# Patient Record
Sex: MALE | Race: WHITE | NOT HISPANIC OR LATINO | Employment: OTHER | URBAN - METROPOLITAN AREA
[De-identification: names, ages, dates, MRNs, and addresses within clinical notes are randomized per-mention and may not be internally consistent; named-entity substitution may affect disease eponyms.]

---

## 2017-01-04 ENCOUNTER — TRANSCRIBE ORDERS (OUTPATIENT)
Dept: LAB | Facility: CLINIC | Age: 67
End: 2017-01-04

## 2017-01-04 ENCOUNTER — APPOINTMENT (OUTPATIENT)
Dept: LAB | Facility: CLINIC | Age: 67
End: 2017-01-04
Payer: MEDICARE

## 2017-01-04 DIAGNOSIS — E10.10 TYPE 1 DIABETES MELLITUS WITH KETOACIDOSIS WITHOUT COMA (HCC): ICD-10-CM

## 2017-01-04 DIAGNOSIS — I10 ESSENTIAL HYPERTENSION, MALIGNANT: Primary | ICD-10-CM

## 2017-01-04 DIAGNOSIS — I15.9 OTHER SECONDARY HYPERTENSION, MALIGNANT: ICD-10-CM

## 2017-01-04 LAB
ALBUMIN SERPL BCP-MCNC: 3.6 G/DL (ref 3.5–5)
ANION GAP SERPL CALCULATED.3IONS-SCNC: 6 MMOL/L (ref 4–13)
BUN SERPL-MCNC: 15 MG/DL (ref 5–25)
CALCIUM ALBUM COR SERPL-MCNC: 10.7 MG/DL (ref 8.3–10.1)
CALCIUM SERPL-MCNC: 10.4 MD/DL (ref 8.3–10.1)
CALCIUM SERPL-MCNC: 10.4 MG/DL (ref 8.3–10.1)
CHLORIDE SERPL-SCNC: 103 MMOL/L (ref 100–108)
CHOLEST SERPL-MCNC: 149 MG/DL (ref 50–200)
CO2 SERPL-SCNC: 30 MMOL/L (ref 21–32)
CREAT SERPL-MCNC: 0.82 MG/DL (ref 0.6–1.3)
EST. AVERAGE GLUCOSE BLD GHB EST-MCNC: 140 MG/DL
GFR SERPL CREATININE-BSD FRML MDRD: >60 ML/MIN/1.73SQ M
GLUCOSE SERPL-MCNC: 137 MG/DL (ref 65–140)
HBA1C MFR BLD: 6.5 % (ref 4.2–6.3)
HDLC SERPL-MCNC: 41 MG/DL (ref 40–60)
LDLC SERPL CALC-MCNC: 53 MG/DL (ref 0–100)
POTASSIUM SERPL-SCNC: 4 MMOL/L (ref 3.5–5.3)
SODIUM SERPL-SCNC: 139 MMOL/L (ref 136–145)
TRIGL SERPL-MCNC: 274 MG/DL

## 2017-01-04 PROCEDURE — 80048 BASIC METABOLIC PNL TOTAL CA: CPT

## 2017-01-04 PROCEDURE — 80061 LIPID PANEL: CPT

## 2017-01-04 PROCEDURE — 82310 ASSAY OF CALCIUM: CPT

## 2017-01-04 PROCEDURE — 83036 HEMOGLOBIN GLYCOSYLATED A1C: CPT

## 2017-01-04 PROCEDURE — 82040 ASSAY OF SERUM ALBUMIN: CPT

## 2017-01-04 PROCEDURE — 36415 COLL VENOUS BLD VENIPUNCTURE: CPT

## 2017-04-11 ENCOUNTER — TRANSCRIBE ORDERS (OUTPATIENT)
Dept: LAB | Facility: CLINIC | Age: 67
End: 2017-04-11

## 2017-04-11 ENCOUNTER — APPOINTMENT (OUTPATIENT)
Dept: LAB | Facility: CLINIC | Age: 67
End: 2017-04-11
Payer: MEDICARE

## 2017-04-11 DIAGNOSIS — I10 ESSENTIAL HYPERTENSION, BENIGN: Primary | ICD-10-CM

## 2017-04-11 DIAGNOSIS — I10 ESSENTIAL HYPERTENSION, BENIGN: ICD-10-CM

## 2017-04-11 DIAGNOSIS — E11.8 TYPE 2 DIABETES MELLITUS WITH COMPLICATION, WITHOUT LONG-TERM CURRENT USE OF INSULIN (HCC): ICD-10-CM

## 2017-04-11 LAB
ALBUMIN SERPL BCP-MCNC: 3.8 G/DL (ref 3.5–5)
ANION GAP SERPL CALCULATED.3IONS-SCNC: 10 MMOL/L (ref 4–13)
BUN SERPL-MCNC: 12 MG/DL (ref 5–25)
CALCIUM ALBUM COR SERPL-MCNC: 10.4 MG/DL (ref 8.3–10.1)
CALCIUM SERPL-MCNC: 10.2 MD/DL (ref 8.3–10.1)
CALCIUM SERPL-MCNC: 10.2 MG/DL (ref 8.3–10.1)
CHLORIDE SERPL-SCNC: 103 MMOL/L (ref 100–108)
CHOLEST SERPL-MCNC: 159 MG/DL (ref 50–200)
CO2 SERPL-SCNC: 27 MMOL/L (ref 21–32)
CREAT SERPL-MCNC: 0.86 MG/DL (ref 0.6–1.3)
EST. AVERAGE GLUCOSE BLD GHB EST-MCNC: 151 MG/DL
GFR SERPL CREATININE-BSD FRML MDRD: >60 ML/MIN/1.73SQ M
GLUCOSE P FAST SERPL-MCNC: 148 MG/DL (ref 65–99)
HBA1C MFR BLD: 6.9 % (ref 4.2–6.3)
HDLC SERPL-MCNC: 44 MG/DL (ref 40–60)
LDLC SERPL CALC-MCNC: 60 MG/DL (ref 0–100)
POTASSIUM SERPL-SCNC: 3.9 MMOL/L (ref 3.5–5.3)
SODIUM SERPL-SCNC: 140 MMOL/L (ref 136–145)
TRIGL SERPL-MCNC: 277 MG/DL

## 2017-04-11 PROCEDURE — 82040 ASSAY OF SERUM ALBUMIN: CPT

## 2017-04-11 PROCEDURE — 80048 BASIC METABOLIC PNL TOTAL CA: CPT

## 2017-04-11 PROCEDURE — 82310 ASSAY OF CALCIUM: CPT

## 2017-04-11 PROCEDURE — 80061 LIPID PANEL: CPT

## 2017-04-11 PROCEDURE — 83036 HEMOGLOBIN GLYCOSYLATED A1C: CPT

## 2017-04-11 PROCEDURE — 36415 COLL VENOUS BLD VENIPUNCTURE: CPT

## 2017-07-31 ENCOUNTER — APPOINTMENT (OUTPATIENT)
Dept: LAB | Facility: CLINIC | Age: 67
End: 2017-07-31
Payer: MEDICARE

## 2017-07-31 ENCOUNTER — TRANSCRIBE ORDERS (OUTPATIENT)
Dept: LAB | Facility: CLINIC | Age: 67
End: 2017-07-31

## 2017-07-31 DIAGNOSIS — E11.9 DIABETES MELLITUS WITHOUT COMPLICATION (HCC): ICD-10-CM

## 2017-07-31 DIAGNOSIS — E78.5 HYPERLIPIDEMIA, UNSPECIFIED HYPERLIPIDEMIA TYPE: Primary | ICD-10-CM

## 2017-07-31 DIAGNOSIS — E78.5 HYPERLIPIDEMIA, UNSPECIFIED HYPERLIPIDEMIA TYPE: ICD-10-CM

## 2017-07-31 LAB
ALBUMIN SERPL BCP-MCNC: 3.9 G/DL (ref 3.5–5)
ANION GAP SERPL CALCULATED.3IONS-SCNC: 9 MMOL/L (ref 4–13)
BUN SERPL-MCNC: 13 MG/DL (ref 5–25)
CALCIUM ALBUM COR SERPL-MCNC: 10.8 MG/DL (ref 8.3–10.1)
CALCIUM SERPL-MCNC: 10.7 MD/DL (ref 8.3–10.1)
CALCIUM SERPL-MCNC: 10.7 MG/DL (ref 8.3–10.1)
CHLORIDE SERPL-SCNC: 103 MMOL/L (ref 100–108)
CO2 SERPL-SCNC: 27 MMOL/L (ref 21–32)
CREAT SERPL-MCNC: 0.8 MG/DL (ref 0.6–1.3)
EST. AVERAGE GLUCOSE BLD GHB EST-MCNC: 143 MG/DL
GFR SERPL CREATININE-BSD FRML MDRD: 92 ML/MIN/1.73SQ M
GLUCOSE P FAST SERPL-MCNC: 130 MG/DL (ref 65–99)
HBA1C MFR BLD: 6.6 % (ref 4.2–6.3)
POTASSIUM SERPL-SCNC: 3.9 MMOL/L (ref 3.5–5.3)
SODIUM SERPL-SCNC: 139 MMOL/L (ref 136–145)

## 2017-07-31 PROCEDURE — 36415 COLL VENOUS BLD VENIPUNCTURE: CPT

## 2017-07-31 PROCEDURE — 80048 BASIC METABOLIC PNL TOTAL CA: CPT

## 2017-07-31 PROCEDURE — 82040 ASSAY OF SERUM ALBUMIN: CPT

## 2017-07-31 PROCEDURE — 82310 ASSAY OF CALCIUM: CPT

## 2017-07-31 PROCEDURE — 83036 HEMOGLOBIN GLYCOSYLATED A1C: CPT

## 2017-11-28 ENCOUNTER — TRANSCRIBE ORDERS (OUTPATIENT)
Dept: LAB | Facility: CLINIC | Age: 67
End: 2017-11-28

## 2017-11-28 ENCOUNTER — APPOINTMENT (OUTPATIENT)
Dept: LAB | Facility: CLINIC | Age: 67
End: 2017-11-28
Payer: MEDICARE

## 2017-11-28 DIAGNOSIS — E11.9 DIABETES MELLITUS WITHOUT COMPLICATION (HCC): ICD-10-CM

## 2017-11-28 DIAGNOSIS — I10 HYPERTENSION, UNSPECIFIED TYPE: ICD-10-CM

## 2017-11-28 DIAGNOSIS — E78.00 HYPERCHOLESTEREMIA: ICD-10-CM

## 2017-11-28 DIAGNOSIS — E11.9 DIABETES MELLITUS WITHOUT COMPLICATION (HCC): Primary | ICD-10-CM

## 2017-11-28 LAB
ALBUMIN SERPL BCP-MCNC: 3.9 G/DL (ref 3.5–5)
ANION GAP SERPL CALCULATED.3IONS-SCNC: 6 MMOL/L (ref 4–13)
BUN SERPL-MCNC: 14 MG/DL (ref 5–25)
CALCIUM ALBUM COR SERPL-MCNC: 10.6 MG/DL (ref 8.3–10.1)
CALCIUM SERPL-MCNC: 10.5 MG/DL (ref 8.3–10.1)
CALCIUM SERPL-MCNC: 10.5 MG/DL (ref 8.3–10.1)
CHLORIDE SERPL-SCNC: 105 MMOL/L (ref 100–108)
CHOLEST SERPL-MCNC: 146 MG/DL (ref 50–200)
CO2 SERPL-SCNC: 29 MMOL/L (ref 21–32)
CREAT SERPL-MCNC: 0.85 MG/DL (ref 0.6–1.3)
EST. AVERAGE GLUCOSE BLD GHB EST-MCNC: 140 MG/DL
GFR SERPL CREATININE-BSD FRML MDRD: 90 ML/MIN/1.73SQ M
GLUCOSE P FAST SERPL-MCNC: 140 MG/DL (ref 65–99)
HBA1C MFR BLD: 6.5 % (ref 4.2–6.3)
HDLC SERPL-MCNC: 41 MG/DL (ref 40–60)
LDLC SERPL CALC-MCNC: 58 MG/DL (ref 0–100)
POTASSIUM SERPL-SCNC: 3.9 MMOL/L (ref 3.5–5.3)
SODIUM SERPL-SCNC: 140 MMOL/L (ref 136–145)
TRIGL SERPL-MCNC: 234 MG/DL

## 2017-11-28 PROCEDURE — 80048 BASIC METABOLIC PNL TOTAL CA: CPT

## 2017-11-28 PROCEDURE — 80061 LIPID PANEL: CPT

## 2017-11-28 PROCEDURE — 82040 ASSAY OF SERUM ALBUMIN: CPT

## 2017-11-28 PROCEDURE — 83036 HEMOGLOBIN GLYCOSYLATED A1C: CPT

## 2017-11-28 PROCEDURE — 82310 ASSAY OF CALCIUM: CPT

## 2017-11-28 PROCEDURE — 36415 COLL VENOUS BLD VENIPUNCTURE: CPT

## 2018-02-28 ENCOUNTER — TRANSCRIBE ORDERS (OUTPATIENT)
Dept: LAB | Facility: CLINIC | Age: 68
End: 2018-02-28

## 2018-02-28 ENCOUNTER — APPOINTMENT (OUTPATIENT)
Dept: LAB | Facility: CLINIC | Age: 68
End: 2018-02-28
Payer: MEDICARE

## 2018-02-28 DIAGNOSIS — E11.9 DIABETES MELLITUS WITHOUT COMPLICATION (HCC): ICD-10-CM

## 2018-02-28 DIAGNOSIS — E11.9 DIABETES MELLITUS WITHOUT COMPLICATION (HCC): Primary | ICD-10-CM

## 2018-02-28 DIAGNOSIS — E78.00 HYPERCHOLESTEROLEMIA: ICD-10-CM

## 2018-02-28 DIAGNOSIS — I10 HYPERTENSION, UNSPECIFIED TYPE: ICD-10-CM

## 2018-02-28 LAB
ALBUMIN SERPL BCP-MCNC: 3.9 G/DL (ref 3.5–5)
ANION GAP SERPL CALCULATED.3IONS-SCNC: 8 MMOL/L (ref 4–13)
BUN SERPL-MCNC: 13 MG/DL (ref 5–25)
CALCIUM ALBUM COR SERPL-MCNC: 11 MG/DL (ref 8.3–10.1)
CALCIUM SERPL-MCNC: 10.9 MG/DL (ref 8.3–10.1)
CALCIUM SERPL-MCNC: 10.9 MG/DL (ref 8.3–10.1)
CHLORIDE SERPL-SCNC: 104 MMOL/L (ref 100–108)
CHOLEST SERPL-MCNC: 153 MG/DL (ref 50–200)
CO2 SERPL-SCNC: 29 MMOL/L (ref 21–32)
CREAT SERPL-MCNC: 0.8 MG/DL (ref 0.6–1.3)
EST. AVERAGE GLUCOSE BLD GHB EST-MCNC: 148 MG/DL
GFR SERPL CREATININE-BSD FRML MDRD: 92 ML/MIN/1.73SQ M
GLUCOSE P FAST SERPL-MCNC: 129 MG/DL (ref 65–99)
HBA1C MFR BLD: 6.8 % (ref 4.2–6.3)
HDLC SERPL-MCNC: 46 MG/DL (ref 40–60)
LDLC SERPL CALC-MCNC: 61 MG/DL (ref 0–100)
POTASSIUM SERPL-SCNC: 3.9 MMOL/L (ref 3.5–5.3)
SODIUM SERPL-SCNC: 141 MMOL/L (ref 136–145)
TRIGL SERPL-MCNC: 228 MG/DL

## 2018-02-28 PROCEDURE — 82310 ASSAY OF CALCIUM: CPT

## 2018-02-28 PROCEDURE — 80061 LIPID PANEL: CPT

## 2018-02-28 PROCEDURE — 80048 BASIC METABOLIC PNL TOTAL CA: CPT

## 2018-02-28 PROCEDURE — 83036 HEMOGLOBIN GLYCOSYLATED A1C: CPT

## 2018-02-28 PROCEDURE — 82040 ASSAY OF SERUM ALBUMIN: CPT

## 2018-02-28 PROCEDURE — 36415 COLL VENOUS BLD VENIPUNCTURE: CPT

## 2018-05-29 ENCOUNTER — TRANSCRIBE ORDERS (OUTPATIENT)
Dept: LAB | Facility: CLINIC | Age: 68
End: 2018-05-29

## 2018-05-29 ENCOUNTER — APPOINTMENT (OUTPATIENT)
Dept: LAB | Facility: CLINIC | Age: 68
End: 2018-05-29
Payer: MEDICARE

## 2018-05-29 DIAGNOSIS — E11.9 DIABETES MELLITUS WITHOUT COMPLICATION (HCC): Primary | ICD-10-CM

## 2018-05-29 DIAGNOSIS — E11.9 DIABETES MELLITUS WITHOUT COMPLICATION (HCC): ICD-10-CM

## 2018-05-29 LAB
ALBUMIN SERPL BCP-MCNC: 4.1 G/DL (ref 3.5–5)
ANION GAP SERPL CALCULATED.3IONS-SCNC: 6 MMOL/L (ref 4–13)
BUN SERPL-MCNC: 14 MG/DL (ref 5–25)
CALCIUM ALBUM COR SERPL-MCNC: 10.5 MG/DL (ref 8.3–10.1)
CALCIUM SERPL-MCNC: 10.6 MG/DL (ref 8.3–10.1)
CALCIUM SERPL-MCNC: 10.6 MG/DL (ref 8.3–10.1)
CHLORIDE SERPL-SCNC: 103 MMOL/L (ref 100–108)
CO2 SERPL-SCNC: 29 MMOL/L (ref 21–32)
CREAT SERPL-MCNC: 0.83 MG/DL (ref 0.6–1.3)
EST. AVERAGE GLUCOSE BLD GHB EST-MCNC: 137 MG/DL
GFR SERPL CREATININE-BSD FRML MDRD: 90 ML/MIN/1.73SQ M
GLUCOSE P FAST SERPL-MCNC: 133 MG/DL (ref 65–99)
HBA1C MFR BLD: 6.4 % (ref 4.2–6.3)
POTASSIUM SERPL-SCNC: 4.1 MMOL/L (ref 3.5–5.3)
SODIUM SERPL-SCNC: 138 MMOL/L (ref 136–145)

## 2018-05-29 PROCEDURE — 36415 COLL VENOUS BLD VENIPUNCTURE: CPT

## 2018-05-29 PROCEDURE — 80048 BASIC METABOLIC PNL TOTAL CA: CPT

## 2018-05-29 PROCEDURE — 83036 HEMOGLOBIN GLYCOSYLATED A1C: CPT

## 2018-05-29 PROCEDURE — 82040 ASSAY OF SERUM ALBUMIN: CPT

## 2018-07-23 DIAGNOSIS — I10 ESSENTIAL HYPERTENSION: Primary | ICD-10-CM

## 2018-07-23 RX ORDER — ATENOLOL 50 MG/1
50 TABLET ORAL DAILY
Qty: 90 TABLET | Refills: 3 | Status: SHIPPED | OUTPATIENT
Start: 2018-07-23 | End: 2019-07-18 | Stop reason: SDUPTHER

## 2018-07-23 RX ORDER — ATENOLOL 50 MG/1
TABLET ORAL
Refills: 0 | COMMUNITY
Start: 2018-04-23 | End: 2018-07-23 | Stop reason: SDUPTHER

## 2018-07-26 DIAGNOSIS — Z79.4 TYPE 2 DIABETES MELLITUS WITHOUT COMPLICATION, WITH LONG-TERM CURRENT USE OF INSULIN (HCC): Primary | ICD-10-CM

## 2018-07-26 DIAGNOSIS — E11.9 TYPE 2 DIABETES MELLITUS WITHOUT COMPLICATION, WITH LONG-TERM CURRENT USE OF INSULIN (HCC): Primary | ICD-10-CM

## 2018-07-31 DIAGNOSIS — E78.5 HYPERLIPIDEMIA, UNSPECIFIED HYPERLIPIDEMIA TYPE: ICD-10-CM

## 2018-07-31 DIAGNOSIS — I10 HYPERTENSION, UNSPECIFIED TYPE: Primary | ICD-10-CM

## 2018-07-31 RX ORDER — HYDROCHLOROTHIAZIDE 25 MG/1
25 TABLET ORAL DAILY
Qty: 30 TABLET | Refills: 3 | Status: SHIPPED | OUTPATIENT
Start: 2018-07-31 | End: 2018-11-29 | Stop reason: SDUPTHER

## 2018-07-31 RX ORDER — SIMVASTATIN 20 MG
20 TABLET ORAL
Qty: 30 TABLET | Refills: 3 | Status: SHIPPED | OUTPATIENT
Start: 2018-07-31 | End: 2018-11-29 | Stop reason: SDUPTHER

## 2018-08-07 DIAGNOSIS — I10 HYPERTENSION, UNSPECIFIED TYPE: Primary | ICD-10-CM

## 2018-08-07 RX ORDER — BENAZEPRIL HYDROCHLORIDE 20 MG/1
20 TABLET ORAL DAILY
Qty: 30 TABLET | Refills: 3 | Status: SHIPPED | OUTPATIENT
Start: 2018-08-07 | End: 2018-12-07 | Stop reason: SDUPTHER

## 2018-08-07 RX ORDER — AMLODIPINE BESYLATE 10 MG/1
10 TABLET ORAL DAILY
Qty: 30 TABLET | Refills: 3 | Status: SHIPPED | OUTPATIENT
Start: 2018-08-07 | End: 2018-12-07 | Stop reason: SDUPTHER

## 2018-08-09 PROBLEM — E78.5 HYPERLIPIDEMIA: Status: ACTIVE | Noted: 2018-08-09

## 2018-08-09 PROBLEM — I10 HYPERTENSION: Status: ACTIVE | Noted: 2018-08-09

## 2018-08-09 PROBLEM — D49.2 NEOPLASM OF SKIN: Status: ACTIVE | Noted: 2018-08-09

## 2018-08-09 PROBLEM — I25.10 CARDIOVASCULAR DISEASE: Status: ACTIVE | Noted: 2018-08-09

## 2018-08-09 PROBLEM — E11.9 DIABETES MELLITUS (HCC): Status: ACTIVE | Noted: 2018-08-09

## 2018-08-09 PROBLEM — E66.9 OBESITY: Status: ACTIVE | Noted: 2018-08-09

## 2018-08-29 DIAGNOSIS — E11.8 TYPE 2 DIABETES MELLITUS WITH COMPLICATION, WITHOUT LONG-TERM CURRENT USE OF INSULIN (HCC): Primary | ICD-10-CM

## 2018-09-05 ENCOUNTER — OFFICE VISIT (OUTPATIENT)
Dept: FAMILY MEDICINE CLINIC | Facility: CLINIC | Age: 68
End: 2018-09-05
Payer: MEDICARE

## 2018-09-05 VITALS
SYSTOLIC BLOOD PRESSURE: 142 MMHG | TEMPERATURE: 98.1 F | HEART RATE: 88 BPM | HEIGHT: 67 IN | DIASTOLIC BLOOD PRESSURE: 82 MMHG | WEIGHT: 189.8 LBS | OXYGEN SATURATION: 98 % | RESPIRATION RATE: 18 BRPM | BODY MASS INDEX: 29.79 KG/M2

## 2018-09-05 DIAGNOSIS — J06.9 UPPER RESPIRATORY TRACT INFECTION, UNSPECIFIED TYPE: ICD-10-CM

## 2018-09-05 DIAGNOSIS — E11.8 TYPE 2 DIABETES MELLITUS WITH COMPLICATION, WITHOUT LONG-TERM CURRENT USE OF INSULIN (HCC): Primary | ICD-10-CM

## 2018-09-05 DIAGNOSIS — I10 ESSENTIAL HYPERTENSION: ICD-10-CM

## 2018-09-05 DIAGNOSIS — E78.5 HYPERLIPIDEMIA, UNSPECIFIED HYPERLIPIDEMIA TYPE: ICD-10-CM

## 2018-09-05 PROCEDURE — 99214 OFFICE O/P EST MOD 30 MIN: CPT | Performed by: INTERNAL MEDICINE

## 2018-09-05 RX ORDER — BRIMONIDINE TARTRATE/TIMOLOL 0.2%-0.5%
1 DROPS OPHTHALMIC (EYE) EVERY 12 HOURS SCHEDULED
COMMUNITY
Start: 2018-08-27 | End: 2021-01-01 | Stop reason: HOSPADM

## 2018-09-05 RX ORDER — AZITHROMYCIN 250 MG/1
250 TABLET, FILM COATED ORAL EVERY 24 HOURS
Qty: 6 TABLET | Refills: 0 | Status: SHIPPED | OUTPATIENT
Start: 2018-09-05 | End: 2018-09-10

## 2018-09-05 RX ORDER — NETARSUDIL 0.2 MG/ML
SOLUTION/ DROPS OPHTHALMIC; TOPICAL
COMMUNITY
Start: 2018-07-23 | End: 2021-01-01 | Stop reason: HOSPADM

## 2018-09-05 NOTE — PROGRESS NOTES
Assessment/Plan:  Acute bronchitis  Z-Izaiah 250 mg 2 tablets initially then 1 tablet daily for 4 days  Robitussin DM 10 cc 3 times a day if no improvement in 1 week then CBC chest x-ray will be done  Diabetes mellitus type 2  Well controlled with metformin 1 g twice a day and Januvia 50 mg daily  Hypertension well controlled with Norvasc 10 mg daily Lotensin 20 mg daily hydrochlorothiazide 25 mg daily  Hyperlipidemia continue Zocor 20 mg daily  History of glaucoma is on multiple medication  The lab data done on from May 29, 2018 shows A1c of 6 4 electrolytes are normal fasting sugar is 133 calcium was borderline elevated this can be addressed by doctor Glenn Saucedo his regular physician these lab data were reviewed and discussed with the patient recheck up in about 7-10 days with doctor Glenn Saucedo    Diagnoses and all orders for this visit:    Type 2 diabetes mellitus with complication, without long-term current use of insulin ([address] )    Essential hypertension    Hyperlipidemia, unspecified hyperlipidemia type    Upper respiratory tract infection, unspecified type  -     azithromycin (ZITHROMAX) 250 mg tablet; Take 1 tablet (250 mg total) by mouth every 24 hours for 5 days Two tablets initially then 1 tablet daily for 4 days    Other orders  -     COMBIGAN 0 2-0 5 %;   -     RHOPRESSA 0 02 % SOLN;   -     travoprost (TRAVATAN Z) 0 004 % ophthalmic solution; Administer 1 drop to both eyes daily at bedtime        Subjective:      Patient ID: Moustapha Arana is a 76 y o  male      43-year-old white male is coming in for a routine follow-up visit  Reports cough of greenish sputum for the past 3 days no fever no chest pain no shortness of breathing no rectal bleeding or melena is reported  No abdominal pain is reported        The following portions of the patient's history were reviewed and updated as appropriate: allergies, current medications, past family history, past medical history, past social history, past surgical history and problem list       Current Outpatient Prescriptions:     amLODIPine (NORVASC) 10 mg tablet, Take 1 tablet (10 mg total) by mouth daily, Disp: 30 tablet, Rfl: 3    aspirin 81 MG tablet, Take 81 mg by mouth daily, Disp: , Rfl:     atenolol (TENORMIN) 50 mg tablet, Take 1 tablet (50 mg total) by mouth daily, Disp: 90 tablet, Rfl: 3    benazepril (LOTENSIN) 20 mg tablet, Take 1 tablet (20 mg total) by mouth daily, Disp: 30 tablet, Rfl: 3    COMBIGAN 0 2-0 5 %, , Disp: , Rfl:     hydrochlorothiazide (HYDRODIURIL) 25 mg tablet, Take 1 tablet (25 mg total) by mouth daily, Disp: 30 tablet, Rfl: 3    metFORMIN (GLUCOPHAGE) 1000 MG tablet, Take 1 tablet (1,000 mg total) by mouth 2 (two) times a day with meals, Disp: 60 tablet, Rfl: 3    RHOPRESSA 0 02 % SOLN, , Disp: , Rfl:     simvastatin (ZOCOR) 20 mg tablet, Take 1 tablet (20 mg total) by mouth daily at bedtime, Disp: 30 tablet, Rfl: 3    sitaGLIPtin (JANUVIA) 50 mg tablet, Take 1 tablet (50 mg total) by mouth daily, Disp: 90 tablet, Rfl: 1    travoprost (TRAVATAN Z) 0 004 % ophthalmic solution, Administer 1 drop to both eyes daily at bedtime, Disp: , Rfl:     azithromycin (ZITHROMAX) 250 mg tablet, Take 1 tablet (250 mg total) by mouth every 24 hours for 5 days Two tablets initially then 1 tablet daily for 4 days, Disp: 6 tablet, Rfl: 0    Past Medical History:   Diagnosis Date    Cerebral vascular disease     Diabetes mellitus ([facility] )     Hyperlipidemia     Hypertension        Past Surgical History:   Procedure Laterality Date    CATARACT EXTRACTION      GANGLION CYST EXCISION      SKIN LESION EXCISION         Social History       Patient Active Problem List   Diagnosis    Hypertension    Hyperlipidemia    Diabetes mellitus ([facility] )    Cardiovascular disease    Neoplasm of skin    Obesity           Review of Systems   Constitutional: Negative  HENT: Negative  Eyes: Negative  Respiratory: Negative  Cardiovascular: Negative  Gastrointestinal: Negative  Endocrine: Negative  Genitourinary: Negative  Musculoskeletal: Negative  Skin: Negative  Allergic/Immunologic: Negative  Neurological: Negative  Hematological: Negative  Psychiatric/Behavioral: Negative  Objective:      /82   Pulse 88   Temp 98 1 °F (36 7 °C) (Tympanic)   Resp 18   Ht 5' 7" (1 702 m)   Wt 86 1 kg (189 lb 12 8 oz)   SpO2 98%   BMI 29 73 kg/m²          Physical Exam   Constitutional: He is oriented to person, place, and time  He appears well-developed and well-nourished  HENT:   Head: Normocephalic  Mouth/Throat: Oropharynx is clear and moist    Eyes: Conjunctivae are normal  Pupils are equal, round, and reactive to light  Neck: Normal range of motion  Neck supple  Cardiovascular: Normal rate and normal heart sounds  Pulmonary/Chest: Effort normal and breath sounds normal    Abdominal: Soft  Bowel sounds are normal    Musculoskeletal: Normal range of motion  Neurological: He is alert and oriented to person, place, and time  Skin: Skin is warm and dry  Appointment on 05/29/2018   Component Date Value Ref Range Status    Sodium 05/29/2018 138  136 - 145 mmol/L Final    Potassium 05/29/2018 4 1  3 5 - 5 3 mmol/L Final    Chloride 05/29/2018 103  100 - 108 mmol/L Final    CO2 05/29/2018 29  21 - 32 mmol/L Final    ANION GAP 05/29/2018 6  4 - 13 mmol/L Final    BUN 05/29/2018 14  5 - 25 mg/dL Final    Creatinine 05/29/2018 0 83  0 60 - 1 30 mg/dL Final    Standardized to IDMS reference method    Glucose, Fasting 05/29/2018 133* 65 - 99 mg/dL Final      Specimen collection should occur prior to Sulfasalazine administration due to the potential for falsely depressed results  Specimen collection should occur prior to Sulfapyridine administration due to the potential for falsely elevated results      Calcium 05/29/2018 10 6* 8 3 - 10 1 mg/dL Final    eGFR 05/29/2018 90  ml/min/1 73sq m Final    Hemoglobin A1C 05/29/2018 6 4* 4 2 - 6 3 % Final    EAG 05/29/2018 137  mg/dl Final    Albumin 05/29/2018 4 1  3 5 - 5 0 g/dL Final    Corrected Calcium 05/29/2018 10 5* 8 3 - 10 1 mg/dL Final    CALCIUM FOR CA/ALB 05/29/2018 10 6* 8 3 - 10 1 mg/dL Final       No results found

## 2018-10-09 ENCOUNTER — OFFICE VISIT (OUTPATIENT)
Dept: FAMILY MEDICINE CLINIC | Facility: CLINIC | Age: 68
End: 2018-10-09
Payer: MEDICARE

## 2018-10-09 VITALS
DIASTOLIC BLOOD PRESSURE: 76 MMHG | RESPIRATION RATE: 16 BRPM | HEIGHT: 67 IN | SYSTOLIC BLOOD PRESSURE: 130 MMHG | HEART RATE: 82 BPM | OXYGEN SATURATION: 99 % | WEIGHT: 191 LBS | BODY MASS INDEX: 29.98 KG/M2 | TEMPERATURE: 97.4 F

## 2018-10-09 DIAGNOSIS — Z23 NEEDS FLU SHOT: Primary | ICD-10-CM

## 2018-10-09 DIAGNOSIS — E11.8 TYPE 2 DIABETES MELLITUS WITH COMPLICATION, WITHOUT LONG-TERM CURRENT USE OF INSULIN (HCC): ICD-10-CM

## 2018-10-09 DIAGNOSIS — E78.5 HYPERLIPIDEMIA, UNSPECIFIED HYPERLIPIDEMIA TYPE: ICD-10-CM

## 2018-10-09 DIAGNOSIS — Z00.00 PREVENTATIVE HEALTH CARE: ICD-10-CM

## 2018-10-09 DIAGNOSIS — J20.9 ACUTE BRONCHITIS, UNSPECIFIED ORGANISM: ICD-10-CM

## 2018-10-09 DIAGNOSIS — I10 ESSENTIAL HYPERTENSION: ICD-10-CM

## 2018-10-09 PROCEDURE — 90732 PPSV23 VACC 2 YRS+ SUBQ/IM: CPT

## 2018-10-09 PROCEDURE — 90662 IIV NO PRSV INCREASED AG IM: CPT

## 2018-10-09 PROCEDURE — G0008 ADMIN INFLUENZA VIRUS VAC: HCPCS

## 2018-10-09 PROCEDURE — G0009 ADMIN PNEUMOCOCCAL VACCINE: HCPCS

## 2018-10-09 PROCEDURE — 99214 OFFICE O/P EST MOD 30 MIN: CPT | Performed by: INTERNAL MEDICINE

## 2018-10-09 NOTE — PROGRESS NOTES
Assessment/Plan:  1  Acute bronchitis resolved  2   Type 2 diabetes with satisfactory control  A1c 6 4  Fasting sugar 120-130   3  Hypertension treated  4   Hyperlipidemia on statin therapy  Plan  Meds reviewed no changes made  Check Chem 7 lipid profile and A1c for next visit  Patient given flu shot and booster for Pneumovax 23  Patient has previously received Prevnar 13 last seen December  Recheck and follow-up 3 months         Diagnoses and all orders for this visit:    Needs flu shot  -     influenza vaccine, [id number], high-dose, PF 0 5 mL, for patients 65 yr+ (FLUZONE HIGH-DOSE)    Type 2 diabetes mellitus with complication, without long-term current use of insulin (HCC)  -     Hemoglobin A1C; Future    Essential hypertension  -     Basic metabolic panel; Future    Hyperlipidemia, unspecified hyperlipidemia type  -     Lipid panel; Future    Acute bronchitis, unspecified organism  -     CBC and differential; Future    Preventative health care  -     PNEUMOCOCCAL POLYSACCHARIDE VACCINE 23-VALENT =>1YO SQ IM          Subjective:      Patient ID: Lizbeth Mujica is a 76 y o  male  22-year-old male comes for a follow-up visit  Patient was recently seen and treated for an episode of acute bronchitis which has resolved now  Patient has medical history of hypertension treated on current medication, type 2 diabetes last A1c 6 4  History of hyperlipidemia on statin therapy  Patient denies any chest pain dyspnea or palpitation  No nausea no vomiting  The following portions of the patient's history were reviewed and updated as appropriate: He  has a past medical history of Cerebral vascular disease; Diabetes mellitus ([address] ); Hyperlipidemia; and Hypertension    Patient Active Problem List    Diagnosis Date Noted    Hypertension 08/09/2018    Hyperlipidemia 08/09/2018    Diabetes mellitus ([address] ) 08/09/2018    Cardiovascular disease 08/09/2018    Neoplasm of skin 08/09/2018    Obesity 08/09/2018     He  has a past surgical history that includes Skin lesion excision; Cataract extraction; and Ganglion cyst excision  His family history includes Asthma in his mother; Diabetes in his father; Emphysema in his mother; Heart disease in his mother; Hypertension in his mother  He  reports that he has quit smoking  His smoking use included Cigars  He has never used smokeless tobacco  He reports that he drinks about 0 6 oz of alcohol per week   He reports that he does not use drugs  Current Outpatient Prescriptions   Medication Sig Dispense Refill    amLODIPine (NORVASC) 10 mg tablet Take 1 tablet (10 mg total) by mouth daily 30 tablet 3    aspirin 81 MG tablet Take 81 mg by mouth daily      atenolol (TENORMIN) 50 mg tablet Take 1 tablet (50 mg total) by mouth daily 90 tablet 3    benazepril (LOTENSIN) 20 mg tablet Take 1 tablet (20 mg total) by mouth daily 30 tablet 3    COMBIGAN 0 2-0 5 %       hydrochlorothiazide (HYDRODIURIL) 25 mg tablet Take 1 tablet (25 mg total) by mouth daily 30 tablet 3    metFORMIN (GLUCOPHAGE) 1000 MG tablet Take 1 tablet (1,000 mg total) by mouth 2 (two) times a day with meals 60 tablet 3    RHOPRESSA 0 02 % SOLN       simvastatin (ZOCOR) 20 mg tablet Take 1 tablet (20 mg total) by mouth daily at bedtime 30 tablet 3    sitaGLIPtin (JANUVIA) 50 mg tablet Take 1 tablet (50 mg total) by mouth daily 90 tablet 1    travoprost (TRAVATAN Z) 0 004 % ophthalmic solution Administer 1 drop to both eyes daily at bedtime       No current facility-administered medications for this visit        Current Outpatient Prescriptions on File Prior to Visit   Medication Sig    amLODIPine (NORVASC) 10 mg tablet Take 1 tablet (10 mg total) by mouth daily    aspirin 81 MG tablet Take 81 mg by mouth daily    atenolol (TENORMIN) 50 mg tablet Take 1 tablet (50 mg total) by mouth daily    benazepril (LOTENSIN) 20 mg tablet Take 1 tablet (20 mg total) by mouth daily    COMBIGAN 0 2-0 5 %  hydrochlorothiazide (HYDRODIURIL) 25 mg tablet Take 1 tablet (25 mg total) by mouth daily    metFORMIN (GLUCOPHAGE) 1000 MG tablet Take 1 tablet (1,000 mg total) by mouth 2 (two) times a day with meals    RHOPRESSA 0 02 % SOLN     simvastatin (ZOCOR) 20 mg tablet Take 1 tablet (20 mg total) by mouth daily at bedtime    sitaGLIPtin (JANUVIA) 50 mg tablet Take 1 tablet (50 mg total) by mouth daily    travoprost (TRAVATAN Z) 0 004 % ophthalmic solution Administer 1 drop to both eyes daily at bedtime     No current facility-administered medications on file prior to visit  He is allergic to allopurinol and sulfa antibiotics       Review of Systems   Constitutional: Negative  HENT: Negative  Eyes: Negative  Respiratory: Negative  Cardiovascular: Negative  Gastrointestinal: Negative  Endocrine: Negative  Genitourinary: Negative  Musculoskeletal: Negative  Skin: Negative  Allergic/Immunologic: Negative  Neurological: Negative  Hematological: Negative  Psychiatric/Behavioral: Negative  Objective:      /76 (BP Location: Right arm, Patient Position: Supine, Cuff Size: Adult)   Pulse 82   Temp (!) 97 4 °F (36 3 °C) (Tympanic)   Resp 16   Ht 5' 7" (1 702 m)   Wt 86 6 kg (191 lb)   SpO2 99%   BMI 29 91 kg/m²          Physical Exam   Constitutional: He is oriented to person, place, and time  He appears well-developed and well-nourished  HENT:   Head: Normocephalic  Eyes: Pupils are equal, round, and reactive to light  Neck: Normal range of motion  Neck supple  No JVD present  No tracheal deviation present  No thyromegaly present  Cardiovascular: Normal rate, regular rhythm, normal heart sounds and intact distal pulses  Exam reveals no gallop and no friction rub  No murmur heard  Pulmonary/Chest: Effort normal and breath sounds normal    Abdominal: Soft  Bowel sounds are normal    Musculoskeletal: Normal range of motion     Neurological: He is alert and oriented to person, place, and time  Skin: Skin is warm  No visits with results within 4 Month(s) from this visit  Latest known visit with results is:   Appointment on 05/29/2018   Component Date Value Ref Range Status    Sodium 05/29/2018 138  136 - 145 mmol/L Final    Potassium 05/29/2018 4 1  3 5 - 5 3 mmol/L Final    Chloride 05/29/2018 103  100 - 108 mmol/L Final    CO2 05/29/2018 29  21 - 32 mmol/L Final    ANION GAP 05/29/2018 6  4 - 13 mmol/L Final    BUN 05/29/2018 14  5 - 25 mg/dL Final    Creatinine 05/29/2018 0 83  0 60 - 1 30 mg/dL Final    Standardized to IDMS reference method    Glucose, Fasting 05/29/2018 133* 65 - 99 mg/dL Final      Specimen collection should occur prior to Sulfasalazine administration due to the potential for falsely depressed results  Specimen collection should occur prior to Sulfapyridine administration due to the potential for falsely elevated results   Calcium 05/29/2018 10 6* 8 3 - 10 1 mg/dL Final    eGFR 05/29/2018 90  ml/min/1 73sq m Final    Hemoglobin A1C 05/29/2018 6 4* 4 2 - 6 3 % Final    EAG 05/29/2018 137  mg/dl Final    Albumin 05/29/2018 4 1  3 5 - 5 0 g/dL Final    Corrected Calcium 05/29/2018 10 5* 8 3 - 10 1 mg/dL Final    CALCIUM FOR CA/ALB 05/29/2018 10 6* 8 3 - 10 1 mg/dL Final       No results found

## 2018-10-17 DIAGNOSIS — E10.9 TYPE 1 DIABETES MELLITUS WITHOUT COMPLICATION (HCC): Primary | ICD-10-CM

## 2018-10-18 DIAGNOSIS — E10.9 TYPE 1 DIABETES MELLITUS WITHOUT COMPLICATION (HCC): Primary | ICD-10-CM

## 2018-10-31 DIAGNOSIS — E11.9 TYPE 2 DIABETES MELLITUS WITHOUT COMPLICATION, WITH LONG-TERM CURRENT USE OF INSULIN (HCC): ICD-10-CM

## 2018-10-31 DIAGNOSIS — Z79.4 TYPE 2 DIABETES MELLITUS WITHOUT COMPLICATION, WITH LONG-TERM CURRENT USE OF INSULIN (HCC): ICD-10-CM

## 2018-11-07 DIAGNOSIS — M19.90 ARTHRITIS: Primary | ICD-10-CM

## 2018-11-20 DIAGNOSIS — E11.9 TYPE 2 DIABETES MELLITUS WITHOUT COMPLICATION, WITH LONG-TERM CURRENT USE OF INSULIN (HCC): ICD-10-CM

## 2018-11-20 DIAGNOSIS — Z79.4 TYPE 2 DIABETES MELLITUS WITHOUT COMPLICATION, WITH LONG-TERM CURRENT USE OF INSULIN (HCC): ICD-10-CM

## 2018-11-29 DIAGNOSIS — I10 HYPERTENSION, UNSPECIFIED TYPE: ICD-10-CM

## 2018-11-29 DIAGNOSIS — E78.5 HYPERLIPIDEMIA, UNSPECIFIED HYPERLIPIDEMIA TYPE: ICD-10-CM

## 2018-11-30 RX ORDER — HYDROCHLOROTHIAZIDE 25 MG/1
25 TABLET ORAL DAILY
Qty: 90 TABLET | Refills: 1 | Status: SHIPPED | OUTPATIENT
Start: 2018-11-30 | End: 2019-05-22 | Stop reason: SDUPTHER

## 2018-11-30 RX ORDER — SIMVASTATIN 20 MG
20 TABLET ORAL
Qty: 90 TABLET | Refills: 1 | Status: SHIPPED | OUTPATIENT
Start: 2018-11-30 | End: 2019-05-22 | Stop reason: SDUPTHER

## 2018-12-07 DIAGNOSIS — I10 HYPERTENSION, UNSPECIFIED TYPE: ICD-10-CM

## 2018-12-07 RX ORDER — AMLODIPINE BESYLATE 10 MG/1
10 TABLET ORAL DAILY
Qty: 90 TABLET | Refills: 1 | Status: SHIPPED | OUTPATIENT
Start: 2018-12-07 | End: 2019-06-05 | Stop reason: SDUPTHER

## 2018-12-07 RX ORDER — BENAZEPRIL HYDROCHLORIDE 20 MG/1
20 TABLET ORAL DAILY
Qty: 90 TABLET | Refills: 1 | Status: SHIPPED | OUTPATIENT
Start: 2018-12-07 | End: 2019-06-05 | Stop reason: SDUPTHER

## 2019-01-02 DIAGNOSIS — E11.8 TYPE 2 DIABETES MELLITUS WITH COMPLICATION, WITHOUT LONG-TERM CURRENT USE OF INSULIN (HCC): ICD-10-CM

## 2019-01-07 DIAGNOSIS — Z11.59 ENCOUNTER FOR HEPATITIS C SCREENING TEST FOR LOW RISK PATIENT: ICD-10-CM

## 2019-01-07 DIAGNOSIS — E11.8 TYPE 2 DIABETES MELLITUS WITH COMPLICATION, WITH LONG-TERM CURRENT USE OF INSULIN (HCC): Primary | ICD-10-CM

## 2019-01-07 DIAGNOSIS — Z01.818 PREOP EXAMINATION: Primary | ICD-10-CM

## 2019-01-07 DIAGNOSIS — Z79.4 TYPE 2 DIABETES MELLITUS WITH COMPLICATION, WITH LONG-TERM CURRENT USE OF INSULIN (HCC): Primary | ICD-10-CM

## 2019-01-15 ENCOUNTER — APPOINTMENT (OUTPATIENT)
Dept: LAB | Facility: CLINIC | Age: 69
End: 2019-01-15
Payer: MEDICARE

## 2019-01-15 ENCOUNTER — APPOINTMENT (OUTPATIENT)
Dept: RADIOLOGY | Facility: CLINIC | Age: 69
End: 2019-01-15
Payer: MEDICARE

## 2019-01-15 ENCOUNTER — CLINICAL SUPPORT (OUTPATIENT)
Dept: URGENT CARE | Facility: CLINIC | Age: 69
End: 2019-01-15
Payer: MEDICARE

## 2019-01-15 DIAGNOSIS — Z01.818 PREOP EXAMINATION: ICD-10-CM

## 2019-01-15 DIAGNOSIS — E78.5 HYPERLIPIDEMIA, UNSPECIFIED HYPERLIPIDEMIA TYPE: ICD-10-CM

## 2019-01-15 DIAGNOSIS — I10 ESSENTIAL HYPERTENSION: ICD-10-CM

## 2019-01-15 DIAGNOSIS — Z11.59 ENCOUNTER FOR HEPATITIS C SCREENING TEST FOR LOW RISK PATIENT: ICD-10-CM

## 2019-01-15 DIAGNOSIS — J20.9 ACUTE BRONCHITIS, UNSPECIFIED ORGANISM: ICD-10-CM

## 2019-01-15 DIAGNOSIS — E11.8 TYPE 2 DIABETES MELLITUS WITH COMPLICATION, WITHOUT LONG-TERM CURRENT USE OF INSULIN (HCC): ICD-10-CM

## 2019-01-15 LAB
ALBUMIN SERPL BCP-MCNC: 4.4 G/DL (ref 3.5–5)
ALP SERPL-CCNC: 50 U/L (ref 46–116)
ALT SERPL W P-5'-P-CCNC: 81 U/L (ref 12–78)
ANION GAP SERPL CALCULATED.3IONS-SCNC: 8 MMOL/L (ref 4–13)
APTT PPP: 26 SECONDS (ref 26–38)
AST SERPL W P-5'-P-CCNC: 38 U/L (ref 5–45)
BASOPHILS # BLD AUTO: 0.09 THOUSANDS/ΜL (ref 0–0.1)
BASOPHILS NFR BLD AUTO: 1 % (ref 0–1)
BILIRUB SERPL-MCNC: 1.86 MG/DL (ref 0.2–1)
BILIRUB UR QL STRIP: NEGATIVE
BUN SERPL-MCNC: 13 MG/DL (ref 5–25)
CALCIUM ALBUM COR SERPL-MCNC: 10.8 MG/DL (ref 8.3–10.1)
CALCIUM SERPL-MCNC: 11.1 MG/DL (ref 8.3–10.1)
CHLORIDE SERPL-SCNC: 101 MMOL/L (ref 100–108)
CHOLEST SERPL-MCNC: 156 MG/DL (ref 50–200)
CLARITY UR: CLEAR
CO2 SERPL-SCNC: 29 MMOL/L (ref 21–32)
COLOR UR: YELLOW
CREAT SERPL-MCNC: 0.73 MG/DL (ref 0.6–1.3)
CREAT UR-MCNC: 71.3 MG/DL
EOSINOPHIL # BLD AUTO: 0.16 THOUSAND/ΜL (ref 0–0.61)
EOSINOPHIL NFR BLD AUTO: 2 % (ref 0–6)
ERYTHROCYTE [DISTWIDTH] IN BLOOD BY AUTOMATED COUNT: 12.7 % (ref 11.6–15.1)
EST. AVERAGE GLUCOSE BLD GHB EST-MCNC: 128 MG/DL
GFR SERPL CREATININE-BSD FRML MDRD: 95 ML/MIN/1.73SQ M
GLUCOSE P FAST SERPL-MCNC: 128 MG/DL (ref 65–99)
GLUCOSE UR STRIP-MCNC: NEGATIVE MG/DL
HBA1C MFR BLD: 6.1 % (ref 4.2–6.3)
HCT VFR BLD AUTO: 46.7 % (ref 36.5–49.3)
HDLC SERPL-MCNC: 49 MG/DL (ref 40–60)
HGB BLD-MCNC: 15.5 G/DL (ref 12–17)
HGB UR QL STRIP.AUTO: NEGATIVE
IMM GRANULOCYTES # BLD AUTO: 0.02 THOUSAND/UL (ref 0–0.2)
IMM GRANULOCYTES NFR BLD AUTO: 0 % (ref 0–2)
INR PPP: 0.91 (ref 0.86–1.17)
KETONES UR STRIP-MCNC: NEGATIVE MG/DL
LDLC SERPL CALC-MCNC: 67 MG/DL (ref 0–100)
LEUKOCYTE ESTERASE UR QL STRIP: NEGATIVE
LYMPHOCYTES # BLD AUTO: 3.28 THOUSANDS/ΜL (ref 0.6–4.47)
LYMPHOCYTES NFR BLD AUTO: 38 % (ref 14–44)
MCH RBC QN AUTO: 28.7 PG (ref 26.8–34.3)
MCHC RBC AUTO-ENTMCNC: 33.2 G/DL (ref 31.4–37.4)
MCV RBC AUTO: 86 FL (ref 82–98)
MICROALBUMIN UR-MCNC: 8.4 MG/L (ref 0–20)
MICROALBUMIN/CREAT 24H UR: 12 MG/G CREATININE (ref 0–30)
MONOCYTES # BLD AUTO: 0.68 THOUSAND/ΜL (ref 0.17–1.22)
MONOCYTES NFR BLD AUTO: 8 % (ref 4–12)
NEUTROPHILS # BLD AUTO: 4.37 THOUSANDS/ΜL (ref 1.85–7.62)
NEUTS SEG NFR BLD AUTO: 51 % (ref 43–75)
NITRITE UR QL STRIP: NEGATIVE
NONHDLC SERPL-MCNC: 107 MG/DL
NRBC BLD AUTO-RTO: 0 /100 WBCS
PH UR STRIP.AUTO: 6 [PH] (ref 4.5–8)
PLATELET # BLD AUTO: 123 THOUSANDS/UL (ref 149–390)
PMV BLD AUTO: 11.9 FL (ref 8.9–12.7)
POTASSIUM SERPL-SCNC: 3.8 MMOL/L (ref 3.5–5.3)
PROT SERPL-MCNC: 7.9 G/DL (ref 6.4–8.2)
PROT UR STRIP-MCNC: NEGATIVE MG/DL
PROTHROMBIN TIME: 12.4 SECONDS (ref 11.8–14.2)
RBC # BLD AUTO: 5.41 MILLION/UL (ref 3.88–5.62)
SODIUM SERPL-SCNC: 138 MMOL/L (ref 136–145)
SP GR UR STRIP.AUTO: 1.01 (ref 1–1.03)
TRIGL SERPL-MCNC: 201 MG/DL
UROBILINOGEN UR QL STRIP.AUTO: 0.2 E.U./DL
WBC # BLD AUTO: 8.6 THOUSAND/UL (ref 4.31–10.16)

## 2019-01-15 PROCEDURE — 85730 THROMBOPLASTIN TIME PARTIAL: CPT

## 2019-01-15 PROCEDURE — 85610 PROTHROMBIN TIME: CPT

## 2019-01-15 PROCEDURE — 81003 URINALYSIS AUTO W/O SCOPE: CPT

## 2019-01-15 PROCEDURE — 80061 LIPID PANEL: CPT

## 2019-01-15 PROCEDURE — 82570 ASSAY OF URINE CREATININE: CPT

## 2019-01-15 PROCEDURE — 93005 ELECTROCARDIOGRAM TRACING: CPT

## 2019-01-15 PROCEDURE — 82043 UR ALBUMIN QUANTITATIVE: CPT

## 2019-01-15 PROCEDURE — 80053 COMPREHEN METABOLIC PANEL: CPT

## 2019-01-15 PROCEDURE — 83036 HEMOGLOBIN GLYCOSYLATED A1C: CPT

## 2019-01-15 PROCEDURE — 85025 COMPLETE CBC W/AUTO DIFF WBC: CPT

## 2019-01-15 PROCEDURE — 36415 COLL VENOUS BLD VENIPUNCTURE: CPT

## 2019-01-15 PROCEDURE — 71046 X-RAY EXAM CHEST 2 VIEWS: CPT

## 2019-01-15 PROCEDURE — 86803 HEPATITIS C AB TEST: CPT

## 2019-01-16 LAB — HCV AB SER QL: NORMAL

## 2019-01-17 LAB
ATRIAL RATE: 67 BPM
P AXIS: 72 DEGREES
PR INTERVAL: 116 MS
QRS AXIS: 66 DEGREES
QRSD INTERVAL: 96 MS
QT INTERVAL: 376 MS
QTC INTERVAL: 397 MS
T WAVE AXIS: 28 DEGREES
VENTRICULAR RATE: 67 BPM

## 2019-01-17 PROCEDURE — 93010 ELECTROCARDIOGRAM REPORT: CPT

## 2019-01-21 DIAGNOSIS — Z12.11 SCREENING FOR COLON CANCER: ICD-10-CM

## 2019-01-21 DIAGNOSIS — E11.9 ENCOUNTER FOR DIABETIC FOOT EXAM (HCC): Primary | ICD-10-CM

## 2019-01-22 DIAGNOSIS — E11.9 ENCOUNTER FOR DIABETIC FOOT EXAM (HCC): Primary | ICD-10-CM

## 2019-01-22 DIAGNOSIS — Z01.00 DIABETIC EYE EXAM (HCC): ICD-10-CM

## 2019-01-22 DIAGNOSIS — E11.9 DIABETIC EYE EXAM (HCC): ICD-10-CM

## 2019-01-23 ENCOUNTER — CONSULT (OUTPATIENT)
Dept: FAMILY MEDICINE CLINIC | Facility: CLINIC | Age: 69
End: 2019-01-23
Payer: MEDICARE

## 2019-01-23 VITALS
BODY MASS INDEX: 29.95 KG/M2 | DIASTOLIC BLOOD PRESSURE: 88 MMHG | HEIGHT: 67 IN | TEMPERATURE: 97.2 F | HEART RATE: 79 BPM | OXYGEN SATURATION: 98 % | WEIGHT: 190.8 LBS | RESPIRATION RATE: 16 BRPM | SYSTOLIC BLOOD PRESSURE: 150 MMHG

## 2019-01-23 DIAGNOSIS — E11.8 TYPE 2 DIABETES MELLITUS WITH COMPLICATION, WITHOUT LONG-TERM CURRENT USE OF INSULIN (HCC): ICD-10-CM

## 2019-01-23 DIAGNOSIS — E78.5 HYPERLIPIDEMIA, UNSPECIFIED HYPERLIPIDEMIA TYPE: ICD-10-CM

## 2019-01-23 DIAGNOSIS — I10 ESSENTIAL HYPERTENSION: ICD-10-CM

## 2019-01-23 DIAGNOSIS — Z01.00 DIABETIC EYE EXAM (HCC): ICD-10-CM

## 2019-01-23 DIAGNOSIS — E11.9 ENCOUNTER FOR DIABETIC FOOT EXAM (HCC): Primary | ICD-10-CM

## 2019-01-23 DIAGNOSIS — E11.9 DIABETIC EYE EXAM (HCC): ICD-10-CM

## 2019-01-23 PROCEDURE — 99214 OFFICE O/P EST MOD 30 MIN: CPT | Performed by: INTERNAL MEDICINE

## 2019-01-23 NOTE — PROGRESS NOTES
Vitals:    01/23/19 1323   BP: 150/88   Pulse: 79   Resp: 16   Temp: (!) 97 2 °F (36 2 °C)   TempSrc: Tympanic   SpO2: 98%   Weight: 86 5 kg (190 lb 12 8 oz)   Height: 5' 7" (1 702 m)       Assessment/Plan:  Patient with longstanding history of hypertension blood pressure is moderately elevated patient is on maximum drug therapy at this time  He has no history of any heart disease  Type 2 diabetes with adequate control last A1c 6 1  Hyperlipidemia on statin therapy  Patient's meds were all reviewed and discussed with the patient will continue all meds  Preop evaluation was completed patient is cleared for his eye surgery for glaucoma    Diagnoses and all orders for this visit:    Encounter for diabetic foot exam ([facility] )  -     Diabetic foot exam; Future    Diabetic eye exam [facility])  -     Ambulatory referral to Ophthalmology; Future        Subjective:      Patient ID: Denisse Magana is a 76 y o  male  58-year-old male with longstanding history of hypertension type 2 diabetes and hyperlipidemia patient comes for a routine scheduled visit  Patient's blood sugar is quite satisfactory A1c 6 1  Patient is scheduled for glaucoma surgery and needs a preop evaluation   Patient's recent lab data shows normal CBC Chem profile and lipids are satisfactory A1c 6 1          The following portions of the patient's history were reviewed and updated as appropriate: allergies, current medications, past family history, past medical history, past social history, past surgical history and problem list       Current Outpatient Prescriptions:     amLODIPine (NORVASC) 10 mg tablet, Take 1 tablet (10 mg total) by mouth daily, Disp: 90 tablet, Rfl: 1    aspirin 81 MG tablet, Take 81 mg by mouth daily, Disp: , Rfl:     atenolol (TENORMIN) 50 mg tablet, Take 1 tablet (50 mg total) by mouth daily, Disp: 90 tablet, Rfl: 3    benazepril (LOTENSIN) 20 mg tablet, Take 1 tablet (20 mg total) by mouth daily, Disp: 90 tablet, Rfl: 1   COMBIGAN 0 2-0 5 %, , Disp: , Rfl:     diclofenac sodium (VOLTAREN) 1 %, Apply 2 g topically 2 (two) times a day, Disp: 1 Tube, Rfl: 2    glucose blood (ONE TOUCH ULTRA TEST) test strip, 1 each by Other route daily Use as instructed, Disp: 50 each, Rfl: 3    hydrochlorothiazide (HYDRODIURIL) 25 mg tablet, Take 1 tablet (25 mg total) by mouth daily, Disp: 90 tablet, Rfl: 1    metFORMIN (GLUCOPHAGE) 1000 MG tablet, Take 1 tablet (1,000 mg total) by mouth 2 (two) times a day with meals, Disp: 180 tablet, Rfl: 1    ONETOUCH DELICA LANCETS FINE MISC, by Does not apply route daily, Disp: 50 each, Rfl: 2    RHOPRESSA 0 02 % SOLN, , Disp: , Rfl:     simvastatin (ZOCOR) 20 mg tablet, Take 1 tablet (20 mg total) by mouth daily at bedtime, Disp: 90 tablet, Rfl: 1    sitaGLIPtin (JANUVIA) 50 mg tablet, Take 1 tablet (50 mg total) by mouth daily, Disp: 90 tablet, Rfl: 1    travoprost (TRAVATAN Z) 0 004 % ophthalmic solution, Administer 1 drop to both eyes daily at bedtime, Disp: , Rfl:     Past Medical History:   Diagnosis Date    Cerebral vascular disease     Diabetes mellitus ([address] )     Hyperlipidemia     Hypertension        Past Surgical History:   Procedure Laterality Date    CATARACT EXTRACTION      GANGLION CYST EXCISION      SKIN LESION EXCISION         Social History       Patient Active Problem List   Diagnosis    Hypertension    Hyperlipidemia    Diabetes mellitus ([address] )    Cardiovascular disease    Neoplasm of skin    Obesity           Review of Systems   Constitutional: Negative  HENT: Negative  Eyes: Negative  Respiratory: Negative  Cardiovascular: Negative  Gastrointestinal: Negative  Endocrine: Negative  Genitourinary: Negative  Musculoskeletal: Negative  Skin: Negative  Allergic/Immunologic: Negative  Neurological: Negative  Hematological: Negative  Psychiatric/Behavioral: Negative            Objective:      /88   Pulse 79   Temp (!) 97 2 °F (36 2 °C) (Tympanic)   Resp 16   Ht 5' 7" (1 702 m)   Wt 86 5 kg (190 lb 12 8 oz)   SpO2 98%   BMI 29 88 kg/m²          Physical Exam   Constitutional: He is oriented to person, place, and time  He appears well-developed and well-nourished  HENT:   Head: Normocephalic  Eyes: Pupils are equal, round, and reactive to light  Neck: Normal range of motion  Neck supple  No JVD present  No tracheal deviation present  No thyromegaly present  Cardiovascular: Normal rate, regular rhythm, normal heart sounds and intact distal pulses  Exam reveals no gallop and no friction rub  Pulses are no weak pulses  No murmur heard  Pulses:       Dorsalis pedis pulses are 1+ on the right side, and 1+ on the left side  Posterior tibial pulses are 1+ on the right side, and 1+ on the left side  Pulmonary/Chest: Effort normal and breath sounds normal    Abdominal: Soft  Bowel sounds are normal    Musculoskeletal: Normal range of motion  Feet:   Right Foot:   Skin Integrity: Negative for ulcer, skin breakdown, erythema, warmth, callus or dry skin  Left Foot:   Skin Integrity: Negative for ulcer, skin breakdown, erythema, warmth, callus or dry skin  Neurological: He is alert and oriented to person, place, and time  Skin: Skin is warm             Clinical Support on 01/15/2019   Component Date Value Ref Range Status    Ventricular Rate 01/15/2019 67  BPM Final    Atrial Rate 01/15/2019 67  BPM Final    NJ Interval 01/15/2019 116  ms Final    QRSD Interval 01/15/2019 96  ms Final    QT Interval 01/15/2019 376  ms Final    QTC Interval 01/15/2019 397  ms Final    P Axis 01/15/2019 72  degrees Final    QRS Axis 01/15/2019 66  degrees Final    T Wave Axis 01/15/2019 28  degrees Final   Appointment on 01/15/2019   Component Date Value Ref Range Status    WBC 01/15/2019 8 60  4 31 - 10 16 Thousand/uL Final    RBC 01/15/2019 5 41  3 88 - 5 62 Million/uL Final    Hemoglobin 01/15/2019 15 5  12 0 - 17 0 g/dL Final  Hematocrit 01/15/2019 46 7  36 5 - 49 3 % Final    MCV 01/15/2019 86  82 - 98 fL Final    MCH 01/15/2019 28 7  26 8 - 34 3 pg Final    MCHC 01/15/2019 33 2  31 4 - 37 4 g/dL Final    RDW 01/15/2019 12 7  11 6 - 15 1 % Final    MPV 01/15/2019 11 9  8 9 - 12 7 fL Final    Platelets 31/19/3610 123* 149 - 390 Thousands/uL Final    nRBC 01/15/2019 0  /100 WBCs Final    Neutrophils Relative 01/15/2019 51  43 - 75 % Final    Immat GRANS % 01/15/2019 0  0 - 2 % Final    Lymphocytes Relative 01/15/2019 38  14 - 44 % Final    Monocytes Relative 01/15/2019 8  4 - 12 % Final    Eosinophils Relative 01/15/2019 2  0 - 6 % Final    Basophils Relative 01/15/2019 1  0 - 1 % Final    Neutrophils Absolute 01/15/2019 4 37  1 85 - 7 62 Thousands/µL Final    Immature Grans Absolute 01/15/2019 0 02  0 00 - 0 20 Thousand/uL Final    Lymphocytes Absolute 01/15/2019 3 28  0 60 - 4 47 Thousands/µL Final    Monocytes Absolute 01/15/2019 0 68  0 17 - 1 22 Thousand/µL Final    Eosinophils Absolute 01/15/2019 0 16  0 00 - 0 61 Thousand/µL Final    Basophils Absolute 01/15/2019 0 09  0 00 - 0 10 Thousands/µL Final    Cholesterol 01/15/2019 156  50 - 200 mg/dL Final      Cholesterol:       Desirable         <200 mg/dl       Borderline         200-239 mg/dl       High              >239           Triglycerides 01/15/2019 201* <=150 mg/dL Final      Triglyceride:     Normal          <150 mg/dl     Borderline High 150-199 mg/dl     High            200-499 mg/dl        Very High       >499 mg/dl    Specimen collection should occur prior to N-Acetylcysteine or Metamizole administration due to the potential for falsely depressed results   HDL, Direct 01/15/2019 49  40 - 60 mg/dL Final      HDL Cholesterol:       High    >60 mg/dL       Low     <41 mg/dL  Specimen collection should occur prior to Metamizole administration due to the potential for falsley depressed results      LDL Calculated 01/15/2019 67  0 - 100 mg/dL Final LDL Cholesterol:     Optimal           <100 mg/dl     Near Optimal      100-129 mg/dl     Above Optimal       Borderline High 130-159 mg/dl       High            160-189 mg/dl       Very High       >189 mg/dl         This screening LDL is a calculated result  It does not have the accuracy of the Direct Measured LDL in the monitoring of patients with hyperlipidemia and/or statin therapy  Direct Measure LDL (KQU182) must be ordered separately in these patients   Non-HDL-Chol (CHOL-HDL) 01/15/2019 107  mg/dl Final    Hemoglobin A1C 01/15/2019 6 1  4 2 - 6 3 % Final    EAG 01/15/2019 128  mg/dl Final    Hepatitis C Ab 01/15/2019 Non-reactive  Non-reactive Final    PTT 01/15/2019 26  26 - 38 seconds Final    Therapeutic Heparin Range =  60-90 seconds    Protime 01/15/2019 12 4  11 8 - 14 2 seconds Final    INR 01/15/2019 0 91  0 86 - 1 17 Final    Sodium 01/15/2019 138  136 - 145 mmol/L Final    Potassium 01/15/2019 3 8  3 5 - 5 3 mmol/L Final    Chloride 01/15/2019 101  100 - 108 mmol/L Final    CO2 01/15/2019 29  21 - 32 mmol/L Final    ANION GAP 01/15/2019 8  4 - 13 mmol/L Final    BUN 01/15/2019 13  5 - 25 mg/dL Final    Creatinine 01/15/2019 0 73  0 60 - 1 30 mg/dL Final    Standardized to IDMS reference method    Glucose, Fasting 01/15/2019 128* 65 - 99 mg/dL Final      Specimen collection should occur prior to Sulfasalazine administration due to the potential for falsely depressed results  Specimen collection should occur prior to Sulfapyridine administration due to the potential for falsely elevated results   Calcium 01/15/2019 11 1* 8 3 - 10 1 mg/dL Final    Corrected Calcium 01/15/2019 10 8* 8 3 - 10 1 mg/dL Final    AST 01/15/2019 38  5 - 45 U/L Final      Specimen collection should occur prior to Sulfasalazine administration due to the potential for falsely depressed results       ALT 01/15/2019 81* 12 - 78 U/L Final      Specimen collection should occur prior to Sulfasalazine and/or Sulfapyridine administration due to the potential for falsely depressed results   Alkaline Phosphatase 01/15/2019 50  46 - 116 U/L Final    Total Protein 01/15/2019 7 9  6 4 - 8 2 g/dL Final    Albumin 01/15/2019 4 4  3 5 - 5 0 g/dL Final    Total Bilirubin 01/15/2019 1 86* 0 20 - 1 00 mg/dL Final    eGFR 01/15/2019 95  ml/min/1 73sq m Final   Orders Only on 01/07/2019   Component Date Value Ref Range Status    Color, UA 01/15/2019 Yellow   Final    Clarity, UA 01/15/2019 Clear   Final    Specific Gravity, UA 01/15/2019 1 015  1 003 - 1 030 Final    pH, UA 01/15/2019 6 0  4 5 - 8 0 Final    Leukocytes, UA 01/15/2019 Negative  Negative Final    Nitrite, UA 01/15/2019 Negative  Negative Final    Protein, UA 01/15/2019 Negative  Negative mg/dl Final    Glucose, UA 01/15/2019 Negative  Negative mg/dl Final    Ketones, UA 01/15/2019 Negative  Negative mg/dl Final    Urobilinogen, UA 01/15/2019 0 2  0 2, 1 0 E U /dl E U /dl Final    Bilirubin, UA 01/15/2019 Negative  Negative Final    Blood, UA 01/15/2019 Negative  Negative Final   Orders Only on 01/07/2019   Component Date Value Ref Range Status    Creatinine, Ur 01/15/2019 71 3  mg/dL Final    Microalbum  ,U,Random 01/15/2019 8 4  0 0 - 20 0 mg/L Final    Microalb Creat Ratio 01/15/2019 12  0 - 30 mg/g creatinine Final       No results found  Social History     Social History Narrative    No narrative on file       Family History   Problem Relation Age of Onset    Heart disease Mother     Asthma Mother     Emphysema Mother     Hypertension Mother     Diabetes Father        Past Surgical History:   Procedure Laterality Date    CATARACT EXTRACTION      GANGLION CYST EXCISION      SKIN LESION EXCISION         Allergies   Allergen Reactions    Allopurinol     Sulfa Antibiotics      Patient's shoes and socks removed  Right Foot/Ankle   Right Foot Inspection  Skin Exam: skin normal and skin intact no dry skin, no warmth, no callus, no erythema, no maceration, no abnormal color, no pre-ulcer, no ulcer and no callus                          Toe Exam: ROM and strength within normal limits  Sensory   Vibration: intact  Proprioception: intact   Monofilament testing: intact  Vascular  Capillary refills: < 3 seconds  The right DP pulse is 1+  The right PT pulse is 1+  Right Toe  - Comprehensive Exam  Ecchymosis: none  Arch: normal  Hammertoes: absent  Claw Toes: absent  Swelling: none   Tenderness: none         Left Foot/Ankle  Left Foot Inspection  Skin Exam: skin normal and skin intactno dry skin, no warmth, no erythema, no maceration, normal color, no pre-ulcer, no ulcer and no callus                         Toe Exam: ROM and strength within normal limits                   Sensory   Vibration: intact  Proprioception: intact  Monofilament: intact  Vascular  Capillary refills: < 3 seconds  The left DP pulse is 1+  The left PT pulse is 1+  Left Toe  - Comprehensive Exam  Ecchymosis: none  Arch: normal  Hammertoes: absent  Claw toes: absent  Swelling: none   Tenderness: none       Assign Risk Category:  No deformity present; No loss of protective sensation;  No weak pulses       Risk: 0

## 2019-04-15 DIAGNOSIS — E08.22 DIABETES MELLITUS DUE TO UNDERLYING CONDITION WITH STAGE 4 CHRONIC KIDNEY DISEASE, WITH LONG-TERM CURRENT USE OF INSULIN (HCC): Primary | ICD-10-CM

## 2019-04-15 DIAGNOSIS — N18.4 DIABETES MELLITUS DUE TO UNDERLYING CONDITION WITH STAGE 4 CHRONIC KIDNEY DISEASE, WITH LONG-TERM CURRENT USE OF INSULIN (HCC): Primary | ICD-10-CM

## 2019-04-15 DIAGNOSIS — Z79.4 DIABETES MELLITUS DUE TO UNDERLYING CONDITION WITH STAGE 4 CHRONIC KIDNEY DISEASE, WITH LONG-TERM CURRENT USE OF INSULIN (HCC): Primary | ICD-10-CM

## 2019-04-17 LAB
LEFT EYE DIABETIC RETINOPATHY: NORMAL
RIGHT EYE DIABETIC RETINOPATHY: NORMAL

## 2019-04-29 ENCOUNTER — OFFICE VISIT (OUTPATIENT)
Dept: FAMILY MEDICINE CLINIC | Facility: CLINIC | Age: 69
End: 2019-04-29
Payer: MEDICARE

## 2019-04-29 VITALS
TEMPERATURE: 98.2 F | HEART RATE: 76 BPM | OXYGEN SATURATION: 100 % | SYSTOLIC BLOOD PRESSURE: 138 MMHG | WEIGHT: 192 LBS | HEIGHT: 67 IN | BODY MASS INDEX: 30.13 KG/M2 | DIASTOLIC BLOOD PRESSURE: 66 MMHG

## 2019-04-29 DIAGNOSIS — Z00.01 ENCOUNTER FOR ANNUAL GENERAL MEDICAL EXAMINATION WITH ABNORMAL FINDINGS IN ADULT: ICD-10-CM

## 2019-04-29 DIAGNOSIS — E11.9 TYPE 2 DIABETES MELLITUS WITHOUT COMPLICATION, WITHOUT LONG-TERM CURRENT USE OF INSULIN (HCC): ICD-10-CM

## 2019-04-29 DIAGNOSIS — R79.89 ABNORMAL LFTS (LIVER FUNCTION TESTS): ICD-10-CM

## 2019-04-29 DIAGNOSIS — I10 ESSENTIAL HYPERTENSION: ICD-10-CM

## 2019-04-29 DIAGNOSIS — E11.8 TYPE 2 DIABETES MELLITUS WITH COMPLICATION, WITHOUT LONG-TERM CURRENT USE OF INSULIN (HCC): ICD-10-CM

## 2019-04-29 DIAGNOSIS — Z12.11 SCREENING FOR COLON CANCER: Primary | ICD-10-CM

## 2019-04-29 PROCEDURE — G0439 PPPS, SUBSEQ VISIT: HCPCS | Performed by: INTERNAL MEDICINE

## 2019-04-29 PROCEDURE — 99214 OFFICE O/P EST MOD 30 MIN: CPT | Performed by: INTERNAL MEDICINE

## 2019-05-03 ENCOUNTER — APPOINTMENT (OUTPATIENT)
Dept: LAB | Facility: CLINIC | Age: 69
End: 2019-05-03
Payer: MEDICARE

## 2019-05-03 DIAGNOSIS — N18.4 DIABETES MELLITUS DUE TO UNDERLYING CONDITION WITH STAGE 4 CHRONIC KIDNEY DISEASE, WITH LONG-TERM CURRENT USE OF INSULIN (HCC): ICD-10-CM

## 2019-05-03 DIAGNOSIS — E11.9 TYPE 2 DIABETES MELLITUS WITHOUT COMPLICATION, WITHOUT LONG-TERM CURRENT USE OF INSULIN (HCC): ICD-10-CM

## 2019-05-03 DIAGNOSIS — E08.22 DIABETES MELLITUS DUE TO UNDERLYING CONDITION WITH STAGE 4 CHRONIC KIDNEY DISEASE, WITH LONG-TERM CURRENT USE OF INSULIN (HCC): ICD-10-CM

## 2019-05-03 DIAGNOSIS — Z00.01 ENCOUNTER FOR ANNUAL GENERAL MEDICAL EXAMINATION WITH ABNORMAL FINDINGS IN ADULT: ICD-10-CM

## 2019-05-03 DIAGNOSIS — Z79.4 DIABETES MELLITUS DUE TO UNDERLYING CONDITION WITH STAGE 4 CHRONIC KIDNEY DISEASE, WITH LONG-TERM CURRENT USE OF INSULIN (HCC): ICD-10-CM

## 2019-05-03 LAB
ALBUMIN SERPL BCP-MCNC: 3.9 G/DL (ref 3.5–5)
ALP SERPL-CCNC: 53 U/L (ref 46–116)
ALT SERPL W P-5'-P-CCNC: 78 U/L (ref 12–78)
ANION GAP SERPL CALCULATED.3IONS-SCNC: 3 MMOL/L (ref 4–13)
AST SERPL W P-5'-P-CCNC: 37 U/L (ref 5–45)
BILIRUB SERPL-MCNC: 1.67 MG/DL (ref 0.2–1)
BUN SERPL-MCNC: 13 MG/DL (ref 5–25)
CALCIUM ALBUM COR SERPL-MCNC: 10.7 MG/DL (ref 8.3–10.1)
CALCIUM SERPL-MCNC: 10.6 MG/DL (ref 8.3–10.1)
CHLORIDE SERPL-SCNC: 103 MMOL/L (ref 100–108)
CO2 SERPL-SCNC: 32 MMOL/L (ref 21–32)
CREAT SERPL-MCNC: 0.81 MG/DL (ref 0.6–1.3)
EST. AVERAGE GLUCOSE BLD GHB EST-MCNC: 128 MG/DL
GFR SERPL CREATININE-BSD FRML MDRD: 91 ML/MIN/1.73SQ M
GLUCOSE P FAST SERPL-MCNC: 134 MG/DL (ref 65–99)
HBA1C MFR BLD: 6.1 % (ref 4.2–6.3)
POTASSIUM SERPL-SCNC: 3.9 MMOL/L (ref 3.5–5.3)
PROT SERPL-MCNC: 7.2 G/DL (ref 6.4–8.2)
PSA SERPL-MCNC: 1 NG/ML (ref 0–4)
SODIUM SERPL-SCNC: 138 MMOL/L (ref 136–145)

## 2019-05-03 PROCEDURE — 80053 COMPREHEN METABOLIC PANEL: CPT

## 2019-05-03 PROCEDURE — 36415 COLL VENOUS BLD VENIPUNCTURE: CPT

## 2019-05-03 PROCEDURE — 83036 HEMOGLOBIN GLYCOSYLATED A1C: CPT

## 2019-05-03 PROCEDURE — G0103 PSA SCREENING: HCPCS

## 2019-05-22 DIAGNOSIS — I10 HYPERTENSION, UNSPECIFIED TYPE: ICD-10-CM

## 2019-05-22 DIAGNOSIS — E11.9 TYPE 2 DIABETES MELLITUS WITHOUT COMPLICATION, WITH LONG-TERM CURRENT USE OF INSULIN (HCC): ICD-10-CM

## 2019-05-22 DIAGNOSIS — E78.5 HYPERLIPIDEMIA, UNSPECIFIED HYPERLIPIDEMIA TYPE: ICD-10-CM

## 2019-05-22 DIAGNOSIS — Z79.4 TYPE 2 DIABETES MELLITUS WITHOUT COMPLICATION, WITH LONG-TERM CURRENT USE OF INSULIN (HCC): ICD-10-CM

## 2019-05-22 RX ORDER — HYDROCHLOROTHIAZIDE 25 MG/1
25 TABLET ORAL DAILY
Qty: 90 TABLET | Refills: 1 | Status: SHIPPED | OUTPATIENT
Start: 2019-05-22 | End: 2019-11-22 | Stop reason: SDUPTHER

## 2019-05-22 RX ORDER — SIMVASTATIN 20 MG
20 TABLET ORAL
Qty: 90 TABLET | Refills: 1 | Status: SHIPPED | OUTPATIENT
Start: 2019-05-22 | End: 2019-11-22 | Stop reason: SDUPTHER

## 2019-06-05 DIAGNOSIS — I10 HYPERTENSION, UNSPECIFIED TYPE: ICD-10-CM

## 2019-06-05 RX ORDER — BENAZEPRIL HYDROCHLORIDE 20 MG/1
20 TABLET ORAL DAILY
Qty: 90 TABLET | Refills: 1 | Status: SHIPPED | OUTPATIENT
Start: 2019-06-05 | End: 2019-12-03 | Stop reason: SDUPTHER

## 2019-06-05 RX ORDER — AMLODIPINE BESYLATE 10 MG/1
10 TABLET ORAL DAILY
Qty: 90 TABLET | Refills: 1 | Status: SHIPPED | OUTPATIENT
Start: 2019-06-05 | End: 2019-12-03 | Stop reason: SDUPTHER

## 2019-06-10 ENCOUNTER — CONSULT (OUTPATIENT)
Dept: FAMILY MEDICINE CLINIC | Facility: CLINIC | Age: 69
End: 2019-06-10
Payer: MEDICARE

## 2019-06-10 VITALS
BODY MASS INDEX: 30.13 KG/M2 | HEART RATE: 79 BPM | TEMPERATURE: 98.2 F | DIASTOLIC BLOOD PRESSURE: 80 MMHG | OXYGEN SATURATION: 100 % | WEIGHT: 192 LBS | RESPIRATION RATE: 18 BRPM | SYSTOLIC BLOOD PRESSURE: 150 MMHG | HEIGHT: 67 IN

## 2019-06-10 DIAGNOSIS — Z01.818 PRE-OP EXAMINATION: Primary | ICD-10-CM

## 2019-06-10 DIAGNOSIS — E11.8 TYPE 2 DIABETES MELLITUS WITH COMPLICATION, WITHOUT LONG-TERM CURRENT USE OF INSULIN (HCC): ICD-10-CM

## 2019-06-10 PROCEDURE — 99214 OFFICE O/P EST MOD 30 MIN: CPT | Performed by: INTERNAL MEDICINE

## 2019-06-10 RX ORDER — TRAVOPROST 0.004 %
DROPS OPHTHALMIC (EYE)
Refills: 0 | COMMUNITY
Start: 2019-04-24 | End: 2020-01-01

## 2019-07-12 ENCOUNTER — APPOINTMENT (OUTPATIENT)
Dept: LAB | Facility: CLINIC | Age: 69
End: 2019-07-12
Payer: MEDICARE

## 2019-07-12 ENCOUNTER — TRANSCRIBE ORDERS (OUTPATIENT)
Dept: ADMINISTRATIVE | Facility: HOSPITAL | Age: 69
End: 2019-07-12

## 2019-07-12 DIAGNOSIS — H40.1133 PRIMARY OPEN ANGLE GLAUCOMA OF BOTH EYES, SEVERE STAGE: Primary | ICD-10-CM

## 2019-07-12 DIAGNOSIS — Z01.812 PRE-OPERATIVE LABORATORY EXAMINATION: ICD-10-CM

## 2019-07-12 DIAGNOSIS — H40.1133 PRIMARY OPEN ANGLE GLAUCOMA OF BOTH EYES, SEVERE STAGE: ICD-10-CM

## 2019-07-12 LAB
ALBUMIN SERPL BCP-MCNC: 4.1 G/DL (ref 3.5–5)
ANION GAP SERPL CALCULATED.3IONS-SCNC: 7 MMOL/L (ref 4–13)
BUN SERPL-MCNC: 12 MG/DL (ref 5–25)
CALCIUM ALBUM COR SERPL-MCNC: 10.8 MG/DL (ref 8.3–10.1)
CALCIUM SERPL-MCNC: 10.9 MG/DL (ref 8.3–10.1)
CALCIUM SERPL-MCNC: 10.9 MG/DL (ref 8.3–10.1)
CHLORIDE SERPL-SCNC: 104 MMOL/L (ref 100–108)
CO2 SERPL-SCNC: 27 MMOL/L (ref 21–32)
CREAT SERPL-MCNC: 0.7 MG/DL (ref 0.6–1.3)
GFR SERPL CREATININE-BSD FRML MDRD: 96 ML/MIN/1.73SQ M
GLUCOSE P FAST SERPL-MCNC: 133 MG/DL (ref 65–99)
POTASSIUM SERPL-SCNC: 3.8 MMOL/L (ref 3.5–5.3)
SODIUM SERPL-SCNC: 138 MMOL/L (ref 136–145)

## 2019-07-12 PROCEDURE — 82040 ASSAY OF SERUM ALBUMIN: CPT

## 2019-07-12 PROCEDURE — 36415 COLL VENOUS BLD VENIPUNCTURE: CPT

## 2019-07-12 PROCEDURE — 80048 BASIC METABOLIC PNL TOTAL CA: CPT

## 2019-07-18 DIAGNOSIS — I10 ESSENTIAL HYPERTENSION: ICD-10-CM

## 2019-07-18 RX ORDER — ATENOLOL 50 MG/1
TABLET ORAL
Qty: 90 TABLET | Refills: 2 | Status: SHIPPED | OUTPATIENT
Start: 2019-07-18 | End: 2020-01-27 | Stop reason: SDUPTHER

## 2019-07-18 NOTE — TELEPHONE ENCOUNTER
Patient called for refills on his Atenolol 50 mg 1 tab daily with 2 refills called into [facility]

## 2019-08-01 ENCOUNTER — OFFICE VISIT (OUTPATIENT)
Dept: FAMILY MEDICINE CLINIC | Facility: CLINIC | Age: 69
End: 2019-08-01
Payer: MEDICARE

## 2019-08-01 VITALS
BODY MASS INDEX: 29.98 KG/M2 | DIASTOLIC BLOOD PRESSURE: 66 MMHG | HEART RATE: 80 BPM | TEMPERATURE: 98.7 F | WEIGHT: 191 LBS | SYSTOLIC BLOOD PRESSURE: 138 MMHG | OXYGEN SATURATION: 98 % | RESPIRATION RATE: 18 BRPM | HEIGHT: 67 IN

## 2019-08-01 DIAGNOSIS — E55.9 VITAMIN D DEFICIENCY: ICD-10-CM

## 2019-08-01 DIAGNOSIS — E66.3 OVERWEIGHT (BMI 25.0-29.9): ICD-10-CM

## 2019-08-01 DIAGNOSIS — I10 ESSENTIAL HYPERTENSION: ICD-10-CM

## 2019-08-01 DIAGNOSIS — E78.5 HYPERLIPIDEMIA, UNSPECIFIED HYPERLIPIDEMIA TYPE: ICD-10-CM

## 2019-08-01 DIAGNOSIS — E11.9 TYPE 2 DIABETES MELLITUS WITHOUT COMPLICATION, WITHOUT LONG-TERM CURRENT USE OF INSULIN (HCC): Primary | ICD-10-CM

## 2019-08-01 PROCEDURE — 99214 OFFICE O/P EST MOD 30 MIN: CPT | Performed by: NURSE PRACTITIONER

## 2019-08-01 NOTE — PATIENT INSTRUCTIONS
Take 50 mg Januvia daily  Take 1/2 of the 100mg tablet  Call for any medication refill needs  Complete blood work before next visit  Nothing to eat or drink 8-10 hours prior to test  Can have black coffee or water  Follow up in 3 months

## 2019-08-01 NOTE — PROGRESS NOTES
[facility]        NAME: Karlo Boyd is a 71 y o  male  : 1950    MRN: [id number]  DATE: 2019  TIME: 1:32 PM    Assessment and Plan   Type 2 diabetes mellitus without complication, without long-term current use of insulin ([facility] [address] ) [E11 9]  1  Type 2 diabetes mellitus without complication, without long-term current use of insulin (HCC)  HEMOGLOBIN A1C W/ EAG ESTIMATION   2  Essential hypertension  Comprehensive metabolic panel   3  Hyperlipidemia, unspecified hyperlipidemia type  Lipid panel   4  Vitamin D deficiency  Vitamin D 25 hydroxy   5  Overweight (BMI 25 0-29  9)         BMI Counseling: Body mass index is 29 91 kg/m²  Discussed the patient's BMI with him  The BMI is below average  No BMI follow-up plan is appropriate  Patient is 72 years old and weight reduction/weight gain would further complicate their underlying nutritional deficiency (vitamin or mineral deficiency)  If elevated liver enzymes at next visit will have patient get liver u/s as recommended by dr Sherri Milton previously  Patient Instructions     Patient Instructions   Take 50 mg Januvia daily  Take 1/2 of the 100mg tablet  Call for any medication refill needs  Complete blood work before next visit  Nothing to eat or drink 8-10 hours prior to test  Can have black coffee or water  Follow up in 3 months  Chief Complaint     Chief Complaint   Patient presents with    Follow-up     3 month follow up diabetes          History of Present Illness       Patient here for 3 month follow up visit to establish care  Prior Dr Bernadette Dozier patient  DM-currently taking Januvia 100 mg and Metformin 1000 mg  States he's noticed weight loss since starting Januvia, started taking in 2018  Patient states Dr Bernadette Dozier had ordered a liver ultrasound due to elevated liver enzymes  Recent labs are wnl, will repeat blood work for next follow up and decide at that time if ultrasound is needed       Judi Nunez surgery on eyes previously, currently taking 3 different eye drops  States his eye pressures remain elevated  Needs to get tubes placed to decrease pressure  Is thinking of looking into Dr Drea Abbasi for a second opinion  HTN-well controlled on current medications    Patient had question about using roundup around the house  Asking if there are any health risks  Has brown spot on left arm  States it has gotten bigger, does not itch, no pain  Review of Systems   Review of Systems   Constitutional: Negative for activity change, appetite change, chills, fatigue and fever  HENT: Negative for congestion, ear pain, nosebleeds, rhinorrhea and sore throat  Eyes: Negative for photophobia, pain, redness and visual disturbance  Respiratory: Negative for cough, shortness of breath and wheezing  Cardiovascular: Negative  Negative for chest pain  Gastrointestinal: Negative  Negative for abdominal pain, constipation, diarrhea and vomiting  Endocrine: Negative  Genitourinary: Negative for difficulty urinating, dysuria and flank pain  Musculoskeletal: Negative  Skin: Negative for color change and rash  Brown spot on left forearm  States history of skin tags which he has had removed  Neurological: Negative for dizziness, weakness, numbness and headaches  Hematological: Negative for adenopathy  Psychiatric/Behavioral: Negative for agitation and confusion  The patient is not nervous/anxious          PHQ-9 Depression Screening    PHQ-9:    Frequency of the following problems over the past two weeks:       Little interest or pleasure in doing things:  0 - not at all  Feeling down, depressed, or hopeless:  0 - not at all  PHQ-2 Score:  0        Current Medications       Current Outpatient Medications:     amLODIPine (NORVASC) 10 mg tablet, Take 1 tablet (10 mg total) by mouth daily, Disp: 90 tablet, Rfl: 1    aspirin 81 MG tablet, Take 81 mg by mouth daily, Disp: , Rfl:     atenolol (TENORMIN) 50 mg tablet, take 1 tablet by mouth once daily, Disp: 90 tablet, Rfl: 2    benazepril (LOTENSIN) 20 mg tablet, Take 1 tablet (20 mg total) by mouth daily, Disp: 90 tablet, Rfl: 1    COMBIGAN 0 2-0 5 %, , Disp: , Rfl:     diclofenac sodium (VOLTAREN) 1 %, Apply 2 g topically 2 (two) times a day, Disp: 1 Tube, Rfl: 2    glucose blood (ONE TOUCH ULTRA TEST) test strip, 1 each by Other route daily Use as instructed, Disp: 50 each, Rfl: 3    hydrochlorothiazide (HYDRODIURIL) 25 mg tablet, Take 1 tablet (25 mg total) by mouth daily, Disp: 90 tablet, Rfl: 1    metFORMIN (GLUCOPHAGE) 1000 MG tablet, Take 1 tablet (1,000 mg total) by mouth 2 (two) times a day with meals, Disp: 180 tablet, Rfl: 1    ONETOUCH DELICA LANCETS FINE MISC, by Does not apply route daily, Disp: 50 each, Rfl: 2    RHOPRESSA 0 02 % SOLN, , Disp: , Rfl:     simvastatin (ZOCOR) 20 mg tablet, Take 1 tablet (20 mg total) by mouth daily at bedtime, Disp: 90 tablet, Rfl: 1    sitaGLIPtin (JANUVIA) 100 mg tablet, Take 1 tablet (100 mg total) by mouth daily Take 1/2 tablet by mouth daily (Patient taking differently: Take 50 mg by mouth daily Take 1/2 tablet by mouth daily), Disp: 14 tablet, Rfl: 0    TRAVATAN Z 0 004 % ophthalmic solution, , Disp: , Rfl: 0    travoprost (TRAVATAN Z) 0 004 % ophthalmic solution, Administer 1 drop to both eyes daily at bedtime, Disp: , Rfl:     Current Allergies     Allergies as of 08/01/2019 - Reviewed 08/01/2019   Allergen Reaction Noted    Allopurinol  08/09/2018    Sulfa antibiotics  08/09/2018            The following portions of the patient's history were reviewed and updated as appropriate: allergies, current medications, past family history, past medical history, past social history, past surgical history and problem list      Past Medical History:   Diagnosis Date    Cerebral vascular disease     Diabetes mellitus ([facility] [address] )     Hyperlipidemia     Hypertension        Past Surgical History: Procedure Laterality Date    CATARACT EXTRACTION      GANGLION CYST EXCISION      GLAUCOMA SURGERY Right 2019    SKIN LESION EXCISION         Family History   Problem Relation Age of Onset    Heart disease Mother     Asthma Mother     Emphysema Mother     Hypertension Mother     Diabetes Father          Medications have been verified  Objective   /66   Pulse 80   Temp 98 7 °F (37 1 °C)   Resp 18   Ht 5' 7" (1 702 m)   Wt 86 6 kg (191 lb)   SpO2 98%   BMI 29 91 kg/m²        Physical Exam     Physical Exam   Constitutional: He is oriented to person, place, and time  Vital signs are normal  He appears well-developed and well-nourished  He is cooperative  He does not appear ill  No distress  HENT:   Head: Normocephalic and atraumatic  Nose: Nose normal  No rhinorrhea  Mouth/Throat: Uvula is midline, oropharynx is clear and moist and mucous membranes are normal    Eyes: Conjunctivae and lids are normal    Neck: Trachea normal  No thyroid mass and no thyromegaly present  Cardiovascular: Normal rate, regular rhythm, S1 normal, S2 normal, normal heart sounds and intact distal pulses  Exam reveals no gallop and no friction rub  No murmur heard  Pulmonary/Chest: Effort normal and breath sounds normal  No respiratory distress  He has no decreased breath sounds  He has no wheezes  Abdominal: Soft  Normal appearance and bowel sounds are normal  He exhibits no distension and no mass  There is no tenderness  There is no rebound  Musculoskeletal: Normal range of motion  He exhibits no edema, tenderness or deformity  Neurological: He is alert and oriented to person, place, and time  Gross Neuro intact  Skin: Skin is warm, dry and intact  No rash noted  He is not diaphoretic  No erythema  No pallor  Psychiatric: He has a normal mood and affect   His speech is normal and behavior is normal  Judgment and thought content normal  Cognition and memory are normal

## 2019-08-27 DIAGNOSIS — M19.90 ARTHRITIS: ICD-10-CM

## 2019-11-14 DIAGNOSIS — Z79.4 TYPE 2 DIABETES MELLITUS WITHOUT COMPLICATION, WITH LONG-TERM CURRENT USE OF INSULIN (HCC): ICD-10-CM

## 2019-11-14 DIAGNOSIS — E11.9 TYPE 2 DIABETES MELLITUS WITHOUT COMPLICATION, WITH LONG-TERM CURRENT USE OF INSULIN (HCC): ICD-10-CM

## 2019-11-14 DIAGNOSIS — E11.8 TYPE 2 DIABETES MELLITUS WITH COMPLICATION, WITHOUT LONG-TERM CURRENT USE OF INSULIN (HCC): ICD-10-CM

## 2019-11-22 DIAGNOSIS — E78.5 HYPERLIPIDEMIA, UNSPECIFIED HYPERLIPIDEMIA TYPE: ICD-10-CM

## 2019-11-22 DIAGNOSIS — I10 HYPERTENSION, UNSPECIFIED TYPE: ICD-10-CM

## 2019-11-22 RX ORDER — SIMVASTATIN 20 MG
20 TABLET ORAL
Qty: 90 TABLET | Refills: 1 | Status: SHIPPED | OUTPATIENT
Start: 2019-11-22 | End: 2020-01-01 | Stop reason: SDUPTHER

## 2019-11-22 RX ORDER — HYDROCHLOROTHIAZIDE 25 MG/1
25 TABLET ORAL DAILY
Qty: 90 TABLET | Refills: 1 | Status: SHIPPED | OUTPATIENT
Start: 2019-11-22 | End: 2020-01-01 | Stop reason: SDUPTHER

## 2019-11-22 NOTE — TELEPHONE ENCOUNTER
Patient called requesting refills on his   1) Simvastatin 20mg 1 tab daily by mouth  2)[id number] 1 tab daily by mouth  #90 day supply with 1 refill sent to [address] [facility] [address]

## 2019-11-26 ENCOUNTER — APPOINTMENT (OUTPATIENT)
Dept: LAB | Facility: CLINIC | Age: 69
End: 2019-11-26
Payer: MEDICARE

## 2019-11-26 DIAGNOSIS — I10 ESSENTIAL HYPERTENSION: ICD-10-CM

## 2019-11-26 DIAGNOSIS — E11.9 TYPE 2 DIABETES MELLITUS WITHOUT COMPLICATION, WITHOUT LONG-TERM CURRENT USE OF INSULIN (HCC): ICD-10-CM

## 2019-11-26 DIAGNOSIS — E55.9 VITAMIN D DEFICIENCY: ICD-10-CM

## 2019-11-26 DIAGNOSIS — E78.5 HYPERLIPIDEMIA, UNSPECIFIED HYPERLIPIDEMIA TYPE: ICD-10-CM

## 2019-11-26 LAB
25(OH)D3 SERPL-MCNC: 19.9 NG/ML (ref 30–100)
ALBUMIN SERPL BCP-MCNC: 4.4 G/DL (ref 3.5–5)
ALP SERPL-CCNC: 50 U/L (ref 46–116)
ALT SERPL W P-5'-P-CCNC: 85 U/L (ref 12–78)
ANION GAP SERPL CALCULATED.3IONS-SCNC: 6 MMOL/L (ref 4–13)
AST SERPL W P-5'-P-CCNC: 39 U/L (ref 5–45)
BILIRUB SERPL-MCNC: 1.37 MG/DL (ref 0.2–1)
BUN SERPL-MCNC: 11 MG/DL (ref 5–25)
CALCIUM ALBUM COR SERPL-MCNC: 10.8 MG/DL (ref 8.3–10.1)
CALCIUM SERPL-MCNC: 11.1 MG/DL (ref 8.3–10.1)
CHLORIDE SERPL-SCNC: 106 MMOL/L (ref 100–108)
CHOLEST SERPL-MCNC: 147 MG/DL (ref 50–200)
CO2 SERPL-SCNC: 31 MMOL/L (ref 21–32)
CREAT SERPL-MCNC: 0.75 MG/DL (ref 0.6–1.3)
EST. AVERAGE GLUCOSE BLD GHB EST-MCNC: 126 MG/DL
GFR SERPL CREATININE-BSD FRML MDRD: 94 ML/MIN/1.73SQ M
GLUCOSE P FAST SERPL-MCNC: 140 MG/DL (ref 65–99)
HBA1C MFR BLD: 6 % (ref 4.2–6.3)
HDLC SERPL-MCNC: 42 MG/DL
LDLC SERPL CALC-MCNC: 55 MG/DL (ref 0–100)
NONHDLC SERPL-MCNC: 105 MG/DL
POTASSIUM SERPL-SCNC: 4.2 MMOL/L (ref 3.5–5.3)
PROT SERPL-MCNC: 7.2 G/DL (ref 6.4–8.2)
SODIUM SERPL-SCNC: 143 MMOL/L (ref 136–145)
TRIGL SERPL-MCNC: 250 MG/DL

## 2019-11-26 PROCEDURE — 36415 COLL VENOUS BLD VENIPUNCTURE: CPT

## 2019-11-26 PROCEDURE — 80061 LIPID PANEL: CPT

## 2019-11-26 PROCEDURE — 83036 HEMOGLOBIN GLYCOSYLATED A1C: CPT

## 2019-11-26 PROCEDURE — 80053 COMPREHEN METABOLIC PANEL: CPT

## 2019-11-26 PROCEDURE — 82306 VITAMIN D 25 HYDROXY: CPT

## 2019-12-03 ENCOUNTER — OFFICE VISIT (OUTPATIENT)
Dept: FAMILY MEDICINE CLINIC | Facility: CLINIC | Age: 69
End: 2019-12-03
Payer: MEDICARE

## 2019-12-03 VITALS
SYSTOLIC BLOOD PRESSURE: 138 MMHG | RESPIRATION RATE: 18 BRPM | TEMPERATURE: 99.4 F | WEIGHT: 191 LBS | BODY MASS INDEX: 29.98 KG/M2 | OXYGEN SATURATION: 99 % | DIASTOLIC BLOOD PRESSURE: 84 MMHG | HEIGHT: 67 IN | HEART RATE: 76 BPM

## 2019-12-03 DIAGNOSIS — E55.9 VITAMIN D DEFICIENCY: Primary | ICD-10-CM

## 2019-12-03 DIAGNOSIS — E78.5 HYPERLIPIDEMIA, UNSPECIFIED HYPERLIPIDEMIA TYPE: ICD-10-CM

## 2019-12-03 DIAGNOSIS — E11.9 TYPE 2 DIABETES MELLITUS WITHOUT COMPLICATION, WITHOUT LONG-TERM CURRENT USE OF INSULIN (HCC): ICD-10-CM

## 2019-12-03 DIAGNOSIS — I10 HYPERTENSION, UNSPECIFIED TYPE: ICD-10-CM

## 2019-12-03 PROCEDURE — 99214 OFFICE O/P EST MOD 30 MIN: CPT | Performed by: NURSE PRACTITIONER

## 2019-12-03 RX ORDER — ERGOCALCIFEROL 1.25 MG/1
50000 CAPSULE ORAL WEEKLY
Qty: 12 CAPSULE | Refills: 0 | Status: SHIPPED | OUTPATIENT
Start: 2019-12-03 | End: 2020-01-01 | Stop reason: ALTCHOICE

## 2019-12-03 RX ORDER — AMLODIPINE BESYLATE 10 MG/1
10 TABLET ORAL DAILY
Qty: 90 TABLET | Refills: 1 | Status: SHIPPED | OUTPATIENT
Start: 2019-12-03 | End: 2020-01-01 | Stop reason: SDUPTHER

## 2019-12-03 RX ORDER — BENAZEPRIL HYDROCHLORIDE 20 MG/1
20 TABLET ORAL DAILY
Qty: 90 TABLET | Refills: 1 | Status: SHIPPED | OUTPATIENT
Start: 2019-12-03 | End: 2020-01-01 | Stop reason: SDUPTHER

## 2019-12-03 NOTE — PATIENT INSTRUCTIONS
Diabetic diet recommends limiting carbohydrates to 60 grams of carbs a day  Follow up in 4 months  Drink more water

## 2019-12-03 NOTE — PROGRESS NOTES
[facility]        NAME: Everette Wright is a 71 y o  male  : 1950    MRN: [id number]  DATE: December 3, 2019  TIME: 12:54 PM    Assessment and Plan   Vitamin D deficiency [E55 9]  1  Vitamin D deficiency  ergocalciferol (VITAMIN D2) 50,000 units   2  Hypertension, unspecified type  benazepril (LOTENSIN) 20 mg tablet    amLODIPine (NORVASC) 10 mg tablet   3  Type 2 diabetes mellitus without complication, without long-term current use of insulin ([address] )     4  Hyperlipidemia, unspecified hyperlipidemia type       DM- well controlled  Found to be vitamin d deficient at 23, ontiveros tart prescription vit d supplement  Would like to go to a nutritionist or Diabetic educator but does not have transportation  Reports he took the bus today  Patient Instructions     Patient Instructions   Diabetic diet recommends limiting carbohydrates to 60 grams of carbs a day  Follow up in 4 months  Drink more water  Chief Complaint     Chief Complaint   Patient presents with    Follow-up     labs          History of Present Illness       Patient here for follow up visit  History of elevated liver enzymes  Labs reviewed Liver enzymes normal do not need to do liver U/S  HTN- well controlled  Denies HA, dizziness, lightheadedness  DM- well controlled  Taking Januvia, metformin  Reports diabetic diet  Glaucoma- in both eyes, has had surgery previously  Review of Systems   Review of Systems   Constitutional: Negative for activity change, appetite change, chills, fatigue and fever  HENT: Negative for congestion, ear pain, nosebleeds, rhinorrhea and sore throat  Eyes: Negative for photophobia, pain, redness and visual disturbance  Respiratory: Negative for cough, shortness of breath and wheezing  Cardiovascular: Negative  Negative for chest pain  Gastrointestinal: Negative  Negative for abdominal pain, constipation, diarrhea and vomiting     Endocrine: Negative  Genitourinary: Negative for difficulty urinating, dysuria and flank pain  Musculoskeletal: Negative  Skin: Negative for color change and rash  Neurological: Negative for dizziness, weakness, numbness and headaches  Hematological: Negative for adenopathy  Psychiatric/Behavioral: Negative for agitation and confusion  The patient is not nervous/anxious          PHQ-9 Depression Screening    PHQ-9:    Frequency of the following problems over the past two weeks:       Little interest or pleasure in doing things:  0 - not at all  Feeling down, depressed, or hopeless:  0 - not at all  PHQ-2 Score:  0        Current Medications       Current Outpatient Medications:     amLODIPine (NORVASC) 10 mg tablet, Take 1 tablet (10 mg total) by mouth daily, Disp: 90 tablet, Rfl: 1    aspirin 81 MG tablet, Take 81 mg by mouth daily, Disp: , Rfl:     atenolol (TENORMIN) 50 mg tablet, take 1 tablet by mouth once daily, Disp: 90 tablet, Rfl: 2    benazepril (LOTENSIN) 20 mg tablet, Take 1 tablet (20 mg total) by mouth daily, Disp: 90 tablet, Rfl: 1    COMBIGAN 0 2-0 5 %, , Disp: , Rfl:     diclofenac sodium (VOLTAREN) 1 %, apply 2 grams to affected area twice a day, Disp: 100 g, Rfl: 2    glucose blood (ONE TOUCH ULTRA TEST) test strip, 1 each by Other route daily Use as instructed, Disp: 50 each, Rfl: 3    hydrochlorothiazide (HYDRODIURIL) 25 mg tablet, Take 1 tablet (25 mg total) by mouth daily, Disp: 90 tablet, Rfl: 1    metFORMIN (GLUCOPHAGE) 1000 MG tablet, Take 1 tablet (1,000 mg total) by mouth 2 (two) times a day with meals, Disp: 180 tablet, Rfl: 1    ONETOUCH DELICA LANCETS FINE MISC, by Does not apply route daily, Disp: 50 each, Rfl: 2    RHOPRESSA 0 02 % SOLN, , Disp: , Rfl:     simvastatin (ZOCOR) 20 mg tablet, Take 1 tablet (20 mg total) by mouth daily at bedtime, Disp: 90 tablet, Rfl: 1    sitaGLIPtin (JANUVIA) 50 mg tablet, Take 1 tablet (50 mg total) by mouth daily Take 1/2 tablet by mouth daily, Disp: 30 tablet, Rfl: 3    TRAVATAN Z 0 004 % ophthalmic solution, , Disp: , Rfl: 0    travoprost (TRAVATAN Z) 0 004 % ophthalmic solution, Administer 1 drop to both eyes daily at bedtime, Disp: , Rfl:     ergocalciferol (VITAMIN D2) 50,000 units, Take 1 capsule (50,000 Units total) by mouth once a week, Disp: 12 capsule, Rfl: 0    Current Allergies     Allergies as of 12/03/2019 - Reviewed 12/03/2019   Allergen Reaction Noted    Allopurinol  08/09/2018    Sulfa antibiotics  08/09/2018            The following portions of the patient's history were reviewed and updated as appropriate: allergies, current medications, past family history, past medical history, past social history, past surgical history and problem list      Past Medical History:   Diagnosis Date    Cerebral vascular disease     Diabetes mellitus ([address] )     Hyperlipidemia     Hypertension        Past Surgical History:   Procedure Laterality Date    CATARACT EXTRACTION      GANGLION CYST EXCISION      GLAUCOMA SURGERY Right 2019    SKIN LESION EXCISION         Family History   Problem Relation Age of Onset    Heart disease Mother     Asthma Mother     Emphysema Mother     Hypertension Mother     Diabetes Father          Medications have been verified  Objective   /84   Pulse 76   Temp 99 4 °F (37 4 °C)   Resp 18   Ht 5' 7" (1 702 m)   Wt 86 6 kg (191 lb)   SpO2 99%   BMI 29 91 kg/m²        Physical Exam     Physical Exam   Constitutional: He is oriented to person, place, and time  He appears well-developed and well-nourished  He is cooperative  He does not appear ill  No distress  Eyes: Lids are normal    Cardiovascular: Normal rate, regular rhythm, S1 normal, S2 normal, normal heart sounds and intact distal pulses  Exam reveals no gallop and no friction rub  No murmur heard  Pulmonary/Chest: Effort normal and breath sounds normal  No respiratory distress  He has no decreased breath sounds   He has no wheezes  Abdominal: Normal appearance  Musculoskeletal: Normal range of motion  He exhibits no edema, tenderness or deformity  Neurological: He is alert and oriented to person, place, and time  Skin: Skin is warm  No rash noted  No erythema  Psychiatric: He has a normal mood and affect  His behavior is normal  Thought content normal    Nursing note and vitals reviewed

## 2019-12-18 ENCOUNTER — TRANSCRIBE ORDERS (OUTPATIENT)
Dept: ADMINISTRATIVE | Facility: HOSPITAL | Age: 69
End: 2019-12-18

## 2019-12-18 DIAGNOSIS — R79.89 ELEVATED LFTS: Primary | ICD-10-CM

## 2020-01-01 ENCOUNTER — APPOINTMENT (OUTPATIENT)
Dept: RADIATION ONCOLOGY | Facility: CLINIC | Age: 70
End: 2020-01-01
Payer: MEDICARE

## 2020-01-01 ENCOUNTER — OFFICE VISIT (OUTPATIENT)
Dept: HEMATOLOGY ONCOLOGY | Facility: CLINIC | Age: 70
End: 2020-01-01
Payer: MEDICARE

## 2020-01-01 ENCOUNTER — LAB (OUTPATIENT)
Dept: LAB | Facility: CLINIC | Age: 70
End: 2020-01-01
Payer: MEDICARE

## 2020-01-01 ENCOUNTER — TELEPHONE (OUTPATIENT)
Dept: HEMATOLOGY ONCOLOGY | Facility: MEDICAL CENTER | Age: 70
End: 2020-01-01

## 2020-01-01 ENCOUNTER — APPOINTMENT (OUTPATIENT)
Dept: RADIATION ONCOLOGY | Facility: CLINIC | Age: 70
End: 2020-01-01
Attending: RADIOLOGY
Payer: MEDICARE

## 2020-01-01 ENCOUNTER — LAB (OUTPATIENT)
Dept: LAB | Facility: HOSPITAL | Age: 70
End: 2020-01-01
Payer: MEDICARE

## 2020-01-01 ENCOUNTER — HOSPITAL ENCOUNTER (OUTPATIENT)
Dept: ULTRASOUND IMAGING | Facility: HOSPITAL | Age: 70
Discharge: HOME | End: 2020-12-02
Payer: MEDICARE

## 2020-01-01 ENCOUNTER — TELEPHONE (OUTPATIENT)
Dept: HEMATOLOGY ONCOLOGY | Facility: CLINIC | Age: 70
End: 2020-01-01

## 2020-01-01 ENCOUNTER — OFFICE VISIT (OUTPATIENT)
Dept: OBGYN CLINIC | Facility: CLINIC | Age: 70
End: 2020-01-01
Payer: MEDICARE

## 2020-01-01 ENCOUNTER — PROCEDURE VISIT (OUTPATIENT)
Dept: SURGERY | Facility: CLINIC | Age: 70
End: 2020-01-01
Payer: MEDICARE

## 2020-01-01 ENCOUNTER — TELEPHONE (OUTPATIENT)
Dept: FAMILY MEDICINE CLINIC | Facility: CLINIC | Age: 70
End: 2020-01-01

## 2020-01-01 ENCOUNTER — DOCUMENTATION (OUTPATIENT)
Dept: INFUSION CENTER | Facility: CLINIC | Age: 70
End: 2020-01-01

## 2020-01-01 ENCOUNTER — APPOINTMENT (OUTPATIENT)
Dept: LAB | Facility: HOSPITAL | Age: 70
End: 2020-01-01
Payer: MEDICARE

## 2020-01-01 ENCOUNTER — TELEPHONE (OUTPATIENT)
Dept: OBGYN CLINIC | Facility: HOSPITAL | Age: 70
End: 2020-01-01

## 2020-01-01 ENCOUNTER — APPOINTMENT (OUTPATIENT)
Dept: LAB | Facility: CLINIC | Age: 70
End: 2020-01-01
Attending: RADIOLOGY
Payer: MEDICARE

## 2020-01-01 ENCOUNTER — HOSPITAL ENCOUNTER (OUTPATIENT)
Dept: NUCLEAR MEDICINE | Facility: HOSPITAL | Age: 70
Discharge: HOME | End: 2020-08-04
Attending: INTERNAL MEDICINE
Payer: MEDICARE

## 2020-01-01 ENCOUNTER — OFFICE VISIT (OUTPATIENT)
Dept: FAMILY MEDICINE CLINIC | Facility: CLINIC | Age: 70
End: 2020-01-01
Payer: MEDICARE

## 2020-01-01 ENCOUNTER — CONSULT (OUTPATIENT)
Dept: HEMATOLOGY ONCOLOGY | Facility: CLINIC | Age: 70
End: 2020-01-01
Payer: MEDICARE

## 2020-01-01 ENCOUNTER — APPOINTMENT (OUTPATIENT)
Dept: LAB | Facility: CLINIC | Age: 70
End: 2020-01-01
Payer: MEDICARE

## 2020-01-01 ENCOUNTER — OFFICE VISIT (OUTPATIENT)
Dept: SURGERY | Facility: CLINIC | Age: 70
End: 2020-01-01

## 2020-01-01 ENCOUNTER — OFFICE VISIT (OUTPATIENT)
Dept: SURGERY | Facility: CLINIC | Age: 70
End: 2020-01-01
Payer: MEDICARE

## 2020-01-01 ENCOUNTER — APPOINTMENT (OUTPATIENT)
Dept: RADIATION ONCOLOGY | Facility: HOSPITAL | Age: 70
End: 2020-01-01
Attending: RADIOLOGY
Payer: MEDICARE

## 2020-01-01 ENCOUNTER — TELEPHONE (OUTPATIENT)
Dept: SURGICAL ONCOLOGY | Facility: CLINIC | Age: 70
End: 2020-01-01

## 2020-01-01 ENCOUNTER — TELEPHONE (OUTPATIENT)
Dept: SURGERY | Facility: CLINIC | Age: 70
End: 2020-01-01

## 2020-01-01 ENCOUNTER — TRANSCRIBE ORDERS (OUTPATIENT)
Dept: LAB | Facility: HOSPITAL | Age: 70
End: 2020-01-01

## 2020-01-01 VITALS
TEMPERATURE: 97.3 F | BODY MASS INDEX: 29.87 KG/M2 | HEIGHT: 67 IN | WEIGHT: 190.3 LBS | SYSTOLIC BLOOD PRESSURE: 160 MMHG | HEART RATE: 71 BPM | DIASTOLIC BLOOD PRESSURE: 78 MMHG | RESPIRATION RATE: 16 BRPM | OXYGEN SATURATION: 98 %

## 2020-01-01 VITALS
HEIGHT: 67 IN | BODY MASS INDEX: 30.04 KG/M2 | DIASTOLIC BLOOD PRESSURE: 72 MMHG | RESPIRATION RATE: 16 BRPM | WEIGHT: 191.4 LBS | OXYGEN SATURATION: 97 % | SYSTOLIC BLOOD PRESSURE: 130 MMHG | HEART RATE: 78 BPM | TEMPERATURE: 97.4 F

## 2020-01-01 VITALS
DIASTOLIC BLOOD PRESSURE: 60 MMHG | SYSTOLIC BLOOD PRESSURE: 132 MMHG | OXYGEN SATURATION: 99 % | TEMPERATURE: 97.4 F | RESPIRATION RATE: 20 BRPM | BODY MASS INDEX: 30.45 KG/M2 | HEART RATE: 86 BPM | HEIGHT: 67 IN | WEIGHT: 194 LBS

## 2020-01-01 VITALS
BODY MASS INDEX: 30.39 KG/M2 | SYSTOLIC BLOOD PRESSURE: 162 MMHG | HEART RATE: 77 BPM | TEMPERATURE: 98.3 F | HEIGHT: 67 IN | DIASTOLIC BLOOD PRESSURE: 78 MMHG | RESPIRATION RATE: 16 BRPM | OXYGEN SATURATION: 98 % | WEIGHT: 193.6 LBS

## 2020-01-01 VITALS
RESPIRATION RATE: 14 BRPM | HEIGHT: 67 IN | SYSTOLIC BLOOD PRESSURE: 136 MMHG | DIASTOLIC BLOOD PRESSURE: 76 MMHG | TEMPERATURE: 97.8 F | WEIGHT: 193 LBS | BODY MASS INDEX: 30.29 KG/M2

## 2020-01-01 VITALS
HEIGHT: 67 IN | BODY MASS INDEX: 30.55 KG/M2 | DIASTOLIC BLOOD PRESSURE: 70 MMHG | OXYGEN SATURATION: 98 % | WEIGHT: 194.67 LBS | HEART RATE: 75 BPM | TEMPERATURE: 97.7 F | SYSTOLIC BLOOD PRESSURE: 150 MMHG | RESPIRATION RATE: 16 BRPM

## 2020-01-01 VITALS
WEIGHT: 188.49 LBS | HEIGHT: 67 IN | RESPIRATION RATE: 18 BRPM | BODY MASS INDEX: 29.58 KG/M2 | OXYGEN SATURATION: 98 % | HEART RATE: 81 BPM | DIASTOLIC BLOOD PRESSURE: 62 MMHG | SYSTOLIC BLOOD PRESSURE: 130 MMHG | TEMPERATURE: 98.1 F

## 2020-01-01 VITALS
HEIGHT: 67 IN | BODY MASS INDEX: 29.85 KG/M2 | SYSTOLIC BLOOD PRESSURE: 126 MMHG | HEART RATE: 76 BPM | WEIGHT: 190.2 LBS | RESPIRATION RATE: 20 BRPM | DIASTOLIC BLOOD PRESSURE: 70 MMHG | OXYGEN SATURATION: 99 % | TEMPERATURE: 98.8 F

## 2020-01-01 VITALS — HEIGHT: 67 IN | BODY MASS INDEX: 30.45 KG/M2 | TEMPERATURE: 97.8 F | WEIGHT: 194 LBS

## 2020-01-01 VITALS — TEMPERATURE: 97.8 F

## 2020-01-01 VITALS — TEMPERATURE: 97.4 F

## 2020-01-01 DIAGNOSIS — I10 HYPERTENSION, UNSPECIFIED TYPE: ICD-10-CM

## 2020-01-01 DIAGNOSIS — E11.9 TYPE 2 DIABETES MELLITUS WITHOUT COMPLICATION, WITH LONG-TERM CURRENT USE OF INSULIN (HCC): ICD-10-CM

## 2020-01-01 DIAGNOSIS — C83.01 SMALL CELL B-CELL LYMPHOMA, LYMPH NODES OF HEAD, FACE, AND NECK (HCC): ICD-10-CM

## 2020-01-01 DIAGNOSIS — Z12.5 SCREENING FOR PROSTATE CANCER: Primary | ICD-10-CM

## 2020-01-01 DIAGNOSIS — E78.5 HYPERLIPIDEMIA, UNSPECIFIED HYPERLIPIDEMIA TYPE: ICD-10-CM

## 2020-01-01 DIAGNOSIS — K76.0 HEPATIC STEATOSIS: ICD-10-CM

## 2020-01-01 DIAGNOSIS — Z79.4 DIABETES MELLITUS DUE TO UNDERLYING CONDITION WITH STAGE 4 CHRONIC KIDNEY DISEASE, WITH LONG-TERM CURRENT USE OF INSULIN (HCC): ICD-10-CM

## 2020-01-01 DIAGNOSIS — E83.52 HYPERCALCEMIA: ICD-10-CM

## 2020-01-01 DIAGNOSIS — Z12.5 SCREENING FOR PROSTATE CANCER: ICD-10-CM

## 2020-01-01 DIAGNOSIS — Z79.4 TYPE 2 DIABETES MELLITUS WITHOUT COMPLICATION, WITH LONG-TERM CURRENT USE OF INSULIN (HCC): ICD-10-CM

## 2020-01-01 DIAGNOSIS — E79.0 HYPERURICEMIA: ICD-10-CM

## 2020-01-01 DIAGNOSIS — C83.01 SMALL B-CELL LYMPHOMA OF LYMPH NODES OF HEAD (HCC): Primary | ICD-10-CM

## 2020-01-01 DIAGNOSIS — C83.01 SMALL CELL B-CELL LYMPHOMA, LYMPH NODES OF HEAD, FACE, AND NECK (HCC): Primary | ICD-10-CM

## 2020-01-01 DIAGNOSIS — D69.6 THROMBOCYTOPENIA (HCC): ICD-10-CM

## 2020-01-01 DIAGNOSIS — R16.0 HEPATOMEGALY: ICD-10-CM

## 2020-01-01 DIAGNOSIS — I10 ESSENTIAL HYPERTENSION: ICD-10-CM

## 2020-01-01 DIAGNOSIS — C83.01 SMALL B-CELL LYMPHOMA OF LYMPH NODES OF HEAD (HCC): ICD-10-CM

## 2020-01-01 DIAGNOSIS — E83.52 SERUM CALCIUM ELEVATED: ICD-10-CM

## 2020-01-01 DIAGNOSIS — D48.5 NEOPLASM OF UNCERTAIN BEHAVIOR OF SKIN OF FACE: Primary | ICD-10-CM

## 2020-01-01 DIAGNOSIS — E61.2 MAGNESIUM DEFICIENCY: Primary | ICD-10-CM

## 2020-01-01 DIAGNOSIS — E11.8 TYPE 2 DIABETES MELLITUS WITH COMPLICATION, WITHOUT LONG-TERM CURRENT USE OF INSULIN (HCC): ICD-10-CM

## 2020-01-01 DIAGNOSIS — E83.52 HYPERCALCEMIA: Primary | ICD-10-CM

## 2020-01-01 DIAGNOSIS — E11.9 ENCOUNTER FOR DIABETIC FOOT EXAM (HCC): ICD-10-CM

## 2020-01-01 DIAGNOSIS — E83.52 SERUM CALCIUM ELEVATED: Primary | ICD-10-CM

## 2020-01-01 DIAGNOSIS — N18.30 TYPE 2 DIABETES MELLITUS WITH STAGE 3 CHRONIC KIDNEY DISEASE, WITHOUT LONG-TERM CURRENT USE OF INSULIN, UNSPECIFIED WHETHER STAGE 3A OR 3B CKD (HCC): Primary | ICD-10-CM

## 2020-01-01 DIAGNOSIS — Z00.00 ENCOUNTER FOR MEDICARE ANNUAL WELLNESS EXAM: ICD-10-CM

## 2020-01-01 DIAGNOSIS — M70.61 TROCHANTERIC BURSITIS OF RIGHT HIP: Primary | ICD-10-CM

## 2020-01-01 DIAGNOSIS — E08.22 DIABETES MELLITUS DUE TO UNDERLYING CONDITION WITH STAGE 4 CHRONIC KIDNEY DISEASE, WITH LONG-TERM CURRENT USE OF INSULIN (HCC): ICD-10-CM

## 2020-01-01 DIAGNOSIS — N18.4 DIABETES MELLITUS DUE TO UNDERLYING CONDITION WITH STAGE 4 CHRONIC KIDNEY DISEASE, WITH LONG-TERM CURRENT USE OF INSULIN (HCC): ICD-10-CM

## 2020-01-01 DIAGNOSIS — D48.5 NEOPLASM OF UNCERTAIN BEHAVIOR OF SKIN OF FACE: ICD-10-CM

## 2020-01-01 DIAGNOSIS — L98.9 SKIN LESION OF FACE: Primary | ICD-10-CM

## 2020-01-01 DIAGNOSIS — E11.22 TYPE 2 DIABETES MELLITUS WITH STAGE 3 CHRONIC KIDNEY DISEASE, WITHOUT LONG-TERM CURRENT USE OF INSULIN, UNSPECIFIED WHETHER STAGE 3A OR 3B CKD (HCC): Primary | ICD-10-CM

## 2020-01-01 DIAGNOSIS — M16.11 ARTHRITIS OF RIGHT HIP: ICD-10-CM

## 2020-01-01 DIAGNOSIS — L98.9 SKIN LESION OF FACE: ICD-10-CM

## 2020-01-01 LAB
ALBUMIN SERPL BCP-MCNC: 3.9 G/DL (ref 3.5–5)
ALBUMIN SERPL BCP-MCNC: 4 G/DL (ref 3.5–5)
ALBUMIN SERPL BCP-MCNC: 4.1 G/DL (ref 3.5–5)
ALBUMIN SERPL ELPH-MCNC: 4.33 G/DL (ref 3.5–5)
ALBUMIN SERPL ELPH-MCNC: 61 % (ref 52–65)
ALP SERPL-CCNC: 44 U/L (ref 46–116)
ALP SERPL-CCNC: 46 U/L (ref 46–116)
ALP SERPL-CCNC: 47 U/L (ref 46–116)
ALPHA1 GLOB SERPL ELPH-MCNC: 0.29 G/DL (ref 0.1–0.4)
ALPHA1 GLOB SERPL ELPH-MCNC: 4.1 % (ref 2.5–5)
ALPHA2 GLOB SERPL ELPH-MCNC: 0.82 G/DL (ref 0.4–1.2)
ALPHA2 GLOB SERPL ELPH-MCNC: 11.5 % (ref 7–13)
ALT SERPL W P-5'-P-CCNC: 68 U/L (ref 12–78)
ALT SERPL W P-5'-P-CCNC: 70 U/L (ref 12–78)
ALT SERPL W P-5'-P-CCNC: 79 U/L (ref 12–78)
ANION GAP SERPL CALCULATED.3IONS-SCNC: 1 MMOL/L (ref 4–13)
ANION GAP SERPL CALCULATED.3IONS-SCNC: 4 MMOL/L (ref 4–13)
ANION GAP SERPL CALCULATED.3IONS-SCNC: 5 MMOL/L (ref 4–13)
ANION GAP SERPL CALCULATED.3IONS-SCNC: 7 MMOL/L (ref 4–13)
AST SERPL W P-5'-P-CCNC: 39 U/L (ref 5–45)
AST SERPL W P-5'-P-CCNC: 40 U/L (ref 5–45)
AST SERPL W P-5'-P-CCNC: 42 U/L (ref 5–45)
B2 MICROGLOB SERPL-MCNC: 1.9 MG/L (ref 0.6–2.4)
BASOPHILS # BLD AUTO: 0.08 THOUSANDS/ΜL (ref 0–0.1)
BASOPHILS # BLD AUTO: 0.1 THOUSANDS/ΜL (ref 0–0.1)
BASOPHILS # BLD MANUAL: 0 THOUSAND/UL (ref 0–0.1)
BASOPHILS NFR BLD AUTO: 1 % (ref 0–1)
BASOPHILS NFR BLD AUTO: 1 % (ref 0–1)
BASOPHILS NFR MAR MANUAL: 0 % (ref 0–1)
BETA GLOB ABNORMAL SERPL ELPH-MCNC: 0.43 G/DL (ref 0.4–0.8)
BETA1 GLOB SERPL ELPH-MCNC: 6 % (ref 5–13)
BETA2 GLOB SERPL ELPH-MCNC: 4.5 % (ref 2–8)
BETA2+GAMMA GLOB SERPL ELPH-MCNC: 0.32 G/DL (ref 0.2–0.5)
BILIRUB SERPL-MCNC: 1.27 MG/DL (ref 0.2–1)
BILIRUB SERPL-MCNC: 1.48 MG/DL (ref 0.2–1)
BILIRUB SERPL-MCNC: 1.51 MG/DL (ref 0.2–1)
BUN SERPL-MCNC: 10 MG/DL (ref 5–25)
BUN SERPL-MCNC: 10 MG/DL (ref 5–25)
BUN SERPL-MCNC: 10 MG/DL (ref 7–25)
BUN SERPL-MCNC: 13 MG/DL (ref 5–25)
CALCIUM ALBUM COR SERPL-MCNC: 10.7 MG/DL (ref 8.3–10.1)
CALCIUM SERPL-MCNC: 10.6 MG/DL (ref 8.3–10.1)
CALCIUM SERPL-MCNC: 10.6 MG/DL (ref 8.3–10.1)
CALCIUM SERPL-MCNC: 10.9 MG/DL (ref 8.6–10.5)
CALCIUM SERPL-MCNC: 11.3 MG/DL (ref 8.3–10.1)
CHLORIDE SERPL-SCNC: 100 MMOL/L (ref 98–107)
CHLORIDE SERPL-SCNC: 103 MMOL/L (ref 100–108)
CHLORIDE SERPL-SCNC: 105 MMOL/L (ref 100–108)
CHLORIDE SERPL-SCNC: 105 MMOL/L (ref 100–108)
CHOLEST SERPL-MCNC: 151 MG/DL (ref 50–200)
CO2 SERPL-SCNC: 29 MMOL/L (ref 21–32)
CO2 SERPL-SCNC: 30 MMOL/L (ref 21–32)
CO2 SERPL-SCNC: 31 MMOL/L (ref 21–31)
CO2 SERPL-SCNC: 33 MMOL/L (ref 21–32)
CREAT SERPL-MCNC: 0.61 MG/DL (ref 0.7–1.3)
CREAT SERPL-MCNC: 0.67 MG/DL (ref 0.6–1.3)
CREAT SERPL-MCNC: 0.69 MG/DL (ref 0.6–1.3)
CREAT SERPL-MCNC: 0.73 MG/DL (ref 0.6–1.3)
CRP SERPL QL: <3 MG/L
EOSINOPHIL # BLD AUTO: 0.17 THOUSAND/ΜL (ref 0–0.61)
EOSINOPHIL # BLD AUTO: 0.2 THOUSAND/ΜL (ref 0–0.61)
EOSINOPHIL # BLD MANUAL: 0 THOUSAND/UL (ref 0–0.4)
EOSINOPHIL NFR BLD AUTO: 2 % (ref 0–6)
EOSINOPHIL NFR BLD AUTO: 2 % (ref 0–6)
EOSINOPHIL NFR BLD MANUAL: 0 % (ref 0–6)
ERYTHROCYTE [DISTWIDTH] IN BLOOD BY AUTOMATED COUNT: 12.5 % (ref 11.6–15.1)
ERYTHROCYTE [DISTWIDTH] IN BLOOD BY AUTOMATED COUNT: 12.6 % (ref 11.6–15.1)
ERYTHROCYTE [DISTWIDTH] IN BLOOD BY AUTOMATED COUNT: 13.1 % (ref 11.6–15.1)
ERYTHROCYTE [SEDIMENTATION RATE] IN BLOOD: 11 MM/HOUR (ref 0–19)
EST. AVERAGE GLUCOSE BLD GHB EST-MCNC: 120 MG/DL
FERRITIN SERPL-MCNC: 132 NG/ML (ref 8–388)
GAMMA GLOB ABNORMAL SERPL ELPH-MCNC: 0.92 G/DL (ref 0.5–1.6)
GAMMA GLOB SERPL ELPH-MCNC: 12.9 % (ref 12–22)
GFR SERPL CREATININE-BSD FRML MDRD: 101 ML/MIN/1.73SQ M
GFR SERPL CREATININE-BSD FRML MDRD: 94 ML/MIN/1.73SQ M
GFR SERPL CREATININE-BSD FRML MDRD: 96 ML/MIN/1.73SQ M
GFR SERPL CREATININE-BSD FRML MDRD: 97 ML/MIN/1.73SQ M
GLUCOSE P FAST SERPL-MCNC: 125 MG/DL (ref 65–99)
GLUCOSE P FAST SERPL-MCNC: 127 MG/DL (ref 65–99)
GLUCOSE P FAST SERPL-MCNC: 94 MG/DL (ref 65–99)
GLUCOSE SERPL-MCNC: 102 MG/DL (ref 65–140)
GLUCOSE SERPL-MCNC: 128 MG/DL (ref 65–140)
HBA1C MFR BLD: 5.8 %
HBV CORE AB SER QL: NORMAL
HBV CORE IGM SER QL: NORMAL
HBV SURFACE AG SER QL: NORMAL
HCT VFR BLD AUTO: 41 % (ref 36.5–49.3)
HCT VFR BLD AUTO: 43 % (ref 36.5–49.3)
HCT VFR BLD AUTO: 44.9 % (ref 36.5–49.3)
HCV AB SER QL: NORMAL
HDLC SERPL-MCNC: 56 MG/DL
HGB BLD-MCNC: 14.1 G/DL (ref 12–17)
HGB BLD-MCNC: 14.6 G/DL (ref 12–17)
HGB BLD-MCNC: 14.7 G/DL (ref 12–17)
IGA SERPL-MCNC: 122 MG/DL (ref 70–400)
IGG SERPL-MCNC: 877 MG/DL (ref 700–1600)
IGG/ALB SER: 1.56 {RATIO} (ref 1.1–1.8)
IGM SERPL-MCNC: 80 MG/DL (ref 40–230)
IMM GRANULOCYTES # BLD AUTO: 0.02 THOUSAND/UL (ref 0–0.2)
IMM GRANULOCYTES # BLD AUTO: 0.04 THOUSAND/UL (ref 0–0.2)
IMM GRANULOCYTES NFR BLD AUTO: 0 % (ref 0–2)
IMM GRANULOCYTES NFR BLD AUTO: 0 % (ref 0–2)
IRON SATN MFR SERPL: 32 %
IRON SERPL-MCNC: 99 UG/DL (ref 65–175)
KAPPA LC FREE SER-MCNC: 18.9 MG/L (ref 3.3–19.4)
KAPPA LC FREE/LAMBDA FREE SER: 1.52 {RATIO} (ref 0.26–1.65)
LAMBDA LC FREE SERPL-MCNC: 12.4 MG/L (ref 5.7–26.3)
LDH SERPL-CCNC: 211 U/L (ref 81–234)
LDH SERPL-CCNC: 230 U/L (ref 81–234)
LDLC SERPL CALC-MCNC: 56 MG/DL (ref 0–100)
LG PLATELETS BLD QL SMEAR: PRESENT
LYMPHOCYTES # BLD AUTO: 3.03 THOUSANDS/ΜL (ref 0.6–4.47)
LYMPHOCYTES # BLD AUTO: 3.32 THOUSANDS/ΜL (ref 0.6–4.47)
LYMPHOCYTES # BLD AUTO: 3.52 THOUSAND/UL (ref 0.6–4.47)
LYMPHOCYTES # BLD AUTO: 38 % (ref 14–44)
LYMPHOCYTES NFR BLD AUTO: 34 % (ref 14–44)
LYMPHOCYTES NFR BLD AUTO: 34 % (ref 14–44)
MAGNESIUM SERPL-MCNC: 1.6 MG/DL (ref 1.9–2.7)
MCH RBC QN AUTO: 29.1 PG (ref 26.8–34.3)
MCH RBC QN AUTO: 29.4 PG (ref 26.8–34.3)
MCH RBC QN AUTO: 29.7 PG (ref 26.8–34.3)
MCHC RBC AUTO-ENTMCNC: 32.7 G/DL (ref 31.4–37.4)
MCHC RBC AUTO-ENTMCNC: 34 G/DL (ref 31.4–37.4)
MCHC RBC AUTO-ENTMCNC: 34.4 G/DL (ref 31.4–37.4)
MCV RBC AUTO: 86 FL (ref 82–98)
MCV RBC AUTO: 87 FL (ref 82–98)
MCV RBC AUTO: 89 FL (ref 82–98)
MONOCYTES # BLD AUTO: 0.65 THOUSAND/UL (ref 0–1.22)
MONOCYTES # BLD AUTO: 0.71 THOUSAND/ΜL (ref 0.17–1.22)
MONOCYTES # BLD AUTO: 0.84 THOUSAND/ΜL (ref 0.17–1.22)
MONOCYTES NFR BLD AUTO: 8 % (ref 4–12)
MONOCYTES NFR BLD AUTO: 9 % (ref 4–12)
MONOCYTES NFR BLD: 7 % (ref 4–12)
NEUTROPHILS # BLD AUTO: 4.91 THOUSANDS/ΜL (ref 1.85–7.62)
NEUTROPHILS # BLD AUTO: 5.37 THOUSANDS/ΜL (ref 1.85–7.62)
NEUTROPHILS # BLD MANUAL: 5.09 THOUSAND/UL (ref 1.85–7.62)
NEUTS BAND NFR BLD MANUAL: 1 % (ref 0–8)
NEUTS SEG NFR BLD AUTO: 54 % (ref 43–75)
NEUTS SEG NFR BLD AUTO: 54 % (ref 43–75)
NEUTS SEG NFR BLD AUTO: 55 % (ref 43–75)
NONHDLC SERPL-MCNC: 95 MG/DL
NRBC BLD AUTO-RTO: 0 /100 WBCS
NRBC BLD AUTO-RTO: 0 /100 WBCS
PLATELET # BLD AUTO: 116 THOUSANDS/UL (ref 149–390)
PLATELET # BLD AUTO: 120 THOUSANDS/UL (ref 149–390)
PLATELET # BLD AUTO: 140 THOUSANDS/UL (ref 149–390)
PLATELET BLD QL SMEAR: ABNORMAL
PMV BLD AUTO: 11.9 FL (ref 8.9–12.7)
PMV BLD AUTO: 12.6 FL (ref 8.9–12.7)
PMV BLD AUTO: 12.7 FL (ref 8.9–12.7)
POTASSIUM SERPL-SCNC: 3.8 MMOL/L (ref 3.5–5.5)
POTASSIUM SERPL-SCNC: 3.9 MMOL/L (ref 3.5–5.3)
POTASSIUM SERPL-SCNC: 3.9 MMOL/L (ref 3.5–5.3)
POTASSIUM SERPL-SCNC: 4 MMOL/L (ref 3.5–5.3)
PROT PATTERN SERPL ELPH-IMP: NORMAL
PROT SERPL-MCNC: 7.1 G/DL (ref 6.4–8.2)
PROT SERPL-MCNC: 7.1 G/DL (ref 6.4–8.2)
PROT SERPL-MCNC: 7.2 G/DL (ref 6.4–8.2)
PROT SERPL-MCNC: 7.3 G/DL (ref 6.4–8.2)
PSA FREE MFR SERPL: 26.7 %
PSA FREE SERPL-MCNC: 0.08 NG/ML
PSA SERPL-MCNC: 0.3 NG/ML (ref 0–4)
PTH-INTACT SERPL-MCNC: 105.3 PG/ML (ref 18.4–80.1)
PTH-INTACT SERPL-MCNC: 120.7 PG/ML (ref 18.4–80.1)
RBC # BLD AUTO: 4.75 MILLION/UL (ref 3.88–5.62)
RBC # BLD AUTO: 4.97 MILLION/UL (ref 3.88–5.62)
RBC # BLD AUTO: 5.06 MILLION/UL (ref 3.88–5.62)
RBC MORPH BLD: NORMAL
SARS-COV-2 RNA SPEC QL NAA+PROBE: NOT DETECTED
SL AMB POCT HEMOGLOBIN AIC: 6 (ref ?–6.5)
SODIUM SERPL-SCNC: 137 MMOL/L (ref 136–145)
SODIUM SERPL-SCNC: 138 MMOL/L (ref 134–143)
SODIUM SERPL-SCNC: 139 MMOL/L (ref 136–145)
SODIUM SERPL-SCNC: 139 MMOL/L (ref 136–145)
TIBC SERPL-MCNC: 312 UG/DL (ref 250–450)
TOTAL CELLS COUNTED SPEC: 100
TRIGL SERPL-MCNC: 193 MG/DL
TSH SERPL DL<=0.05 MIU/L-ACNC: 1.51 UIU/ML (ref 0.45–5.33)
URATE SERPL-MCNC: 8 MG/DL (ref 4.2–8)
URATE SERPL-MCNC: 8.8 MG/DL (ref 4.2–8)
VIT B12 SERPL-MCNC: 601 PG/ML (ref 100–900)
WBC # BLD AUTO: 8.94 THOUSAND/UL (ref 4.31–10.16)
WBC # BLD AUTO: 9.26 THOUSAND/UL (ref 4.31–10.16)
WBC # BLD AUTO: 9.85 THOUSAND/UL (ref 4.31–10.16)

## 2020-01-01 PROCEDURE — 84165 PROTEIN E-PHORESIS SERUM: CPT | Performed by: PATHOLOGY

## 2020-01-01 PROCEDURE — 4040F PNEUMOC VAC/ADMIN/RCVD: CPT | Performed by: NURSE PRACTITIONER

## 2020-01-01 PROCEDURE — 83615 LACTATE (LD) (LDH) ENZYME: CPT

## 2020-01-01 PROCEDURE — 1036F TOBACCO NON-USER: CPT | Performed by: SURGERY

## 2020-01-01 PROCEDURE — G0438 PPPS, INITIAL VISIT: HCPCS | Performed by: NURSE PRACTITIONER

## 2020-01-01 PROCEDURE — 83036 HEMOGLOBIN GLYCOSYLATED A1C: CPT | Performed by: NURSE PRACTITIONER

## 2020-01-01 PROCEDURE — 1036F TOBACCO NON-USER: CPT | Performed by: INTERNAL MEDICINE

## 2020-01-01 PROCEDURE — G0103 PSA SCREENING: HCPCS

## 2020-01-01 PROCEDURE — 3008F BODY MASS INDEX DOCD: CPT | Performed by: SURGERY

## 2020-01-01 PROCEDURE — 77412 RADIATION TX DELIVERY LVL 3: CPT | Performed by: RADIOLOGY

## 2020-01-01 PROCEDURE — 1170F FXNL STATUS ASSESSED: CPT | Performed by: PHYSICIAN ASSISTANT

## 2020-01-01 PROCEDURE — 1170F FXNL STATUS ASSESSED: CPT | Performed by: NURSE PRACTITIONER

## 2020-01-01 PROCEDURE — 11104 PUNCH BX SKIN SINGLE LESION: CPT | Performed by: SURGERY

## 2020-01-01 PROCEDURE — 83036 HEMOGLOBIN GLYCOSYLATED A1C: CPT

## 2020-01-01 PROCEDURE — 88365 INSITU HYBRIDIZATION (FISH): CPT | Performed by: STUDENT IN AN ORGANIZED HEALTH CARE EDUCATION/TRAINING PROGRAM

## 2020-01-01 PROCEDURE — 83970 ASSAY OF PARATHORMONE: CPT

## 2020-01-01 PROCEDURE — 85027 COMPLETE CBC AUTOMATED: CPT

## 2020-01-01 PROCEDURE — 83550 IRON BINDING TEST: CPT

## 2020-01-01 PROCEDURE — 11105 PUNCH BX SKIN EA SEP/ADDL: CPT | Performed by: SURGERY

## 2020-01-01 PROCEDURE — 1170F FXNL STATUS ASSESSED: CPT | Performed by: SURGERY

## 2020-01-01 PROCEDURE — U0003 INFECTIOUS AGENT DETECTION BY NUCLEIC ACID (DNA OR RNA); SEVERE ACUTE RESPIRATORY SYNDROME CORONAVIRUS 2 (SARS-COV-2) (CORONAVIRUS DISEASE [COVID-19]), AMPLIFIED PROBE TECHNIQUE, MAKING USE OF HIGH THROUGHPUT TECHNOLOGIES AS DESCRIBED BY CMS-2020-01-R: HCPCS | Performed by: RADIOLOGY

## 2020-01-01 PROCEDURE — 81261 IGH GENE REARRANGE AMP METH: CPT | Performed by: STUDENT IN AN ORGANIZED HEALTH CARE EDUCATION/TRAINING PROGRAM

## 2020-01-01 PROCEDURE — 99213 OFFICE O/P EST LOW 20 MIN: CPT | Performed by: ORTHOPAEDIC SURGERY

## 2020-01-01 PROCEDURE — 76705 ECHO EXAM OF ABDOMEN: CPT

## 2020-01-01 PROCEDURE — 77300 RADIATION THERAPY DOSE PLAN: CPT | Performed by: RADIOLOGY

## 2020-01-01 PROCEDURE — 85025 COMPLETE CBC W/AUTO DIFF WBC: CPT

## 2020-01-01 PROCEDURE — 1036F TOBACCO NON-USER: CPT | Performed by: NURSE PRACTITIONER

## 2020-01-01 PROCEDURE — 99214 OFFICE O/P EST MOD 30 MIN: CPT | Performed by: NURSE PRACTITIONER

## 2020-01-01 PROCEDURE — 82728 ASSAY OF FERRITIN: CPT

## 2020-01-01 PROCEDURE — 86705 HEP B CORE ANTIBODY IGM: CPT

## 2020-01-01 PROCEDURE — 87340 HEPATITIS B SURFACE AG IA: CPT

## 2020-01-01 PROCEDURE — 82607 VITAMIN B-12: CPT

## 2020-01-01 PROCEDURE — 88341 IMHCHEM/IMCYTCHM EA ADD ANTB: CPT | Performed by: SURGERY

## 2020-01-01 PROCEDURE — 88342 IMHCHEM/IMCYTCHM 1ST ANTB: CPT | Performed by: STUDENT IN AN ORGANIZED HEALTH CARE EDUCATION/TRAINING PROGRAM

## 2020-01-01 PROCEDURE — 2022F DILAT RTA XM EVC RTNOPTHY: CPT | Performed by: INTERNAL MEDICINE

## 2020-01-01 PROCEDURE — 83735 ASSAY OF MAGNESIUM: CPT

## 2020-01-01 PROCEDURE — 36415 COLL VENOUS BLD VENIPUNCTURE: CPT

## 2020-01-01 PROCEDURE — 84165 PROTEIN E-PHORESIS SERUM: CPT

## 2020-01-01 PROCEDURE — 2022F DILAT RTA XM EVC RTNOPTHY: CPT | Performed by: SURGERY

## 2020-01-01 PROCEDURE — 4040F PNEUMOC VAC/ADMIN/RCVD: CPT | Performed by: SURGERY

## 2020-01-01 PROCEDURE — 77336 RADIATION PHYSICS CONSULT: CPT | Performed by: RADIOLOGY

## 2020-01-01 PROCEDURE — 77295 3-D RADIOTHERAPY PLAN: CPT | Performed by: RADIOLOGY

## 2020-01-01 PROCEDURE — 84443 ASSAY THYROID STIM HORMONE: CPT

## 2020-01-01 PROCEDURE — 3008F BODY MASS INDEX DOCD: CPT | Performed by: INTERNAL MEDICINE

## 2020-01-01 PROCEDURE — 88312 SPECIAL STAINS GROUP 1: CPT | Performed by: STUDENT IN AN ORGANIZED HEALTH CARE EDUCATION/TRAINING PROGRAM

## 2020-01-01 PROCEDURE — 2022F DILAT RTA XM EVC RTNOPTHY: CPT | Performed by: PHYSICIAN ASSISTANT

## 2020-01-01 PROCEDURE — 99204 OFFICE O/P NEW MOD 45 MIN: CPT | Performed by: INTERNAL MEDICINE

## 2020-01-01 PROCEDURE — 80053 COMPREHEN METABOLIC PANEL: CPT

## 2020-01-01 PROCEDURE — 81342 TRG GENE REARRANGEMENT ANAL: CPT | Performed by: STUDENT IN AN ORGANIZED HEALTH CARE EDUCATION/TRAINING PROGRAM

## 2020-01-01 PROCEDURE — 88342 IMHCHEM/IMCYTCHM 1ST ANTB: CPT | Performed by: SURGERY

## 2020-01-01 PROCEDURE — 80061 LIPID PANEL: CPT

## 2020-01-01 PROCEDURE — 77280 THER RAD SIMULAJ FIELD SMPL: CPT | Performed by: RADIOLOGY

## 2020-01-01 PROCEDURE — 85652 RBC SED RATE AUTOMATED: CPT

## 2020-01-01 PROCEDURE — 99211 OFF/OP EST MAY X REQ PHY/QHP: CPT | Performed by: RADIOLOGY

## 2020-01-01 PROCEDURE — A9552 F18 FDG: HCPCS

## 2020-01-01 PROCEDURE — 99203 OFFICE O/P NEW LOW 30 MIN: CPT | Performed by: SURGERY

## 2020-01-01 PROCEDURE — 82232 ASSAY OF BETA-2 PROTEIN: CPT

## 2020-01-01 PROCEDURE — 77290 THER RAD SIMULAJ FIELD CPLX: CPT | Performed by: RADIOLOGY

## 2020-01-01 PROCEDURE — 78815 PET IMAGE W/CT SKULL-THIGH: CPT

## 2020-01-01 PROCEDURE — 83540 ASSAY OF IRON: CPT

## 2020-01-01 PROCEDURE — 86140 C-REACTIVE PROTEIN: CPT

## 2020-01-01 PROCEDURE — 1160F RVW MEDS BY RX/DR IN RCRD: CPT | Performed by: SURGERY

## 2020-01-01 PROCEDURE — 1160F RVW MEDS BY RX/DR IN RCRD: CPT | Performed by: INTERNAL MEDICINE

## 2020-01-01 PROCEDURE — 85007 BL SMEAR W/DIFF WBC COUNT: CPT

## 2020-01-01 PROCEDURE — 88364 INSITU HYBRIDIZATION (FISH): CPT | Performed by: STUDENT IN AN ORGANIZED HEALTH CARE EDUCATION/TRAINING PROGRAM

## 2020-01-01 PROCEDURE — 86803 HEPATITIS C AB TEST: CPT

## 2020-01-01 PROCEDURE — 3008F BODY MASS INDEX DOCD: CPT | Performed by: NURSE PRACTITIONER

## 2020-01-01 PROCEDURE — 99214 OFFICE O/P EST MOD 30 MIN: CPT | Performed by: SURGERY

## 2020-01-01 PROCEDURE — 84550 ASSAY OF BLOOD/URIC ACID: CPT

## 2020-01-01 PROCEDURE — 4040F PNEUMOC VAC/ADMIN/RCVD: CPT | Performed by: PHYSICIAN ASSISTANT

## 2020-01-01 PROCEDURE — 1125F AMNT PAIN NOTED PAIN PRSNT: CPT | Performed by: NURSE PRACTITIONER

## 2020-01-01 PROCEDURE — 88341 IMHCHEM/IMCYTCHM EA ADD ANTB: CPT | Performed by: STUDENT IN AN ORGANIZED HEALTH CARE EDUCATION/TRAINING PROGRAM

## 2020-01-01 PROCEDURE — 80048 BASIC METABOLIC PNL TOTAL CA: CPT

## 2020-01-01 PROCEDURE — 2022F DILAT RTA XM EVC RTNOPTHY: CPT | Performed by: NURSE PRACTITIONER

## 2020-01-01 PROCEDURE — 99213 OFFICE O/P EST LOW 20 MIN: CPT | Performed by: NURSE PRACTITIONER

## 2020-01-01 PROCEDURE — 99024 POSTOP FOLLOW-UP VISIT: CPT | Performed by: PHYSICIAN ASSISTANT

## 2020-01-01 PROCEDURE — 82784 ASSAY IGA/IGD/IGG/IGM EACH: CPT

## 2020-01-01 PROCEDURE — 99214 OFFICE O/P EST MOD 30 MIN: CPT | Performed by: INTERNAL MEDICINE

## 2020-01-01 PROCEDURE — 4040F PNEUMOC VAC/ADMIN/RCVD: CPT | Performed by: INTERNAL MEDICINE

## 2020-01-01 PROCEDURE — 83883 ASSAY NEPHELOMETRY NOT SPEC: CPT

## 2020-01-01 PROCEDURE — 88305 TISSUE EXAM BY PATHOLOGIST: CPT | Performed by: STUDENT IN AN ORGANIZED HEALTH CARE EDUCATION/TRAINING PROGRAM

## 2020-01-01 PROCEDURE — 77331 SPECIAL RADIATION DOSIMETRY: CPT | Performed by: RADIOLOGY

## 2020-01-01 PROCEDURE — 77334 RADIATION TREATMENT AID(S): CPT | Performed by: RADIOLOGY

## 2020-01-01 PROCEDURE — 88360 TUMOR IMMUNOHISTOCHEM/MANUAL: CPT | Performed by: SURGERY

## 2020-01-01 PROCEDURE — 82948 REAGENT STRIP/BLOOD GLUCOSE: CPT

## 2020-01-01 PROCEDURE — 1160F RVW MEDS BY RX/DR IN RCRD: CPT | Performed by: PHYSICIAN ASSISTANT

## 2020-01-01 PROCEDURE — 86704 HEP B CORE ANTIBODY TOTAL: CPT

## 2020-01-01 PROCEDURE — 20610 DRAIN/INJ JOINT/BURSA W/O US: CPT | Performed by: ORTHOPAEDIC SURGERY

## 2020-01-01 PROCEDURE — 1160F RVW MEDS BY RX/DR IN RCRD: CPT | Performed by: NURSE PRACTITIONER

## 2020-01-01 RX ORDER — ATENOLOL 50 MG/1
TABLET ORAL
Qty: 90 TABLET | Refills: 1 | Status: SHIPPED | OUTPATIENT
Start: 2020-01-01 | End: 2021-01-01 | Stop reason: SDUPTHER

## 2020-01-01 RX ORDER — SITAGLIPTIN 50 MG/1
TABLET, FILM COATED ORAL
Qty: 30 TABLET | Refills: 3 | Status: SHIPPED | OUTPATIENT
Start: 2020-01-01 | End: 2020-01-01

## 2020-01-01 RX ORDER — HYDROCHLOROTHIAZIDE 25 MG/1
25 TABLET ORAL DAILY
Qty: 90 TABLET | Refills: 1 | Status: SHIPPED | OUTPATIENT
Start: 2020-01-01 | End: 2020-01-01

## 2020-01-01 RX ORDER — BENAZEPRIL HYDROCHLORIDE 20 MG/1
20 TABLET ORAL DAILY
Qty: 90 TABLET | Refills: 1 | Status: SHIPPED | OUTPATIENT
Start: 2020-01-01 | End: 2021-01-01 | Stop reason: SDUPTHER

## 2020-01-01 RX ORDER — HYDROCHLOROTHIAZIDE 25 MG/1
TABLET ORAL
Qty: 90 TABLET | Refills: 1 | Status: SHIPPED | OUTPATIENT
Start: 2020-01-01 | End: 2021-01-01 | Stop reason: HOSPADM

## 2020-01-01 RX ORDER — SITAGLIPTIN 50 MG/1
TABLET, FILM COATED ORAL
Qty: 30 TABLET | Refills: 3 | Status: SHIPPED | OUTPATIENT
Start: 2020-01-01 | End: 2021-01-01 | Stop reason: HOSPADM

## 2020-01-01 RX ORDER — SIMVASTATIN 20 MG
20 TABLET ORAL
Qty: 90 TABLET | Refills: 1 | Status: SHIPPED | OUTPATIENT
Start: 2020-01-01 | End: 2020-01-01

## 2020-01-01 RX ORDER — AMLODIPINE BESYLATE 10 MG/1
TABLET ORAL
Qty: 90 TABLET | Refills: 1 | Status: SHIPPED | OUTPATIENT
Start: 2020-01-01 | End: 2021-01-01 | Stop reason: HOSPADM

## 2020-01-01 RX ORDER — SIMVASTATIN 20 MG
TABLET ORAL
Qty: 90 TABLET | Refills: 1 | Status: SHIPPED | OUTPATIENT
Start: 2020-01-01 | End: 2021-01-01 | Stop reason: HOSPADM

## 2020-01-01 RX ORDER — METHYLPREDNISOLONE ACETATE 40 MG/ML
2 INJECTION, SUSPENSION INTRA-ARTICULAR; INTRALESIONAL; INTRAMUSCULAR; SOFT TISSUE
Status: COMPLETED | OUTPATIENT
Start: 2020-01-01 | End: 2020-01-01

## 2020-01-01 RX ORDER — BENAZEPRIL HYDROCHLORIDE 20 MG/1
20 TABLET ORAL DAILY
Qty: 90 TABLET | Refills: 1 | Status: SHIPPED | OUTPATIENT
Start: 2020-01-01 | End: 2020-01-01 | Stop reason: SDUPTHER

## 2020-01-01 RX ORDER — AMLODIPINE BESYLATE 10 MG/1
10 TABLET ORAL DAILY
Qty: 90 TABLET | Refills: 1 | Status: SHIPPED | OUTPATIENT
Start: 2020-01-01 | End: 2020-01-01

## 2020-01-01 RX ORDER — BUPIVACAINE HYDROCHLORIDE 2.5 MG/ML
4 INJECTION, SOLUTION INFILTRATION; PERINEURAL
Status: COMPLETED | OUTPATIENT
Start: 2020-01-01 | End: 2020-01-01

## 2020-01-01 RX ADMIN — BUPIVACAINE HYDROCHLORIDE 4 ML: 2.5 INJECTION, SOLUTION INFILTRATION; PERINEURAL at 11:39

## 2020-01-01 RX ADMIN — METHYLPREDNISOLONE ACETATE 2 ML: 40 INJECTION, SUSPENSION INTRA-ARTICULAR; INTRALESIONAL; INTRAMUSCULAR; SOFT TISSUE at 11:39

## 2020-01-06 ENCOUNTER — HOSPITAL ENCOUNTER (OUTPATIENT)
Dept: ULTRASOUND IMAGING | Facility: HOSPITAL | Age: 70
Discharge: HOME | End: 2020-01-06
Attending: INTERNAL MEDICINE
Payer: MEDICARE

## 2020-01-06 DIAGNOSIS — R79.89 ELEVATED LFTS: ICD-10-CM

## 2020-01-06 PROCEDURE — 76705 ECHO EXAM OF ABDOMEN: CPT

## 2020-01-10 ENCOUNTER — TRANSCRIBE ORDERS (OUTPATIENT)
Dept: ADMINISTRATIVE | Facility: HOSPITAL | Age: 70
End: 2020-01-10

## 2020-01-10 DIAGNOSIS — R16.0 LIVER MASS: Primary | ICD-10-CM

## 2020-01-17 ENCOUNTER — HOSPITAL ENCOUNTER (OUTPATIENT)
Dept: MRI IMAGING | Facility: HOSPITAL | Age: 70
Discharge: HOME | End: 2020-01-17
Attending: INTERNAL MEDICINE
Payer: MEDICARE

## 2020-01-17 ENCOUNTER — APPOINTMENT (OUTPATIENT)
Dept: LAB | Facility: HOSPITAL | Age: 70
End: 2020-01-17
Attending: INTERNAL MEDICINE
Payer: MEDICARE

## 2020-01-17 DIAGNOSIS — E11.9 TYPE 2 DIABETES MELLITUS WITHOUT COMPLICATION, WITHOUT LONG-TERM CURRENT USE OF INSULIN (HCC): ICD-10-CM

## 2020-01-17 DIAGNOSIS — I10 HYPERTENSION, UNSPECIFIED TYPE: ICD-10-CM

## 2020-01-17 DIAGNOSIS — R16.0 LIVER MASS: ICD-10-CM

## 2020-01-17 DIAGNOSIS — E55.9 VITAMIN D DEFICIENCY: ICD-10-CM

## 2020-01-17 DIAGNOSIS — E78.5 HYPERLIPIDEMIA, UNSPECIFIED HYPERLIPIDEMIA TYPE: ICD-10-CM

## 2020-01-17 LAB
25(OH)D3 SERPL-MCNC: 44.1 NG/ML (ref 30–100)
ALBUMIN SERPL BCP-MCNC: 4.3 G/DL (ref 3.5–5.7)
ALP SERPL-CCNC: 42 U/L (ref 55–165)
ALT SERPL W P-5'-P-CCNC: 55 U/L (ref 7–52)
ANION GAP SERPL CALCULATED.3IONS-SCNC: 8 MMOL/L (ref 4–13)
AST SERPL W P-5'-P-CCNC: 34 U/L (ref 13–39)
BILIRUB SERPL-MCNC: 1.5 MG/DL (ref 0.2–1)
BUN SERPL-MCNC: 11 MG/DL (ref 7–25)
CALCIUM ALBUM COR SERPL-MCNC: 11.2 MG/DL (ref 8.3–10.1)
CALCIUM SERPL-MCNC: 11.4 MG/DL (ref 8.6–10.5)
CHLORIDE SERPL-SCNC: 100 MMOL/L (ref 98–107)
CHOLEST SERPL-MCNC: 144 MG/DL (ref 0–200)
CO2 SERPL-SCNC: 31 MMOL/L (ref 21–31)
CREAT SERPL-MCNC: 0.7 MG/DL (ref 0.7–1.3)
EST. AVERAGE GLUCOSE BLD GHB EST-MCNC: 128 MG/DL
GFR SERPL CREATININE-BSD FRML MDRD: 96 ML/MIN/1.73SQ M
GLUCOSE SERPL-MCNC: 131 MG/DL (ref 65–99)
HBA1C MFR BLD: 6.1 % (ref 4.2–6.3)
HDLC SERPL-MCNC: 45 MG/DL
LDLC SERPL CALC-MCNC: 49 MG/DL (ref 0–100)
NONHDLC SERPL-MCNC: 99 MG/DL
POTASSIUM SERPL-SCNC: 4.1 MMOL/L (ref 3.5–5.5)
PROT SERPL-MCNC: 7.2 G/DL (ref 6.4–8.9)
SODIUM SERPL-SCNC: 139 MMOL/L (ref 134–143)
TRIGL SERPL-MCNC: 251 MG/DL (ref 44–166)

## 2020-01-17 PROCEDURE — 83036 HEMOGLOBIN GLYCOSYLATED A1C: CPT

## 2020-01-17 PROCEDURE — 82107 ALPHA-FETOPROTEIN L3: CPT

## 2020-01-17 PROCEDURE — 82306 VITAMIN D 25 HYDROXY: CPT

## 2020-01-17 PROCEDURE — 80053 COMPREHEN METABOLIC PANEL: CPT

## 2020-01-17 PROCEDURE — 36415 COLL VENOUS BLD VENIPUNCTURE: CPT

## 2020-01-17 PROCEDURE — 80061 LIPID PANEL: CPT

## 2020-01-20 LAB
AFP L3 MFR SERPL: NORMAL % (ref 0–9.9)
AFP SERPL-MCNC: 3.2 NG/ML (ref 0–8)

## 2020-01-21 ENCOUNTER — HOSPITAL ENCOUNTER (OUTPATIENT)
Dept: MRI IMAGING | Facility: HOSPITAL | Age: 70
Discharge: HOME | End: 2020-01-21
Payer: MEDICARE

## 2020-01-21 PROCEDURE — 74183 MRI ABD W/O CNTR FLWD CNTR: CPT

## 2020-01-21 PROCEDURE — A9585 GADOBUTROL INJECTION: HCPCS | Performed by: INTERNAL MEDICINE

## 2020-01-21 RX ADMIN — GADOBUTROL 9 ML: 604.72 INJECTION INTRAVENOUS at 11:21

## 2020-01-27 DIAGNOSIS — I10 ESSENTIAL HYPERTENSION: ICD-10-CM

## 2020-01-28 RX ORDER — ATENOLOL 50 MG/1
50 TABLET ORAL DAILY
Qty: 90 TABLET | Refills: 1 | Status: SHIPPED | OUTPATIENT
Start: 2020-01-28 | End: 2020-01-01

## 2020-03-16 DIAGNOSIS — E10.9 TYPE 1 DIABETES MELLITUS WITHOUT COMPLICATION (HCC): ICD-10-CM

## 2020-03-16 DIAGNOSIS — E11.8 TYPE 2 DIABETES MELLITUS WITH COMPLICATION, WITHOUT LONG-TERM CURRENT USE OF INSULIN (HCC): ICD-10-CM

## 2020-03-16 NOTE — TELEPHONE ENCOUNTER
Patient called needs refills on     Januvia 50 mg tab 30 day supply    Test strips- one touch ultra blue test strips box 100    [facility]

## 2020-03-26 DIAGNOSIS — M19.90 ARTHRITIS: ICD-10-CM

## 2020-05-27 PROBLEM — N18.4 DIABETES MELLITUS DUE TO UNDERLYING CONDITION WITH STAGE 4 CHRONIC KIDNEY DISEASE, WITH LONG-TERM CURRENT USE OF INSULIN (HCC): Status: ACTIVE | Noted: 2018-08-09

## 2020-05-27 PROBLEM — Z79.4 DIABETES MELLITUS DUE TO UNDERLYING CONDITION WITH STAGE 4 CHRONIC KIDNEY DISEASE, WITH LONG-TERM CURRENT USE OF INSULIN (HCC): Status: ACTIVE | Noted: 2018-08-09

## 2020-05-27 PROBLEM — M16.11 ARTHRITIS OF RIGHT HIP: Status: ACTIVE | Noted: 2020-01-01

## 2020-05-27 PROBLEM — E08.22 DIABETES MELLITUS DUE TO UNDERLYING CONDITION WITH STAGE 4 CHRONIC KIDNEY DISEASE, WITH LONG-TERM CURRENT USE OF INSULIN (HCC): Status: ACTIVE | Noted: 2018-08-09

## 2020-06-15 PROBLEM — D48.5 NEOPLASM OF UNCERTAIN BEHAVIOR OF SKIN OF FACE: Status: ACTIVE | Noted: 2020-01-01

## 2020-06-22 NOTE — PROGRESS NOTES
Post-Op Note - General Surgery   Hanane Mejia 79 y o  male MRN: [id number]  Encounter: [id number]    Assessment/Plan    Neoplasm of uncertain behavior of skin of face  Patient returns for follow-up 1 week after punch biopsy for lesion of left temple x2  No problems postop  The wound is healed without bleeding or infection  Suture removed uneventfully  The pathology not yet available, will arrange for additional follow-up with Dr Carolina Schuler to discuss further  Questions answered, follow-up arranged  Diagnoses and all orders for this visit:    Neoplasm of uncertain behavior of skin of face        Subjective      Chief Complaint   Patient presents with    Suture / Staple Removal     1wk f/u s/p Punch Biopsy left temple 6-15-20, suture removal       Patient is here today one week follow up s/p punch biopsy of left temple 06-, suture removal   The punch biopsy is red and sutures are intact  Stated that that biopsy site is sensitive  Yakov Valente 66-year-old male here for follow-up after punch biopsy of a left temple lesion x2  Reports no problems postop  Mild sensitivity without pain  No redness or drainage  No fevers or chills  Review of Systems   Constitutional: Negative for chills and fever  Respiratory: Negative for cough and shortness of breath  Cardiovascular: Negative for chest pain and palpitations  Gastrointestinal: Negative for abdominal pain, nausea and vomiting  Skin: Positive for wound  All other systems reviewed and are negative  The following portions of the patient's history were reviewed and updated as appropriate: allergies, current medications, past family history, past medical history, past social history, past surgical history and problem list     Objective      Temperature 97 8 °F (36 6 °C), temperature source Temporal    Physical Exam   Constitutional: He is oriented to person, place, and time   He appears well-developed and well-nourished  No distress  HENT:   Head: Normocephalic and atraumatic  Eyes: Pupils are equal, round, and reactive to light  Conjunctivae and EOM are normal    Neck: Normal range of motion  Pulmonary/Chest: No respiratory distress  Musculoskeletal: Normal range of motion  Neurological: He is alert and oriented to person, place, and time  Skin: Skin is warm and dry  Capillary refill takes less than 2 seconds  He is not diaphoretic  Psychiatric: He has a normal mood and affect  His behavior is normal    Nursing note and vitals reviewed        Signature:  Thomas Caballero PA-C  Date: 8/1/2020 Time: 3:52 PM

## 2020-07-03 NOTE — TELEPHONE ENCOUNTER
Patient notified by phone of final result  He is scheduled to see Dr Prabhakar Matson of Medical Oncology July 28th  All questions were answered to his satisfaction

## 2020-07-28 PROBLEM — C83.00 SMALL CELL B-CELL LYMPHOMA (HCC): Status: ACTIVE | Noted: 2020-01-01

## 2020-07-28 NOTE — PROGRESS NOTES
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 ([address] )  The patient was educated about the pathology findings which seems to be compatible with SLL/CLL  He does seem to have multiple lesions on the left temple area of his face  The patient was told that we will have to pursue further evaluation to rule out systemic disease with the lymphadenopathy or splenomegaly  We will aim to pursue a PET-CT scan of the entire body and pursue other testing to rule out a chronic leukemic component of the SLL  The patient was told that the we are going to discuss treatment options after the initial workup is complete     - CBC and differential; Future  - Comprehensive metabolic panel; Future  - LD,Blood; Future  - C-reactive protein; Future  - Sedimentation rate, automated; Future  - IgG, IgA, IgM; Future  - Immunoglobulin free LT chains blood; Future  - Beta 2 microglobulin, serum; Future  - Protein, Total and Protein Electrophoresis with Immunofixation; Future  - Chronic Hepatitis Panel; Future  - Vitamin B12; Future  - Uric acid; Future  - Iron Panel (Includes Ferritin, Iron Sat%, Iron, and TIBC); Future  - Ferritin; Future  - NM PET CT skull base to mid thigh; Future        HPI:  This is a 22-year-old the gentleman with history of diabetes mellitus type 2, hyperlipidemia, hypertension and glaucoma  The patient stated that he noticed skin rash on the left side of his face close to the left ear area  The skin rash became raised and transformed into 3 or 4 small masses adjacent to each other  A punch biopsy was done on 2 of does lesions from the left temple area on 06/15/2020    The initial pathology revealed dense pan dermal atypical lymphoid infiltrate  The case was then sent to [address] [place] [address] for 2nd opinion  The final diagnosis came back compatible with small lymphocytic lymphoma/chronic lymphocytic leukemia  The patient denied significant constitutional symptoms  He did complain about mild fatigue  He denied any other lesions on his body  His blood work from January showed hypercalcemia of unknown etiology  Interval history:    ROS: Review of Systems   Constitutional: Positive for appetite change and fatigue  Negative for diaphoresis and fever  HENT: Negative for congestion, dental problem, facial swelling, hearing loss, tinnitus and trouble swallowing  Eyes: Negative for visual disturbance  Respiratory: Negative for cough, chest tightness, shortness of breath and wheezing  Cardiovascular: Negative for chest pain and leg swelling  Gastrointestinal: Negative for abdominal distention, abdominal pain, blood in stool, constipation, diarrhea, nausea and vomiting  Genitourinary: Negative for dysuria, hematuria and urgency  Musculoskeletal: Negative for arthralgias, myalgias and neck pain  Skin: Negative  Negative for color change, pallor, rash and wound  Neurological: Positive for dizziness  Negative for weakness, numbness and headaches  Hematological: Negative for adenopathy  Psychiatric/Behavioral: Positive for sleep disturbance  Negative for agitation, behavioral problems, confusion, hallucinations and self-injury  The patient is not nervous/anxious and is not hyperactive          Past Medical History:   Diagnosis Date    Cerebral vascular disease     Diabetes mellitus ([address] )     Hyperlipidemia     Hypertension        Past Surgical History:   Procedure Laterality Date    CATARACT EXTRACTION      GANGLION CYST EXCISION      GLAUCOMA SURGERY Right 2019    SKIN LESION EXCISION         Social History     Socioeconomic History    Marital status: Single     Spouse name: None    Number of children: None    Years of education: None    Highest education level: None   Occupational History    None   Social Needs    Financial resource strain: None    Food insecurity:     Worry: None     Inability: None    Transportation needs:     Medical: None     Non-medical: None   Tobacco Use    Smoking status: Former Smoker     Types: Cigars    Smokeless tobacco: Never Used   Substance and Sexual Activity    Alcohol use:  Yes     Alcohol/week: 1 0 standard drinks     Types: 1 Cans of beer per week     Comment: rarely    Drug use: No    Sexual activity: None   Lifestyle    Physical activity:     Days per week: None     Minutes per session: None    Stress: None   Relationships    Social connections:     Talks on phone: None     Gets together: None     Attends Rastafarian service: None     Active member of club or organization: None     Attends meetings of clubs or organizations: None     Relationship status: None    Intimate partner violence:     Fear of current or ex partner: None     Emotionally abused: None     Physically abused: None     Forced sexual activity: None   Other Topics Concern    None   Social History Narrative    None       Family History   Problem Relation Age of Onset    Heart disease Mother     Asthma Mother     Emphysema Mother     Hypertension Mother     Diabetes Father        Allergies   Allergen Reactions    Allopurinol     Sulfa Antibiotics          Current Outpatient Medications:     amLODIPine (NORVASC) 10 mg tablet, Take 1 tablet (10 mg total) by mouth daily, Disp: 90 tablet, Rfl: 1    aspirin 81 MG tablet, Take 81 mg by mouth daily, Disp: , Rfl:     atenolol (TENORMIN) 50 mg tablet, Take 1 tablet (50 mg total) by mouth daily, Disp: 90 tablet, Rfl: 1    benazepril (LOTENSIN) 20 mg tablet, Take 1 tablet (20 mg total) by mouth daily, Disp: 90 tablet, Rfl: 1    COMBIGAN 0 2-0 5 %, , Disp: , Rfl:     diclofenac sodium (VOLTAREN) 1 %, Apply 2 g topically 2 (two) times a day as needed (pain), Disp: 100 g, Rfl: 2    ergocalciferol (VITAMIN D2) 50,000 units, Take 1 capsule (50,000 Units total) by mouth once a week, Disp: 12 capsule, Rfl: 0    glucose blood (ONE TOUCH ULTRA TEST) test strip, 1 each by Other route daily Use as instructed, Disp: 100 each, Rfl: 3    glucose blood test strip, use 1 TEST STRIP to TEST BLOOD SUGAR once daily, Disp: , Rfl:     hydrochlorothiazide (HYDRODIURIL) 25 mg tablet, Take 1 tablet (25 mg total) by mouth daily, Disp: 90 tablet, Rfl: 1    JANUVIA 50 MG tablet, take 1 tablet by mouth once daily, Disp: 30 tablet, Rfl: 3    metFORMIN (GLUCOPHAGE) 1000 MG tablet, Take 1 tablet (1,000 mg total) by mouth 2 (two) times a day with meals, Disp: 180 tablet, Rfl: 1    ONETOUCH DELICA LANCETS FINE MISC, by Does not apply route daily, Disp: 50 each, Rfl: 2    RHOPRESSA 0 02 % SOLN, , Disp: , Rfl:     simvastatin (ZOCOR) 20 mg tablet, Take 1 tablet (20 mg total) by mouth daily at bedtime, Disp: 90 tablet, Rfl: 1    travoprost (TRAVATAN Z) 0 004 % ophthalmic solution, Administer 1 drop to both eyes daily at bedtime, Disp: , Rfl:       Physical Exam:  /72 (BP Location: Left arm, Patient Position: Sitting)   Pulse 78   Temp (!) 97 4 °F (36 3 °C) (Tympanic)   Resp 16   Ht 5' 7" (1 702 m)   Wt 86 8 kg (191 lb 6 4 oz)   SpO2 97%   BMI 29 98 kg/m²     Physical Exam   Constitutional: He is oriented to person, place, and time  He appears well-developed and well-nourished  HENT:   Head: Normocephalic and atraumatic  Eyes: Pupils are equal, round, and reactive to light  Conjunctivae and EOM are normal  Right eye exhibits no discharge  Left eye exhibits no discharge  No scleral icterus  Neck: Normal range of motion  Neck supple  No tracheal deviation present  No thyromegaly present  Cardiovascular: Normal rate, regular rhythm and normal heart sounds  Exam reveals no friction rub  No murmur heard    Pulmonary/Chest: Effort normal and breath sounds normal  No respiratory distress  He has no wheezes  He has no rales  He exhibits no tenderness  Abdominal: Soft  Bowel sounds are normal  He exhibits no distension and no mass  There is no hepatosplenomegaly, splenomegaly or hepatomegaly  There is no tenderness  There is no rebound and no guarding  Musculoskeletal: Normal range of motion  He exhibits no edema, tenderness or deformity  Lymphadenopathy:     He has no cervical adenopathy  Neurological: He is alert and oriented to person, place, and time  He has normal reflexes  He displays normal reflexes  No cranial nerve deficit  Coordination normal    Skin: Skin is warm and dry  No rash noted  No erythema  No pallor  Psychiatric: He has a normal mood and affect  His behavior is normal  Judgment and thought content normal          Labs:  Lab Results   Component Value Date    WBC 8 60 01/15/2019    HGB 15 5 01/15/2019    HCT 46 7 01/15/2019    MCV 86 01/15/2019     (L) 01/15/2019     Lab Results   Component Value Date     12/30/2015    K 4 1 01/17/2020     01/17/2020    CO2 31 01/17/2020    ANIONGAP 10 12/30/2015    BUN 11 01/17/2020    CREATININE 0 70 01/17/2020    GLUCOSE 130 12/30/2015    GLUF 140 (H) 11/26/2019    CALCIUM 11 4 (H) 01/17/2020    CORRECTEDCA 11 2 (H) 01/17/2020    AST 34 01/17/2020    ALT 55 (H) 01/17/2020    ALKPHOS 42 (L) 01/17/2020    EGFR 96 01/17/2020     No results found for: TSH    Patient voiced understanding and agreement in the above discussion  Aware to contact our office with questions/symptoms in the interim

## 2020-08-01 NOTE — ASSESSMENT & PLAN NOTE
Patient returns for follow-up 1 week after punch biopsy for lesion of left temple x2  No problems postop  The wound is healed without bleeding or infection  Suture removed uneventfully  The pathology not yet available, will arrange for additional follow-up with Dr Tiffani Tracey to discuss further  Questions answered, follow-up arranged

## 2020-08-05 NOTE — TELEPHONE ENCOUNTER
Patient is calling in requesting for his after visit summary to be mailed over to him    [phone] [facility] [id number]

## 2020-08-18 NOTE — PROGRESS NOTES
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 ([facility] [address] )  I discussed with the patient the PET-CT scan result which did not reveal any hint of obvious systemic lymphoproliferative disease at least at this point in time  The PET scan did however show as expected hypermetabolic activity on the left temple compatible with the skin lesions due to the small cell lymphocytic lymphoma  The patient was told that the since his disease is not systemic at least at this point in time  It would be appropriate to consider local electron beam radiation to the skin lesions which will be discussed extensively by the Radiation Oncology team   We will continue to monitor the patient closely to make sure that he is not developing recurrence or systemic disease which would  be treated systemic targeted therapy accordingly  - Ambulatory referral to Radiation Oncology; Future  - CBC and differential; Future  - Comprehensive metabolic panel; Future  - LD,Blood; Future    2  Hyperuricemia  He seems to have borderline high uric acid  The etiology is not entirely clear  The patient was offered to get him started on allopurinol  However, he stated that he would discuss that with his primary care team 1st   - Uric acid; Future    3  Hypercalcemia  He seems to have chronic hypercalcemia  The exact etiology is not entirely clear  I think would be appropriate to consider Endocrinology consultation   - PTH, intact; Future        HPI:  This is a 72-year-old the gentleman with history of diabetes mellitus type 2, hyperlipidemia, hypertension and glaucoma  The patient stated that he noticed skin rash on the left side of his face close to the left ear area  The skin rash became raised and transformed into 3 or 4 small masses adjacent to each other    A punch biopsy was done on 2 of does lesions from the left temple area on 06/15/2020  The initial pathology revealed dense pan dermal atypical lymphoid infiltrate  The case was then sent to [address] [place] [address] for 2nd opinion  The final diagnosis came back compatible with small lymphocytic lymphoma/chronic lymphocytic leukemia  The patient denied significant constitutional symptoms  He did complain about mild fatigue  He denied any other lesions on his body  His blood work from January showed hypercalcemia of unknown etiology          Interval history:  The patient came today for a follow-up visit  He had a PET-CT scan on 08/04/2020 which showed:  IMPRESSION:  1  There is a hypermetabolic cutaneous nodule overlying the left zygomatic arch measuring 2 x 0 9 cm, SUV 6 3  This is most concerning for malignancy  2   No additional hypermetabolic cutaneous lesion  3   No additional hypermetabolic lesions or adenopathy in the neck, chest, abdomen or pelvis that are concerning for malignancy/metastases  He also had blood work on 07/28/2020 which showed normal white cell count of 8 9 with normal white cell differential   His hemoglobin was 14 7 with a platelet count of a [id number]  He continues to have high calcium level with corrected calcium of 10 7  His creatinine was 0 69 with total bili of 1 27 and normal liver enzymes  Inflammation markers are within normal range  Monoclonal gammopathy workup came back negative  Uric acid was 8 8  The rest of the workup was normal   The patient denied constitutional symptoms  He continues to have the lesions on the left side of his temple which are stable in size  ROS: Review of Systems   Constitutional: Negative for appetite change, diaphoresis, fatigue and fever  HENT: Negative for congestion, dental problem, facial swelling, hearing loss, tinnitus and trouble swallowing  Eyes: Negative for visual disturbance  Respiratory: Negative for cough, chest tightness, shortness of breath and wheezing  Cardiovascular: Negative for chest pain and leg swelling  Gastrointestinal: Negative for abdominal distention, abdominal pain, blood in stool, constipation, diarrhea, nausea and vomiting  Genitourinary: Negative for dysuria, hematuria and urgency  Musculoskeletal: Negative for arthralgias, myalgias and neck pain  Skin: Negative  Negative for color change, pallor, rash and wound  Neurological: Negative for dizziness, weakness, numbness and headaches  Hematological: Negative for adenopathy  Psychiatric/Behavioral: Negative for agitation, behavioral problems, confusion, hallucinations, self-injury and sleep disturbance  The patient is not nervous/anxious and is not hyperactive  Past Medical History:   Diagnosis Date    Cerebral vascular disease     Diabetes mellitus ([facility] [address] )     Hyperlipidemia     Hypertension        Past Surgical History:   Procedure Laterality Date    CATARACT EXTRACTION      GANGLION CYST EXCISION      GLAUCOMA SURGERY Right 2019    SKIN LESION EXCISION         Social History     Socioeconomic History    Marital status: Single     Spouse name: None    Number of children: None    Years of education: None    Highest education level: None   Occupational History    None   Social Needs    Financial resource strain: None    Food insecurity     Worry: None     Inability: None    Transportation needs     Medical: None     Non-medical: None   Tobacco Use    Smoking status: Former Smoker     Types: Cigars    Smokeless tobacco: Never Used   Substance and Sexual Activity    Alcohol use:  Yes     Alcohol/week: 1 0 standard drinks     Types: 1 Cans of beer per week     Comment: rarely    Drug use: No    Sexual activity: None   Lifestyle    Physical activity     Days per week: None     Minutes per session: None    Stress: None   Relationships    Social connections     Talks on phone: None     Gets together: None     Attends [employer] witness service: None     Active member of club or organization: None     Attends meetings of clubs or organizations: None     Relationship status: None    Intimate partner violence     Fear of current or ex partner: None     Emotionally abused: None     Physically abused: None     Forced sexual activity: None   Other Topics Concern    None   Social History Narrative    None       Family History   Problem Relation Age of Onset    Heart disease Mother     Asthma Mother     Emphysema Mother     Hypertension Mother     Diabetes Father        Allergies   Allergen Reactions    Allopurinol     Sulfa Antibiotics          Current Outpatient Medications:     amLODIPine (NORVASC) 10 mg tablet, Take 1 tablet (10 mg total) by mouth daily, Disp: 90 tablet, Rfl: 1    aspirin 81 MG tablet, Take 81 mg by mouth daily, Disp: , Rfl:     atenolol (TENORMIN) 50 mg tablet, take 1 tablet by mouth once daily, Disp: 90 tablet, Rfl: 1    benazepril (LOTENSIN) 20 mg tablet, Take 1 tablet (20 mg total) by mouth daily, Disp: 90 tablet, Rfl: 1    COMBIGAN 0 2-0 5 %, , Disp: , Rfl:     diclofenac sodium (VOLTAREN) 1 %, Apply 2 g topically 2 (two) times a day as needed (pain), Disp: 100 g, Rfl: 2    ergocalciferol (VITAMIN D2) 50,000 units, Take 1 capsule (50,000 Units total) by mouth once a week, Disp: 12 capsule, Rfl: 0    glucose blood (ONE TOUCH ULTRA TEST) test strip, 1 each by Other route daily Use as instructed, Disp: 100 each, Rfl: 3    glucose blood test strip, use 1 TEST STRIP to TEST BLOOD SUGAR once daily, Disp: , Rfl:     hydrochlorothiazide (HYDRODIURIL) 25 mg tablet, Take 1 tablet (25 mg total) by mouth daily, Disp: 90 tablet, Rfl: 1    Januvia 50 MG tablet, take 1 tablet by mouth once daily, Disp: 30 tablet, Rfl: 3    metFORMIN (GLUCOPHAGE) 1000 MG tablet, Take 1 tablet (1,000 mg total) by mouth 2 (two) times a day with meals, Disp: 180 tablet, Rfl: 1    ONETOUCH DELICA LANCETS FINE MISC, by Does not apply route daily, Disp: 50 each, Rfl: 2   RHOPRESSA 0 02 % SOLN, , Disp: , Rfl:     simvastatin (ZOCOR) 20 mg tablet, Take 1 tablet (20 mg total) by mouth daily at bedtime, Disp: 90 tablet, Rfl: 1    travoprost (TRAVATAN Z) 0 004 % ophthalmic solution, Administer 1 drop to both eyes daily at bedtime, Disp: , Rfl:       Physical Exam:  /78 (BP Location: Right arm, Patient Position: Sitting, Cuff Size: Adult)   Pulse 71   Temp (!) 97 3 °F (36 3 °C) (Tympanic)   Resp 16   Ht 5' 7" (1 702 m)   Wt 86 3 kg (190 lb 4 8 oz)   SpO2 98%   BMI 29 81 kg/m²     Physical Exam  Constitutional:       Appearance: He is well-developed  HENT:      Head: Normocephalic and atraumatic  Eyes:      General: No scleral icterus  Right eye: No discharge  Left eye: No discharge  Conjunctiva/sclera: Conjunctivae normal       Pupils: Pupils are equal, round, and reactive to light  Neck:      Musculoskeletal: Normal range of motion and neck supple  Thyroid: No thyromegaly  Trachea: No tracheal deviation  Cardiovascular:      Rate and Rhythm: Normal rate and regular rhythm  Heart sounds: Normal heart sounds  No murmur  No friction rub  Pulmonary:      Effort: Pulmonary effort is normal  No respiratory distress  Breath sounds: Normal breath sounds  No wheezing or rales  Chest:      Chest wall: No tenderness  Abdominal:      General: Bowel sounds are normal  There is no distension  Palpations: Abdomen is soft  There is no hepatomegaly, splenomegaly or mass  Tenderness: There is no abdominal tenderness  There is no guarding or rebound  Musculoskeletal: Normal range of motion  General: No tenderness or deformity  Lymphadenopathy:      Cervical: No cervical adenopathy  Skin:     General: Skin is warm and dry  Coloration: Skin is not pale  Findings: No erythema or rash  Neurological:      Mental Status: He is alert and oriented to person, place, and time        Cranial Nerves: No cranial nerve deficit  Coordination: Coordination normal       Deep Tendon Reflexes: Reflexes are normal and symmetric  Reflexes normal    Psychiatric:         Behavior: Behavior normal          Thought Content: Thought content normal          Judgment: Judgment normal            Labs:  Lab Results   Component Value Date    WBC 8 94 07/28/2020    HGB 14 7 07/28/2020    HCT 44 9 07/28/2020    MCV 89 07/28/2020     (L) 07/28/2020     Lab Results   Component Value Date     12/30/2015    K 3 9 07/28/2020     07/28/2020    CO2 29 07/28/2020    ANIONGAP 10 12/30/2015    BUN 10 07/28/2020    CREATININE 0 69 07/28/2020    GLUCOSE 130 12/30/2015    GLUF 140 (H) 11/26/2019    CALCIUM 10 6 (H) 07/28/2020    CORRECTEDCA 10 7 (H) 07/28/2020    AST 40 07/28/2020    ALT 68 07/28/2020    ALKPHOS 46 07/28/2020    EGFR 96 07/28/2020     No results found for: TSH    Patient voiced understanding and agreement in the above discussion  Aware to contact our office with questions/symptoms in the interim

## 2020-08-24 PROBLEM — C83.01 SMALL CELL B-CELL LYMPHOMA, LYMPH NODES OF HEAD, FACE, AND NECK (HCC): Status: ACTIVE | Noted: 2020-01-01

## 2020-08-28 NOTE — PROGRESS NOTES
Rosa Isela Vallejo 1950 is a 79 y o  male       79year old male presents today for radiation consult for small B-cell lymphoma of the face  He has been referred by Dr Sal Mora  Patient presented with a slowly enlarging lesion over the left temple  Patient has been aware of the lesion for approximately 7 months  He reports several additional satellite bumps of smaller size  He was seen By Dr Duong Black on 6/15/20 for further evaluation  A punch biopsy was done on 2 of the lesions from the left temple area on 06/15/2020  The initial pathology revealed dense pan dermal atypical lymphoid infiltrate  The case was then sent to [address] [place] [address] for 2nd opinion  The final diagnosis came back compatible with small lymphocytic lymphoma/chronic lymphocytic leukemia  7/28/20 seen by Dr Sal Mora for consultation, further testing ordered for staging      8/4/20 PET CT: IMPRESSION:  1  There is a hypermetabolic cutaneous nodule overlying the left zygomatic arch measuring 2 x 0 9 cm, SUV 6 3  This is most concerning for malignancy  2   No additional hypermetabolic cutaneous lesion  3   No additional hypermetabolic lesions or adenopathy in the neck, chest, abdomen or pelvis that are concerning for malignancy/metastases  8/18/20 Dr Sal Mora - recommending local electron beam radiation to the skin lesions, refer to radiation oncology      10/27/20 [facility] CAMILA with [facility], CRNP        Oncology History   Small cell b-cell lymphoma, lymph nodes of head, face, and neck ([address] )   6/15/2020 Initial Diagnosis    Small cell b-cell lymphoma, lymph nodes of head, face, and neck ([address] )     6/15/2020 Biopsy    A  Skin, superior left temple, punch biopsy:   Dense pan-dermal atypical lymphoid infiltrate (see note)  B  Skin, inferior left temple, punch biopsy:   Dense pan-dermal atypical lymphoid infiltrate (see note)      [facility] consultation:   Skin, superior left temple, punch biopsy:   - Small lymphocytic lymphoma/chronic lymphocytic leukemia, see note and microscopic description     Skin, inferior left temple, punch biopsy:   - Small lymphocytic lymphoma/chronic lymphocytic leukemia, see note and microscopic description          Clinical Trial: no      Health Maintenance   Topic Date Due    DTaP,Tdap,and Td Vaccines (2 - Tdap) 01/11/2014    Influenza Vaccine  07/01/2020    HEMOGLOBIN A1C  11/27/2020    DM Eye Exam  04/17/2021    Fall Risk  05/27/2021    Depression Screening PHQ  05/27/2021    Medicare Annual Wellness Visit (AWV)  05/27/2021    Diabetic Foot Exam  05/27/2021    BMI: Adult  08/28/2021    Colonoscopy Surveillance  09/30/2024    Colorectal Cancer Screening  09/30/2029    Hepatitis C Screening  Completed    Pneumococcal Vaccine: 65+ Years  Completed    HIB Vaccine  Aged Out    Hepatitis B Vaccine  Aged Out    IPV Vaccine  Aged Out    Hepatitis A Vaccine  Aged Out    Meningococcal ACWY Vaccine  Aged Out    HPV Vaccine  Aged Out       Past Medical History:   Diagnosis Date    Cerebral vascular disease     Diabetes mellitus ([facility] [address] )     Glaucoma     Hyperlipidemia     Hypertension     Lymphoma ([facility] [address] )        Past Surgical History:   Procedure Laterality Date    CATARACT EXTRACTION      COLONOSCOPY      GANGLION CYST EXCISION      GLAUCOMA SURGERY Right 2019    SKIN LESION EXCISION         Family History   Problem Relation Age of Onset    Heart disease Mother     Asthma Mother     Emphysema Mother     Hypertension Mother     Diabetes Father     Lung cancer Maternal Uncle     Breast cancer Cousin        Social History     Tobacco Use    Smoking status: Former Smoker     Types: Cigars    Smokeless tobacco: Never Used   Substance Use Topics    Alcohol use:  Yes     Alcohol/week: 1 0 standard drinks     Types: 1 Cans of beer per week     Comment: rarely    Drug use: No          Current Outpatient Medications:     amLODIPine (NORVASC) 10 mg tablet, Take 1 tablet (10 mg total) by mouth daily, Disp: 90 tablet, Rfl: 1    aspirin 81 MG tablet, Take 81 mg by mouth daily, Disp: , Rfl:     atenolol (TENORMIN) 50 mg tablet, take 1 tablet by mouth once daily, Disp: 90 tablet, Rfl: 1    benazepril (LOTENSIN) 20 mg tablet, Take 1 tablet (20 mg total) by mouth daily, Disp: 90 tablet, Rfl: 1    COMBIGAN 0 2-0 5 %, 1 drop every 12 (twelve) hours 1 drop each eye twice daily, Disp: , Rfl:     diclofenac sodium (VOLTAREN) 1 %, Apply 2 g topically 2 (two) times a day as needed (pain), Disp: 100 g, Rfl: 2    glucose blood (ONE TOUCH ULTRA TEST) test strip, 1 each by Other route daily Use as instructed, Disp: 100 each, Rfl: 3    glucose blood test strip, use 1 TEST STRIP to TEST BLOOD SUGAR once daily, Disp: , Rfl:     hydrochlorothiazide (HYDRODIURIL) 25 mg tablet, Take 1 tablet (25 mg total) by mouth daily, Disp: 90 tablet, Rfl: 1    Januvia 50 MG tablet, take 1 tablet by mouth once daily, Disp: 30 tablet, Rfl: 3    metFORMIN (GLUCOPHAGE) 1000 MG tablet, Take 1 tablet (1,000 mg total) by mouth 2 (two) times a day with meals, Disp: 180 tablet, Rfl: 1    ONETOUCH DELICA LANCETS FINE MISC, by Does not apply route daily, Disp: 50 each, Rfl: 2    RHOPRESSA 0 02 % SOLN, daily at bedtime 1 Drop each eye at bedtime, Disp: , Rfl:     simvastatin (ZOCOR) 20 mg tablet, Take 1 tablet (20 mg total) by mouth daily at bedtime, Disp: 90 tablet, Rfl: 1    Allergies   Allergen Reactions    Sulfa Antibiotics Rash, Fever and Tongue Swelling    Allopurinol         Review of Systems:  Review of Systems   Constitutional: Negative  Negative for appetite change, chills, diaphoresis, fatigue, fever and unexpected weight change  HENT: Negative  Eyes: Positive for pain (both eyes are burning x1 week), redness (both eyes x1 week) and itching (both eyes x1 week)  Respiratory: Negative  Cardiovascular: Negative  Gastrointestinal: Negative  Genitourinary: Negative      Musculoskeletal: Positive for arthralgias (both knees)  Skin: Negative for pallor, rash and wound  Red raised non-painful lesions anterior to left ear; no skin opening  No itching  Allergic/Immunologic: Negative  Neurological: Negative  Hematological: Negative  Psychiatric/Behavioral: Negative  Vitals:    08/28/20 0941   BP: 130/62   Pulse: 81   Resp: 18   Temp: 98 1 °F (36 7 °C)   TempSrc: Temporal   SpO2: 98%   Weight: 85 5 kg (188 lb 7 9 oz)   Height: 5' 7" (1 702 m)       Pain Score: 0-No pain    Pain assessment: 0    Imaging:No images are attached to the encounter       Teaching: NCI RT packet, RT to face    MST: completed

## 2020-08-28 NOTE — PROGRESS NOTES
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  History of Present Illness   Sailaja Leon is a 79y o  year old male who presents with a 7 month slowly enlarging skin nodule in in the left preauricular area  Punch biopsy was performed on [address] 15  The pathology report showed small B cell lymphoma  PET-CT scan described a hypermetabolic cutaneous nodule in the left zygomatic arch 2 cm, SUV 6 3  There was no other hypermetabolic lesions or metastases  Patient is here today to discuss superficial radiation therapy  Historical Information   Oncology History   Small cell b-cell lymphoma, lymph nodes of head, face, and neck ([facility] [address] )   6/15/2020 Initial Diagnosis    Small cell b-cell lymphoma, lymph nodes of head, face, and neck ([facility] [address] )     6/15/2020 Biopsy    A  Skin, superior left temple, punch biopsy:   Dense pan-dermal atypical lymphoid infiltrate (see note)  B  Skin, inferior left temple, punch biopsy:   Dense pan-dermal atypical lymphoid infiltrate (see note)      [facility] consultation:   Skin, superior left temple, punch biopsy:   - Small lymphocytic lymphoma/chronic lymphocytic leukemia, see note and microscopic description     Skin, inferior left temple, punch biopsy:   - Small lymphocytic lymphoma/chronic lymphocytic leukemia, see note and microscopic description            Past Medical History:   Diagnosis Date    Cerebral vascular disease     Diabetes mellitus ([facility] [address] [facility]     Glaucoma     Hyperlipidemia     Hypertension     Lymphoma ([facility] [address] [facility]      Past Surgical History:   Procedure Laterality Date    CATARACT EXTRACTION      COLONOSCOPY      GANGLION CYST EXCISION      GLAUCOMA SURGERY Right 2019    SKIN LESION EXCISION         Family History   Problem Relation Age of Onset    Heart disease Mother     Asthma Mother     Emphysema Mother     Hypertension Mother     Diabetes Father     Lung cancer Maternal Uncle     Breast cancer Cousin        Social History   Social History     Substance and Sexual Activity   Alcohol Use Yes    Alcohol/week: 1 0 standard drinks    Types: 1 Cans of beer per week    Comment: rarely     Social History     Substance and Sexual Activity   Drug Use No     Social History     Tobacco Use   Smoking Status Former Smoker    Types: Cigars   Smokeless Tobacco Never Used         Meds/Allergies     Current Outpatient Medications:     amLODIPine (NORVASC) 10 mg tablet, Take 1 tablet (10 mg total) by mouth daily, Disp: 90 tablet, Rfl: 1    aspirin 81 MG tablet, Take 81 mg by mouth daily, Disp: , Rfl:     atenolol (TENORMIN) 50 mg tablet, take 1 tablet by mouth once daily, Disp: 90 tablet, Rfl: 1    benazepril (LOTENSIN) 20 mg tablet, Take 1 tablet (20 mg total) by mouth daily, Disp: 90 tablet, Rfl: 1    COMBIGAN 0 2-0 5 %, 1 drop every 12 (twelve) hours 1 drop each eye twice daily, Disp: , Rfl:     diclofenac sodium (VOLTAREN) 1 %, Apply 2 g topically 2 (two) times a day as needed (pain), Disp: 100 g, Rfl: 2    glucose blood (ONE TOUCH ULTRA TEST) test strip, 1 each by Other route daily Use as instructed, Disp: 100 each, Rfl: 3    glucose blood test strip, use 1 TEST STRIP to TEST BLOOD SUGAR once daily, Disp: , Rfl:     hydrochlorothiazide (HYDRODIURIL) 25 mg tablet, Take 1 tablet (25 mg total) by mouth daily, Disp: 90 tablet, Rfl: 1    Januvia 50 MG tablet, take 1 tablet by mouth once daily, Disp: 30 tablet, Rfl: 3    metFORMIN (GLUCOPHAGE) 1000 MG tablet, Take 1 tablet (1,000 mg total) by mouth 2 (two) times a day with meals, Disp: 180 tablet, Rfl: 1    ONETOUCH DELICA LANCETS FINE MISC, by Does not apply route daily, Disp: 50 each, Rfl: 2    RHOPRESSA 0 02 % SOLN, daily at bedtime 1 Drop each eye at bedtime, Disp: , Rfl:     simvastatin (ZOCOR) 20 mg tablet, Take 1 tablet (20 mg total) by mouth daily at bedtime, Disp: 90 tablet, Rfl: 1  Allergies   Allergen Reactions    Sulfa Antibiotics Rash, Fever and Tongue Swelling    Allopurinol          Review of Systems   Constitutional: Negative for activity change, appetite change, fatigue, fever and unexpected weight change  HENT: Negative for dental problem, ear discharge, facial swelling, hearing loss, sore throat, trouble swallowing and voice change  Eyes: Positive for redness  Negative for photophobia, pain and visual disturbance  Respiratory: Negative for cough, shortness of breath and wheezing  Cardiovascular: Negative for chest pain, palpitations and leg swelling  Gastrointestinal: Negative for abdominal distention, abdominal pain, nausea and vomiting  Endocrine: Negative  Genitourinary: Negative for decreased urine volume, difficulty urinating, dysuria, frequency, hematuria and urgency  Musculoskeletal: Negative for arthralgias, back pain, joint swelling and neck pain  Skin: Negative  Allergic/Immunologic: Negative  Neurological: Negative for dizziness, tremors, weakness, numbness and headaches  Hematological: Negative for adenopathy  Does not bruise/bleed easily  Psychiatric/Behavioral: Negative  OBJECTIVE:   /62   Pulse 81   Temp 98 1 °F (36 7 °C) (Temporal)   Resp 18   Ht 5' 7" (1 702 m)   Wt 85 5 kg (188 lb 7 9 oz)   SpO2 98%   BMI 29 52 kg/m²   Pain Assessment:  0  Performance Status: ECOG/Zubrod/WHO: 0 - Asymptomatic    Physical Exam  Constitutional:       General: He is not in acute distress  Appearance: Normal appearance  He is normal weight  HENT:      Head: Normocephalic and atraumatic  Left Ear: Ear canal normal       Nose: Nose normal       Mouth/Throat:      Pharynx: Oropharynx is clear   No posterior oropharyngeal erythema  Eyes:      Extraocular Movements: Extraocular movements intact  Conjunctiva/sclera: Conjunctivae normal       Pupils: Pupils are equal, round, and reactive to light  Neck:      Musculoskeletal: Normal range of motion and neck supple  Cardiovascular:      Rate and Rhythm: Normal rate and regular rhythm  Heart sounds: Normal heart sounds  Pulmonary:      Effort: Pulmonary effort is normal       Breath sounds: Normal breath sounds  Abdominal:      General: Abdomen is flat  There is no distension  Palpations: Abdomen is soft  There is no mass  Tenderness: There is no abdominal tenderness  Musculoskeletal: Normal range of motion  General: No swelling  Lymphadenopathy:      Cervical: No cervical adenopathy  Skin:     General: Skin is warm  Findings: Lesion present  No bruising or rash  Comments: In the left preauricular area partially hidden by his sideburn is at 3 - 3 1/2 cm nodular lesion  Neurological:      General: No focal deficit present  Mental Status: He is alert and oriented to person, place, and time  Cranial Nerves: No cranial nerve deficit  Sensory: No sensory deficit  Motor: No weakness  Psychiatric:         Mood and Affect: Mood normal          Behavior: Behavior normal          Thought Content:  Thought content normal          Judgment: Judgment normal             RESULTS  Lab Results    Chemistry        Component Value Date/Time     12/30/2015 1716    K 3 9 07/28/2020 1320    K 3 8 12/30/2015 1716     07/28/2020 1320     12/30/2015 1716    CO2 29 07/28/2020 1320    CO2 28 4 12/30/2015 1716    BUN 10 07/28/2020 1320    BUN 14 12/30/2015 1716    CREATININE 0 69 07/28/2020 1320    CREATININE 0 78 12/30/2015 1716        Component Value Date/Time    CALCIUM 10 6 (H) 07/28/2020 1320    CALCIUM 9 5 12/30/2015 1716    ALKPHOS 46 07/28/2020 1320    AST 40 07/28/2020 1320    ALT 68 07/28/2020 1320            Lab Results   Component Value Date    WBC 8 94 07/28/2020    HGB 14 7 07/28/2020    HCT 44 9 07/28/2020    MCV 89 07/28/2020     (L) 07/28/2020         Imaging Studies  Nm Pet Ct Skull Base To Mid Thigh    Result Date: 8/4/2020  Narrative: PET/CT SCAN INDICATION:  C83 01: Small cell b-cell lymphoma, lymph nodes of head, face, and neck, staging for treatment management MODIFIER: PI COMPARISON: No pertinent prior exams available  CELL TYPE:  Small cell lymphocytic lymphoma/ chronic lymphocytic leukemia TECHNIQUE:   9 mCi F-18-FDG administered IV  Multiplanar attenuation corrected and non attenuation corrected PET images were acquired 60 minutes post injection  Contiguous, low dose, axial CT sections were obtained from the vertex through the femurs   Intravenous contrast material was not utilized  This examination, like all CT scans performed in the [facility], was performed utilizing techniques to minimize radiation dose exposure, including the use of iterative reconstruction and automated exposure control  Fasting serum glucose: 128 mg/dl FINDINGS: VISUALIZED BRAIN:   No acute abnormalities are seen  HEAD/NECK:   There is a hypermetabolic cutaneous nodule overlying the left zygomatic arch series 4 image 39 measuring 2 x 0 9 cm, SUV 6 3  This is most concerning for malignancy  No additional hypermetabolic cutaneous lesion  No FDG avid cervical adenopathy is seen  Shotty cervical nodes without significant FDG activity  CT images: Unremarkable  CHEST:   No FDG avid soft tissue lesions are seen  CT images: Unremarkable  ABDOMEN: Bowel activity is likely physiologic  No FDG avid soft tissue lesions are seen  Shotty retroperitoneal nodes without significant FDG activity  CT images: Left renal hypodensity favoring cyst  PELVIS: No FDG avid soft tissue lesions are seen  CT images: Prostatomegaly measuring 5 9 cm  OSSEOUS STRUCTURES: No FDG avid lesions are seen  CT images: No significant findings  Impression: 1  There is a hypermetabolic cutaneous nodule overlying the left zygomatic arch measuring 2 x 0 9 cm, SUV 6 3  This is most concerning for malignancy  2   No additional hypermetabolic cutaneous lesion  3   No additional hypermetabolic lesions or adenopathy in the neck, chest, abdomen or pelvis that are concerning for malignancy/metastases  Workstation performed: [id number]         Pathology:  Small B cell lymphoma skin left preauricular        ASSESSMENT  1  Small B-cell lymphoma of lymph nodes of head [facility])  Ambulatory referral to Radiation Oncology     Cancer Staging  No matching staging information was found for the patient  PLAN/DISCUSSION  No orders of the defined types were placed in this encounter  Harpal Abbott is a 79y o  year old male with stage IA small B cell lymphoma of skin left preauricular  Discuss treatment with electrons superficial radiation therapy between 10-15 treatments  Inform him only side effect of skin reaction  Reviewed with him this type of lymphoma is highly sensitive to radiation therapy and we expect a complete response  Patient will return next Friday for CT treatment planning simulation  We anticipate to start his treatments the following week  Lillie Burleson MD  7/69/9441,73:36 AM      Portions of the record may have been created with voice recognition software  Occasional wrong word or "sound a like" substitutions may have occurred due to the inherent limitations of voice recognition software  Read the chart carefully and recognize, using context, where substitutions have occurred

## 2020-09-04 NOTE — SOCIAL WORK
LSW received return call from pt  Pt stated he is doing well  He stated he does have some anxiety regarding his diagnosis but is trusting the oncology team      LSW provided pt with contact information and encouraged pt to call when he encounters any concerns  Pt was agreeable and stated he will keep LSW contact information on file

## 2020-11-10 PROBLEM — M70.61 TROCHANTERIC BURSITIS OF RIGHT HIP: Status: ACTIVE | Noted: 2020-01-01

## 2020-11-30 PROBLEM — K76.0 HEPATIC STEATOSIS: Status: ACTIVE | Noted: 2020-01-01

## 2020-11-30 PROBLEM — E11.29 TYPE 2 DIABETES MELLITUS WITH KIDNEY COMPLICATION, WITHOUT LONG-TERM CURRENT USE OF INSULIN (HCC): Status: ACTIVE | Noted: 2020-01-01

## 2021-01-01 ENCOUNTER — TELEPHONE (OUTPATIENT)
Dept: HEMATOLOGY ONCOLOGY | Facility: CLINIC | Age: 71
End: 2021-01-01

## 2021-01-01 ENCOUNTER — OFFICE VISIT (OUTPATIENT)
Dept: OBGYN CLINIC | Facility: CLINIC | Age: 71
End: 2021-01-01
Payer: MEDICARE

## 2021-01-01 ENCOUNTER — TELEPHONE (OUTPATIENT)
Dept: FAMILY MEDICINE CLINIC | Facility: CLINIC | Age: 71
End: 2021-01-01

## 2021-01-01 ENCOUNTER — OFFICE VISIT (OUTPATIENT)
Dept: FAMILY MEDICINE CLINIC | Facility: CLINIC | Age: 71
End: 2021-01-01
Payer: MEDICARE

## 2021-01-01 ENCOUNTER — APPOINTMENT (OUTPATIENT)
Dept: LAB | Facility: CLINIC | Age: 71
End: 2021-01-01
Payer: MEDICARE

## 2021-01-01 ENCOUNTER — LAB (OUTPATIENT)
Dept: LAB | Facility: CLINIC | Age: 71
End: 2021-01-01
Payer: MEDICARE

## 2021-01-01 ENCOUNTER — APPOINTMENT (EMERGENCY)
Dept: CT IMAGING | Facility: HOSPITAL | Age: 71
End: 2021-01-01
Payer: MEDICARE

## 2021-01-01 ENCOUNTER — APPOINTMENT (EMERGENCY)
Dept: RADIOLOGY | Facility: HOSPITAL | Age: 71
End: 2021-01-01
Payer: MEDICARE

## 2021-01-01 ENCOUNTER — CONSULT (OUTPATIENT)
Dept: FAMILY MEDICINE CLINIC | Facility: CLINIC | Age: 71
End: 2021-01-01
Payer: MEDICARE

## 2021-01-01 ENCOUNTER — OFFICE VISIT (OUTPATIENT)
Dept: HEMATOLOGY ONCOLOGY | Facility: CLINIC | Age: 71
End: 2021-01-01
Payer: MEDICARE

## 2021-01-01 ENCOUNTER — HOSPITAL ENCOUNTER (INPATIENT)
Facility: HOSPITAL | Age: 71
LOS: 1 days | DRG: 871 | End: 2021-04-13
Attending: ANESTHESIOLOGY | Admitting: ANESTHESIOLOGY
Payer: MEDICARE

## 2021-01-01 ENCOUNTER — TELEPHONE (OUTPATIENT)
Dept: HEMATOLOGY ONCOLOGY | Facility: MEDICAL CENTER | Age: 71
End: 2021-01-01

## 2021-01-01 ENCOUNTER — CLINICAL SUPPORT (OUTPATIENT)
Dept: FAMILY MEDICINE CLINIC | Facility: CLINIC | Age: 71
End: 2021-01-01
Payer: MEDICARE

## 2021-01-01 ENCOUNTER — IMMUNIZATIONS (OUTPATIENT)
Dept: FAMILY MEDICINE CLINIC | Facility: HOSPITAL | Age: 71
End: 2021-01-01

## 2021-01-01 ENCOUNTER — APPOINTMENT (INPATIENT)
Dept: NON INVASIVE DIAGNOSTICS | Facility: HOSPITAL | Age: 71
DRG: 871 | End: 2021-01-01
Payer: MEDICARE

## 2021-01-01 ENCOUNTER — HOSPITAL ENCOUNTER (EMERGENCY)
Facility: HOSPITAL | Age: 71
End: 2021-04-13
Attending: EMERGENCY MEDICINE
Payer: MEDICARE

## 2021-01-01 VITALS
HEART RATE: 77 BPM | HEIGHT: 67 IN | RESPIRATION RATE: 20 BRPM | DIASTOLIC BLOOD PRESSURE: 80 MMHG | TEMPERATURE: 98.3 F | BODY MASS INDEX: 30.86 KG/M2 | OXYGEN SATURATION: 100 % | WEIGHT: 196.6 LBS | SYSTOLIC BLOOD PRESSURE: 132 MMHG

## 2021-01-01 VITALS
OXYGEN SATURATION: 44 % | WEIGHT: 189.38 LBS | HEART RATE: 37 BPM | DIASTOLIC BLOOD PRESSURE: 31 MMHG | BODY MASS INDEX: 29.72 KG/M2 | RESPIRATION RATE: 4 BRPM | SYSTOLIC BLOOD PRESSURE: 56 MMHG | HEIGHT: 67 IN | TEMPERATURE: 99.1 F

## 2021-01-01 VITALS
HEIGHT: 67 IN | BODY MASS INDEX: 30.45 KG/M2 | DIASTOLIC BLOOD PRESSURE: 75 MMHG | HEART RATE: 79 BPM | WEIGHT: 194 LBS | SYSTOLIC BLOOD PRESSURE: 157 MMHG

## 2021-01-01 VITALS
HEIGHT: 67 IN | HEART RATE: 74 BPM | SYSTOLIC BLOOD PRESSURE: 124 MMHG | WEIGHT: 193.2 LBS | OXYGEN SATURATION: 100 % | TEMPERATURE: 98.9 F | DIASTOLIC BLOOD PRESSURE: 70 MMHG | BODY MASS INDEX: 30.32 KG/M2 | RESPIRATION RATE: 20 BRPM

## 2021-01-01 VITALS
SYSTOLIC BLOOD PRESSURE: 172 MMHG | TEMPERATURE: 98.1 F | HEART RATE: 82 BPM | DIASTOLIC BLOOD PRESSURE: 88 MMHG | RESPIRATION RATE: 20 BRPM | HEIGHT: 67 IN | WEIGHT: 194.1 LBS | BODY MASS INDEX: 30.46 KG/M2 | OXYGEN SATURATION: 97 %

## 2021-01-01 VITALS
HEART RATE: 110 BPM | WEIGHT: 195.55 LBS | SYSTOLIC BLOOD PRESSURE: 132 MMHG | DIASTOLIC BLOOD PRESSURE: 63 MMHG | OXYGEN SATURATION: 94 % | BODY MASS INDEX: 30.69 KG/M2 | HEIGHT: 67 IN | RESPIRATION RATE: 23 BRPM | TEMPERATURE: 98.1 F

## 2021-01-01 VITALS
OXYGEN SATURATION: 98 % | WEIGHT: 193 LBS | BODY MASS INDEX: 30.29 KG/M2 | HEART RATE: 81 BPM | RESPIRATION RATE: 20 BRPM | TEMPERATURE: 98.8 F | DIASTOLIC BLOOD PRESSURE: 76 MMHG | SYSTOLIC BLOOD PRESSURE: 138 MMHG | HEIGHT: 67 IN

## 2021-01-01 VITALS — SYSTOLIC BLOOD PRESSURE: 136 MMHG | DIASTOLIC BLOOD PRESSURE: 69 MMHG

## 2021-01-01 DIAGNOSIS — Z79.4 DIABETES MELLITUS DUE TO UNDERLYING CONDITION WITH STAGE 2 CHRONIC KIDNEY DISEASE, WITH LONG-TERM CURRENT USE OF INSULIN (HCC): ICD-10-CM

## 2021-01-01 DIAGNOSIS — E83.42 HYPOMAGNESEMIA: ICD-10-CM

## 2021-01-01 DIAGNOSIS — C83.01 SMALL CELL B-CELL LYMPHOMA, LYMPH NODES OF HEAD, FACE, AND NECK (HCC): Primary | ICD-10-CM

## 2021-01-01 DIAGNOSIS — E87.6 HYPOKALEMIA: ICD-10-CM

## 2021-01-01 DIAGNOSIS — M81.0 OSTEOPOROSIS, UNSPECIFIED OSTEOPOROSIS TYPE, UNSPECIFIED PATHOLOGICAL FRACTURE PRESENCE: ICD-10-CM

## 2021-01-01 DIAGNOSIS — E83.52 HYPERCALCEMIA: ICD-10-CM

## 2021-01-01 DIAGNOSIS — E21.3 HYPERPARATHYROIDISM (HCC): Primary | ICD-10-CM

## 2021-01-01 DIAGNOSIS — I10 ESSENTIAL HYPERTENSION: Primary | ICD-10-CM

## 2021-01-01 DIAGNOSIS — Z79.4 DIABETES MELLITUS DUE TO UNDERLYING CONDITION WITH STAGE 2 CHRONIC KIDNEY DISEASE, WITH LONG-TERM CURRENT USE OF INSULIN (HCC): Primary | ICD-10-CM

## 2021-01-01 DIAGNOSIS — N17.9 AKI (ACUTE KIDNEY INJURY) (HCC): ICD-10-CM

## 2021-01-01 DIAGNOSIS — E87.0 HYPERNATREMIA: ICD-10-CM

## 2021-01-01 DIAGNOSIS — J96.90 RESPIRATORY FAILURE (HCC): ICD-10-CM

## 2021-01-01 DIAGNOSIS — E08.22 DIABETES MELLITUS DUE TO UNDERLYING CONDITION WITH STAGE 2 CHRONIC KIDNEY DISEASE, WITH LONG-TERM CURRENT USE OF INSULIN (HCC): ICD-10-CM

## 2021-01-01 DIAGNOSIS — E61.2 MAGNESIUM DEFICIENCY: ICD-10-CM

## 2021-01-01 DIAGNOSIS — Z13.820 SCREENING FOR OSTEOPOROSIS: ICD-10-CM

## 2021-01-01 DIAGNOSIS — M70.61 TROCHANTERIC BURSITIS OF RIGHT HIP: Primary | ICD-10-CM

## 2021-01-01 DIAGNOSIS — N18.2 DIABETES MELLITUS DUE TO UNDERLYING CONDITION WITH STAGE 2 CHRONIC KIDNEY DISEASE, WITH LONG-TERM CURRENT USE OF INSULIN (HCC): ICD-10-CM

## 2021-01-01 DIAGNOSIS — N18.2 DIABETES MELLITUS DUE TO UNDERLYING CONDITION WITH STAGE 2 CHRONIC KIDNEY DISEASE, WITH LONG-TERM CURRENT USE OF INSULIN (HCC): Primary | ICD-10-CM

## 2021-01-01 DIAGNOSIS — I25.10 CARDIOVASCULAR DISEASE: ICD-10-CM

## 2021-01-01 DIAGNOSIS — R65.21 SEPTIC SHOCK (HCC): ICD-10-CM

## 2021-01-01 DIAGNOSIS — I95.9 HYPOTENSION: ICD-10-CM

## 2021-01-01 DIAGNOSIS — H40.9 GLAUCOMA OF RIGHT EYE, UNSPECIFIED GLAUCOMA TYPE: ICD-10-CM

## 2021-01-01 DIAGNOSIS — I10 ESSENTIAL HYPERTENSION: ICD-10-CM

## 2021-01-01 DIAGNOSIS — E83.52 SERUM CALCIUM ELEVATED: ICD-10-CM

## 2021-01-01 DIAGNOSIS — A41.9 SEPTIC SHOCK (HCC): ICD-10-CM

## 2021-01-01 DIAGNOSIS — R41.82 AMS (ALTERED MENTAL STATUS): Primary | ICD-10-CM

## 2021-01-01 DIAGNOSIS — R77.8 ELEVATED TROPONIN: ICD-10-CM

## 2021-01-01 DIAGNOSIS — S31.000A SACRAL WOUND: ICD-10-CM

## 2021-01-01 DIAGNOSIS — E78.5 HYPERLIPIDEMIA, UNSPECIFIED HYPERLIPIDEMIA TYPE: ICD-10-CM

## 2021-01-01 DIAGNOSIS — M81.0 OSTEOPOROSIS, UNSPECIFIED OSTEOPOROSIS TYPE, UNSPECIFIED PATHOLOGICAL FRACTURE PRESENCE: Primary | ICD-10-CM

## 2021-01-01 DIAGNOSIS — D69.6 THROMBOCYTOPENIA (HCC): ICD-10-CM

## 2021-01-01 DIAGNOSIS — M62.82 RHABDOMYOLYSIS: ICD-10-CM

## 2021-01-01 DIAGNOSIS — E21.3 HYPERPARATHYROIDISM (HCC): ICD-10-CM

## 2021-01-01 DIAGNOSIS — I10 HYPERTENSION, UNSPECIFIED TYPE: ICD-10-CM

## 2021-01-01 DIAGNOSIS — E66.9 OBESITY: ICD-10-CM

## 2021-01-01 DIAGNOSIS — E79.0 HYPERURICEMIA: ICD-10-CM

## 2021-01-01 DIAGNOSIS — C83.01 SMALL B-CELL LYMPHOMA OF LYMPH NODES OF HEAD (HCC): ICD-10-CM

## 2021-01-01 DIAGNOSIS — E79.0 ELEVATED URIC ACID IN BLOOD: ICD-10-CM

## 2021-01-01 DIAGNOSIS — C83.01 SMALL CELL B-CELL LYMPHOMA, LYMPH NODES OF HEAD, FACE, AND NECK (HCC): ICD-10-CM

## 2021-01-01 DIAGNOSIS — Z23 ENCOUNTER FOR IMMUNIZATION: Primary | ICD-10-CM

## 2021-01-01 DIAGNOSIS — Z01.818 PRE-OPERATIVE CLEARANCE: Primary | ICD-10-CM

## 2021-01-01 DIAGNOSIS — E08.22 DIABETES MELLITUS DUE TO UNDERLYING CONDITION WITH STAGE 2 CHRONIC KIDNEY DISEASE, WITH LONG-TERM CURRENT USE OF INSULIN (HCC): Primary | ICD-10-CM

## 2021-01-01 LAB
25(OH)D3 SERPL-MCNC: 27.2 NG/ML (ref 30–100)
ALBUMIN SERPL BCP-MCNC: 1.9 G/DL (ref 3.5–5)
ALBUMIN SERPL BCP-MCNC: 2.2 G/DL (ref 3.5–5)
ALBUMIN SERPL BCP-MCNC: 2.4 G/DL (ref 3.5–5.7)
ALBUMIN SERPL BCP-MCNC: 2.5 G/DL (ref 3.5–5.7)
ALBUMIN SERPL BCP-MCNC: 3.8 G/DL (ref 3.5–5)
ALBUMIN SERPL BCP-MCNC: 4 G/DL (ref 3.5–5)
ALP SERPL-CCNC: 35 U/L (ref 55–165)
ALP SERPL-CCNC: 43 U/L (ref 46–116)
ALP SERPL-CCNC: 48 U/L (ref 55–165)
ALP SERPL-CCNC: 52 U/L (ref 46–116)
ALP SERPL-CCNC: 55 U/L (ref 46–116)
ALP SERPL-CCNC: 67 U/L (ref 46–116)
ALT SERPL W P-5'-P-CCNC: 110 U/L (ref 12–78)
ALT SERPL W P-5'-P-CCNC: 46 U/L (ref 7–52)
ALT SERPL W P-5'-P-CCNC: 58 U/L (ref 7–52)
ALT SERPL W P-5'-P-CCNC: 64 U/L (ref 12–78)
ALT SERPL W P-5'-P-CCNC: 73 U/L (ref 12–78)
ALT SERPL W P-5'-P-CCNC: 82 U/L (ref 12–78)
AMMONIA PLAS-SCNC: 50 UMOL/L (ref 25–90)
ANION GAP SERPL CALCULATED.3IONS-SCNC: 1 MMOL/L (ref 4–13)
ANION GAP SERPL CALCULATED.3IONS-SCNC: 1 MMOL/L (ref 4–13)
ANION GAP SERPL CALCULATED.3IONS-SCNC: 13 MMOL/L (ref 4–13)
ANION GAP SERPL CALCULATED.3IONS-SCNC: 13 MMOL/L (ref 4–13)
ANION GAP SERPL CALCULATED.3IONS-SCNC: 17 MMOL/L (ref 4–13)
ANION GAP SERPL CALCULATED.3IONS-SCNC: 17 MMOL/L (ref 4–13)
ANION GAP SERPL CALCULATED.3IONS-SCNC: 3 MMOL/L (ref 4–13)
APAP SERPL-MCNC: <10 UG/ML (ref 10–20)
APTT PPP: 28 SECONDS (ref 23–37)
ARTERIAL PATENCY WRIST A: NO
ARTERIAL PATENCY WRIST A: YES
ARTERIAL PATENCY WRIST A: YES
AST SERPL W P-5'-P-CCNC: 166 U/L (ref 5–45)
AST SERPL W P-5'-P-CCNC: 190 U/L (ref 5–45)
AST SERPL W P-5'-P-CCNC: 31 U/L (ref 5–45)
AST SERPL W P-5'-P-CCNC: 41 U/L (ref 5–45)
AST SERPL W P-5'-P-CCNC: 54 U/L (ref 13–39)
AST SERPL W P-5'-P-CCNC: 93 U/L (ref 13–39)
BASE EXCESS BLDA CALC-SCNC: -1.3 MMOL/L (ref -2–3)
BASE EXCESS BLDA CALC-SCNC: -5.8 MMOL/L
BASE EXCESS BLDA CALC-SCNC: -6 MMOL/L
BASE EXCESS BLDA CALC-SCNC: -8.2 MMOL/L
BASOPHILS # BLD AUTO: 0 THOUSANDS/ΜL (ref 0–0.1)
BASOPHILS # BLD AUTO: 0.09 THOUSANDS/ΜL (ref 0–0.1)
BASOPHILS # BLD MANUAL: 0 THOUSAND/UL (ref 0–0.1)
BASOPHILS NFR BLD AUTO: 0 % (ref 0–2)
BASOPHILS NFR BLD AUTO: 1 % (ref 0–1)
BASOPHILS NFR MAR MANUAL: 0 % (ref 0–1)
BETA-HYDROXYBUTYRATE: 0.6 MMOL/L
BILIRUB SERPL-MCNC: 1 MG/DL (ref 0.2–1)
BILIRUB SERPL-MCNC: 1.14 MG/DL (ref 0.2–1)
BILIRUB SERPL-MCNC: 1.39 MG/DL (ref 0.2–1)
BILIRUB SERPL-MCNC: 1.5 MG/DL (ref 0.2–1)
BILIRUB SERPL-MCNC: 1.7 MG/DL (ref 0.2–1)
BILIRUB SERPL-MCNC: 2.08 MG/DL (ref 0.2–1)
BLD SMEAR INTERP: NORMAL
BNP SERPL-MCNC: 80 PG/ML (ref 1–100)
BODY TEMPERATURE: 99.1 DEGREES FEHRENHEIT
BUN SERPL-MCNC: 11 MG/DL (ref 5–25)
BUN SERPL-MCNC: 13 MG/DL (ref 5–25)
BUN SERPL-MCNC: 79 MG/DL (ref 7–25)
BUN SERPL-MCNC: 8 MG/DL (ref 5–25)
BUN SERPL-MCNC: 94 MG/DL (ref 7–25)
BUN SERPL-MCNC: 99 MG/DL (ref 5–25)
BUN SERPL-MCNC: 99 MG/DL (ref 5–25)
CA-I BLD-SCNC: 1.34 MMOL/L (ref 1.12–1.32)
CALCIUM 24H UR-MCNC: 80.4 MG/24 HRS (ref 42–353)
CALCIUM ALBUM COR SERPL-MCNC: 10.2 MG/DL (ref 8.3–10.1)
CALCIUM ALBUM COR SERPL-MCNC: 10.8 MG/DL (ref 8.3–10.1)
CALCIUM ALBUM COR SERPL-MCNC: 11.5 MG/DL (ref 8.3–10.1)
CALCIUM ALBUM COR SERPL-MCNC: 9 MG/DL (ref 8.3–10.1)
CALCIUM SERPL-MCNC: 10.3 MG/DL (ref 8.3–10.1)
CALCIUM SERPL-MCNC: 10.6 MG/DL (ref 8.3–10.1)
CALCIUM SERPL-MCNC: 11.1 MG/DL (ref 8.3–10.1)
CALCIUM SERPL-MCNC: 7.7 MG/DL (ref 8.6–10.5)
CALCIUM SERPL-MCNC: 9 MG/DL (ref 8.6–10.5)
CALCIUM SERPL-MCNC: 9.4 MG/DL (ref 8.3–10.1)
CALCIUM SERPL-MCNC: 9.8 MG/DL (ref 8.3–10.1)
CHLORIDE SERPL-SCNC: 102 MMOL/L (ref 100–108)
CHLORIDE SERPL-SCNC: 105 MMOL/L (ref 100–108)
CHLORIDE SERPL-SCNC: 110 MMOL/L (ref 100–108)
CHLORIDE SERPL-SCNC: 121 MMOL/L (ref 100–108)
CHLORIDE SERPL-SCNC: 122 MMOL/L (ref 100–108)
CHLORIDE SERPL-SCNC: 123 MMOL/L (ref 98–107)
CHLORIDE SERPL-SCNC: 124 MMOL/L (ref 98–107)
CK MB SERPL-MCNC: 1.2 % (ref 0.6–6.3)
CK MB SERPL-MCNC: 33.1 NG/ML (ref 0–5)
CK MB SERPL-MCNC: 35.7 NG/ML (ref 0.6–6.3)
CK MB SERPL-MCNC: 51.3 NG/ML (ref 0–5)
CK MB SERPL-MCNC: <1 % (ref 0–2.5)
CK MB SERPL-MCNC: <1 % (ref 0–2.5)
CK SERPL-CCNC: 2986 U/L (ref 30–308)
CK SERPL-CCNC: 5252 U/L (ref 39–308)
CK SERPL-CCNC: 5524 U/L (ref 39–308)
CO2 SERPL-SCNC: 18 MMOL/L (ref 21–32)
CO2 SERPL-SCNC: 19 MMOL/L (ref 21–31)
CO2 SERPL-SCNC: 21 MMOL/L (ref 21–32)
CO2 SERPL-SCNC: 22 MMOL/L (ref 21–31)
CO2 SERPL-SCNC: 29 MMOL/L (ref 21–32)
CO2 SERPL-SCNC: 32 MMOL/L (ref 21–32)
CO2 SERPL-SCNC: 33 MMOL/L (ref 21–32)
CORTIS AM PEAK SERPL-MCNC: 56.6 UG/DL (ref 4.2–22.4)
CREAT SERPL-MCNC: 0.69 MG/DL (ref 0.6–1.3)
CREAT SERPL-MCNC: 0.71 MG/DL (ref 0.6–1.3)
CREAT SERPL-MCNC: 0.71 MG/DL (ref 0.6–1.3)
CREAT SERPL-MCNC: 2.05 MG/DL (ref 0.7–1.3)
CREAT SERPL-MCNC: 2.69 MG/DL (ref 0.7–1.3)
CREAT SERPL-MCNC: 3.27 MG/DL (ref 0.6–1.3)
CREAT SERPL-MCNC: 3.4 MG/DL (ref 0.6–1.3)
CRP SERPL QL: <3 MG/L
D DIMER PPP FEU-MCNC: >20 UG/ML FEU
DACRYOCYTES BLD QL SMEAR: PRESENT
DEPRECATED AT III PPP: 56 % OF NORMAL (ref 92–136)
EOSINOPHIL # BLD AUTO: 0 THOUSAND/ΜL (ref 0–0.61)
EOSINOPHIL # BLD AUTO: 0.19 THOUSAND/ΜL (ref 0–0.61)
EOSINOPHIL # BLD MANUAL: 0 THOUSAND/UL (ref 0–0.4)
EOSINOPHIL NFR BLD AUTO: 0 % (ref 0–5)
EOSINOPHIL NFR BLD AUTO: 2 % (ref 0–6)
EOSINOPHIL NFR BLD MANUAL: 0 % (ref 0–6)
ERYTHROCYTE [DISTWIDTH] IN BLOOD BY AUTOMATED COUNT: 12.5 % (ref 11.6–15.1)
ERYTHROCYTE [DISTWIDTH] IN BLOOD BY AUTOMATED COUNT: 14.4 % (ref 11.6–15.1)
ERYTHROCYTE [DISTWIDTH] IN BLOOD BY AUTOMATED COUNT: 14.6 % (ref 11.6–15.1)
ERYTHROCYTE [DISTWIDTH] IN BLOOD BY AUTOMATED COUNT: 15.2 % (ref 11.5–14.5)
ERYTHROCYTE [SEDIMENTATION RATE] IN BLOOD: 4 MM/HOUR (ref 0–19)
EST. AVERAGE GLUCOSE BLD GHB EST-MCNC: 120 MG/DL
ETHANOL SERPL-MCNC: <10 MG/DL
FDP BLD QL AGGL: ABNORMAL
FIBRINOGEN PPP-MCNC: 355 MG/DL (ref 227–495)
FLUAV RNA RESP QL NAA+PROBE: NEGATIVE
FLUBV RNA RESP QL NAA+PROBE: NEGATIVE
GFR SERPL CREATININE-BSD FRML MDRD: 17 ML/MIN/1.73SQ M
GFR SERPL CREATININE-BSD FRML MDRD: 18 ML/MIN/1.73SQ M
GFR SERPL CREATININE-BSD FRML MDRD: 23 ML/MIN/1.73SQ M
GFR SERPL CREATININE-BSD FRML MDRD: 32 ML/MIN/1.73SQ M
GFR SERPL CREATININE-BSD FRML MDRD: 95 ML/MIN/1.73SQ M
GFR SERPL CREATININE-BSD FRML MDRD: 95 ML/MIN/1.73SQ M
GFR SERPL CREATININE-BSD FRML MDRD: 96 ML/MIN/1.73SQ M
GLUCOSE P FAST SERPL-MCNC: 121 MG/DL (ref 65–99)
GLUCOSE P FAST SERPL-MCNC: 124 MG/DL (ref 65–99)
GLUCOSE SERPL-MCNC: 103 MG/DL (ref 65–140)
GLUCOSE SERPL-MCNC: 159 MG/DL (ref 65–99)
GLUCOSE SERPL-MCNC: 182 MG/DL (ref 65–99)
GLUCOSE SERPL-MCNC: 203 MG/DL (ref 65–140)
GLUCOSE SERPL-MCNC: 219 MG/DL (ref 65–140)
GLUCOSE SERPL-MCNC: 226 MG/DL (ref 65–140)
HBA1C MFR BLD: 5.8 %
HCO3 BLDA-SCNC: 17.3 MMOL/L (ref 22–28)
HCO3 BLDA-SCNC: 18.6 MMOL/L (ref 22–28)
HCO3 BLDA-SCNC: 20.2 MMOL/L (ref 22–28)
HCO3 BLDA-SCNC: 21.5 MMOL/L (ref 22–28)
HCT VFR BLD AUTO: 39.1 % (ref 36.5–49.3)
HCT VFR BLD AUTO: 42.2 % (ref 36.5–49.3)
HCT VFR BLD AUTO: 47.7 % (ref 36.5–49.3)
HCT VFR BLD AUTO: 49.5 % (ref 42–47)
HGB BLD-MCNC: 11.8 G/DL (ref 12–17)
HGB BLD-MCNC: 13.9 G/DL (ref 12–17)
HGB BLD-MCNC: 14.4 G/DL (ref 12–17)
HGB BLD-MCNC: 15.4 G/DL (ref 14–18)
HOROWITZ INDEX BLDA+IHG-RTO: 100 MM[HG]
HOROWITZ INDEX BLDA+IHG-RTO: 60 MM[HG]
HYPERCHROMIA BLD QL SMEAR: PRESENT
IMM GRANULOCYTES # BLD AUTO: 0.04 THOUSAND/UL (ref 0–0.2)
IMM GRANULOCYTES NFR BLD AUTO: 0 % (ref 0–2)
INR PPP: 1.37 (ref 0.84–1.19)
LACTATE SERPL-SCNC: 2.9 MMOL/L (ref 0.5–2)
LACTATE SERPL-SCNC: 2.9 MMOL/L (ref 0.5–2)
LACTATE SERPL-SCNC: 3 MMOL/L (ref 0.5–2)
LACTATE SERPL-SCNC: 3.9 MMOL/L (ref 0.5–2)
LDH SERPL-CCNC: 214 U/L (ref 81–234)
LIPASE SERPL-CCNC: 63 U/L (ref 11–82)
LYMPHOCYTES # BLD AUTO: 11.01 THOUSAND/UL (ref 0.6–4.47)
LYMPHOCYTES # BLD AUTO: 3.2 THOUSANDS/ΜL (ref 0.6–4.47)
LYMPHOCYTES # BLD AUTO: 3.4 THOUSANDS/ΜL (ref 0.6–4.47)
LYMPHOCYTES # BLD AUTO: 54 % (ref 14–44)
LYMPHOCYTES NFR BLD AUTO: 18 % (ref 21–51)
LYMPHOCYTES NFR BLD AUTO: 34 % (ref 14–44)
MACROCYTES BLD QL AUTO: PRESENT
MAGNESIUM SERPL-MCNC: 1.7 MG/DL (ref 1.9–2.7)
MAGNESIUM SERPL-MCNC: 1.8 MG/DL (ref 1.6–2.6)
MAGNESIUM SERPL-MCNC: 1.9 MG/DL (ref 1.6–2.6)
MAGNESIUM SERPL-MCNC: 2.3 MG/DL (ref 1.6–2.6)
MAGNESIUM SERPL-MCNC: 2.8 MG/DL (ref 1.6–2.6)
MCH RBC QN AUTO: 28.6 PG (ref 26.8–34.3)
MCH RBC QN AUTO: 28.6 PG (ref 26.8–34.3)
MCH RBC QN AUTO: 29 PG (ref 26.8–34.3)
MCH RBC QN AUTO: 29 PG (ref 26–34)
MCHC RBC AUTO-ENTMCNC: 30.2 G/DL (ref 31.4–37.4)
MCHC RBC AUTO-ENTMCNC: 30.2 G/DL (ref 31.4–37.4)
MCHC RBC AUTO-ENTMCNC: 31.1 G/DL (ref 31–37)
MCHC RBC AUTO-ENTMCNC: 32.9 G/DL (ref 31.4–37.4)
MCV RBC AUTO: 88 FL (ref 82–98)
MCV RBC AUTO: 93 FL (ref 81–99)
MCV RBC AUTO: 95 FL (ref 82–98)
MCV RBC AUTO: 95 FL (ref 82–98)
MONOCYTES # BLD AUTO: 0.61 THOUSAND/UL (ref 0–1.22)
MONOCYTES # BLD AUTO: 0.81 THOUSAND/ΜL (ref 0.17–1.22)
MONOCYTES # BLD AUTO: 1.9 THOUSAND/ΜL (ref 0.17–1.22)
MONOCYTES NFR BLD AUTO: 10 % (ref 2–12)
MONOCYTES NFR BLD AUTO: 9 % (ref 4–12)
MONOCYTES NFR BLD: 3 % (ref 4–12)
NEUTROPHILS # BLD AUTO: 14.3 THOUSANDS/ΜL (ref 1.4–6.5)
NEUTROPHILS # BLD AUTO: 5 THOUSANDS/ΜL (ref 1.85–7.62)
NEUTROPHILS # BLD MANUAL: 7.54 THOUSAND/UL (ref 1.85–7.62)
NEUTS BAND NFR BLD MANUAL: 4 % (ref 0–8)
NEUTS SEG NFR BLD AUTO: 33 % (ref 43–75)
NEUTS SEG NFR BLD AUTO: 54 % (ref 43–75)
NEUTS SEG NFR BLD AUTO: 73 % (ref 42–75)
NRBC BLD AUTO-RTO: 0 /100 WBCS
NRBC BLD AUTO-RTO: 1 /100 WBCS
NRBC BLD AUTO-RTO: 1 /100 WBCS
O2 CT BLDA-SCNC: 16.9 ML/DL (ref 16–23)
O2 CT BLDA-SCNC: 19.3 ML/DL (ref 16–23)
O2 CT BLDA-SCNC: 19.9 ML/DL (ref 16–23)
O2 CT BLDA-SCNC: 20.3 ML/DL
OB PNL GAST: POSITIVE
OXYHGB MFR BLDA: 93.7 % (ref 94–97)
OXYHGB MFR BLDA: 95 % (ref 94–97)
OXYHGB MFR BLDA: 98.5 % (ref 94–100)
OXYHGB MFR BLDA: 98.6 % (ref 94–97)
PCO2 BLDA: 31.9 MM HG (ref 35–45)
PCO2 BLDA: 34.2 MM HG (ref 36–44)
PCO2 BLDA: 35.6 MM HG (ref 36–44)
PCO2 BLDA: 41.3 MM HG (ref 36–44)
PCO2 TEMP ADJ BLDA: 34.7 MM HG (ref 36–44)
PEEP RESPIRATORY: 6 CM[H2O]
PEEP RESPIRATORY: 8 CM[H2O]
PH BLD: 7.35 [PH] (ref 7.35–7.45)
PH BLDA: 7.3 [PH] (ref 7.35–7.45)
PH BLDA: 7.31 [PH] (ref 7.35–7.45)
PH BLDA: 7.35 [PH] (ref 7.35–7.45)
PH BLDA: 7.44 [PH] (ref 7.35–7.45)
PHOSPHATE SERPL-MCNC: 2.5 MG/DL (ref 2.3–4.1)
PHOSPHATE SERPL-MCNC: 3 MG/DL (ref 2.3–4.1)
PHOSPHATE SERPL-MCNC: 3.6 MG/DL (ref 2.3–4.1)
PHOSPHATE SERPL-MCNC: 3.7 MG/DL (ref 2.3–4.1)
PLATELET # BLD AUTO: 137 THOUSANDS/UL (ref 149–390)
PLATELET # BLD AUTO: 59 THOUSANDS/UL (ref 149–390)
PLATELET # BLD AUTO: 67 THOUSANDS/UL (ref 149–390)
PLATELET # BLD AUTO: 86 THOUSANDS/UL (ref 149–390)
PLATELET # BLD AUTO: 87 THOUSANDS/UL (ref 149–390)
PLATELET BLD QL SMEAR: ABNORMAL
PLATELET BLD QL SMEAR: ABNORMAL
PMV BLD AUTO: 11.5 FL (ref 8.6–11.7)
PMV BLD AUTO: 12 FL (ref 8.9–12.7)
PMV BLD AUTO: 12.8 FL (ref 8.9–12.7)
PMV BLD AUTO: 13 FL (ref 8.9–12.7)
PMV BLD AUTO: 13.7 FL (ref 8.9–12.7)
PO2 BLD: 76.2 MM HG (ref 75–129)
PO2 BLDA: 196 MM HG (ref 80–100)
PO2 BLDA: 225.5 MM HG (ref 75–129)
PO2 BLDA: 74.7 MM HG (ref 75–129)
PO2 BLDA: 82.7 MM HG (ref 75–129)
POTASSIUM SERPL-SCNC: 3 MMOL/L (ref 3.5–5.5)
POTASSIUM SERPL-SCNC: 3.5 MMOL/L (ref 3.5–5.5)
POTASSIUM SERPL-SCNC: 3.7 MMOL/L (ref 3.5–5.3)
POTASSIUM SERPL-SCNC: 3.9 MMOL/L (ref 3.5–5.3)
POTASSIUM SERPL-SCNC: 4 MMOL/L (ref 3.5–5.3)
POTASSIUM SERPL-SCNC: 4.1 MMOL/L (ref 3.5–5.3)
POTASSIUM SERPL-SCNC: 4.1 MMOL/L (ref 3.5–5.3)
PROCALCITONIN SERPL-MCNC: 5.63 NG/ML
PROT SERPL-MCNC: 4.6 G/DL (ref 6.4–8.9)
PROT SERPL-MCNC: 4.8 G/DL (ref 6.4–8.9)
PROT SERPL-MCNC: 5.2 G/DL (ref 6.4–8.2)
PROT SERPL-MCNC: 5.5 G/DL (ref 6.4–8.2)
PROT SERPL-MCNC: 6.8 G/DL (ref 6.4–8.2)
PROT SERPL-MCNC: 7.2 G/DL (ref 6.4–8.2)
PROTHROMBIN TIME: 16.6 SECONDS (ref 11.6–14.5)
PTH-INTACT SERPL-MCNC: 142.6 PG/ML (ref 18.4–80.1)
RBC # BLD AUTO: 4.12 MILLION/UL (ref 3.88–5.62)
RBC # BLD AUTO: 4.8 MILLION/UL (ref 3.88–5.62)
RBC # BLD AUTO: 5.03 MILLION/UL (ref 3.88–5.62)
RBC # BLD AUTO: 5.31 MILLION/UL (ref 4.3–5.9)
RBC MORPH BLD: NORMAL
RBC MORPH BLD: PRESENT
RSV RNA RESP QL NAA+PROBE: NEGATIVE
SALICYLATES SERPL-MCNC: <5 MG/DL (ref 10–30)
SARS-COV-2 RNA RESP QL NAA+PROBE: NEGATIVE
SODIUM SERPL-SCNC: 138 MMOL/L (ref 136–145)
SODIUM SERPL-SCNC: 138 MMOL/L (ref 136–145)
SODIUM SERPL-SCNC: 140 MMOL/L (ref 136–145)
SODIUM SERPL-SCNC: 155 MMOL/L (ref 136–145)
SODIUM SERPL-SCNC: 157 MMOL/L (ref 136–145)
SODIUM SERPL-SCNC: 158 MMOL/L (ref 134–143)
SODIUM SERPL-SCNC: 160 MMOL/L (ref 134–143)
SPECIMEN SOURCE: ABNORMAL
SPECIMEN VOL UR: 1200 ML
TOTAL CELLS COUNTED SPEC: 100
TROPONIN I SERPL-MCNC: 0.42 NG/ML
TROPONIN I SERPL-MCNC: 0.52 NG/ML
TROPONIN I SERPL-MCNC: 0.59 NG/ML
TROPONIN I SERPL-MCNC: 0.59 NG/ML
TROPONIN I SERPL-MCNC: 0.76 NG/ML
TSH SERPL DL<=0.05 MIU/L-ACNC: 0.71 UIU/ML (ref 0.45–5.33)
URATE SERPL-MCNC: 7.2 MG/DL (ref 4.2–8)
URATE SERPL-MCNC: 8.1 MG/DL (ref 4.2–8)
VARIANT LYMPHS # BLD AUTO: 6 %
VENT AC: 14
VENT AC: 18
VT SETTING VENT: 500 ML
VT SETTING VENT: 550 ML
WBC # BLD AUTO: 19.7 THOUSAND/UL (ref 4.8–10.8)
WBC # BLD AUTO: 20.39 THOUSAND/UL (ref 4.31–10.16)
WBC # BLD AUTO: 21.62 THOUSAND/UL (ref 4.31–10.16)
WBC # BLD AUTO: 9.33 THOUSAND/UL (ref 4.31–10.16)

## 2021-01-01 PROCEDURE — 87040 BLOOD CULTURE FOR BACTERIA: CPT | Performed by: PHYSICIAN ASSISTANT

## 2021-01-01 PROCEDURE — 85420 FIBRINOLYTIC PLASMINOGEN: CPT | Performed by: PHYSICIAN ASSISTANT

## 2021-01-01 PROCEDURE — 99285 EMERGENCY DEPT VISIT HI MDM: CPT

## 2021-01-01 PROCEDURE — NC001 PR NO CHARGE: Performed by: PHYSICIAN ASSISTANT

## 2021-01-01 PROCEDURE — 82340 ASSAY OF CALCIUM IN URINE: CPT

## 2021-01-01 PROCEDURE — 85384 FIBRINOGEN ACTIVITY: CPT | Performed by: PHYSICIAN ASSISTANT

## 2021-01-01 PROCEDURE — 82533 TOTAL CORTISOL: CPT | Performed by: PHYSICIAN ASSISTANT

## 2021-01-01 PROCEDURE — 99291 CRITICAL CARE FIRST HOUR: CPT | Performed by: EMERGENCY MEDICINE

## 2021-01-01 PROCEDURE — 36620 INSERTION CATHETER ARTERY: CPT | Performed by: ANESTHESIOLOGY

## 2021-01-01 PROCEDURE — 99213 OFFICE O/P EST LOW 20 MIN: CPT | Performed by: ORTHOPAEDIC SURGERY

## 2021-01-01 PROCEDURE — 99292 CRITICAL CARE ADDL 30 MIN: CPT | Performed by: ANESTHESIOLOGY

## 2021-01-01 PROCEDURE — 96368 THER/DIAG CONCURRENT INF: CPT

## 2021-01-01 PROCEDURE — 96366 THER/PROPH/DIAG IV INF ADDON: CPT

## 2021-01-01 PROCEDURE — 36600 WITHDRAWAL OF ARTERIAL BLOOD: CPT

## 2021-01-01 PROCEDURE — 82805 BLOOD GASES W/O2 SATURATION: CPT | Performed by: PHYSICIAN ASSISTANT

## 2021-01-01 PROCEDURE — 82010 KETONE BODYS QUAN: CPT | Performed by: EMERGENCY MEDICINE

## 2021-01-01 PROCEDURE — 4A133J1 MONITORING OF ARTERIAL PULSE, PERIPHERAL, PERCUTANEOUS APPROACH: ICD-10-PCS | Performed by: ANESTHESIOLOGY

## 2021-01-01 PROCEDURE — 94760 N-INVAS EAR/PLS OXIMETRY 1: CPT

## 2021-01-01 PROCEDURE — 82271 OCCULT BLOOD OTHER SOURCES: CPT | Performed by: PHYSICIAN ASSISTANT

## 2021-01-01 PROCEDURE — 83036 HEMOGLOBIN GLYCOSYLATED A1C: CPT

## 2021-01-01 PROCEDURE — 84484 ASSAY OF TROPONIN QUANT: CPT | Performed by: EMERGENCY MEDICINE

## 2021-01-01 PROCEDURE — 83735 ASSAY OF MAGNESIUM: CPT

## 2021-01-01 PROCEDURE — 80053 COMPREHEN METABOLIC PANEL: CPT | Performed by: EMERGENCY MEDICINE

## 2021-01-01 PROCEDURE — 80053 COMPREHEN METABOLIC PANEL: CPT | Performed by: PHYSICIAN ASSISTANT

## 2021-01-01 PROCEDURE — 74177 CT ABD & PELVIS W/CONTRAST: CPT

## 2021-01-01 PROCEDURE — 83735 ASSAY OF MAGNESIUM: CPT | Performed by: EMERGENCY MEDICINE

## 2021-01-01 PROCEDURE — 85300 ANTITHROMBIN III ACTIVITY: CPT | Performed by: PHYSICIAN ASSISTANT

## 2021-01-01 PROCEDURE — 36556 INSERT NON-TUNNEL CV CATH: CPT | Performed by: EMERGENCY MEDICINE

## 2021-01-01 PROCEDURE — 83605 ASSAY OF LACTIC ACID: CPT | Performed by: PHYSICIAN ASSISTANT

## 2021-01-01 PROCEDURE — 82140 ASSAY OF AMMONIA: CPT | Performed by: EMERGENCY MEDICINE

## 2021-01-01 PROCEDURE — 87081 CULTURE SCREEN ONLY: CPT | Performed by: PHYSICIAN ASSISTANT

## 2021-01-01 PROCEDURE — 94002 VENT MGMT INPAT INIT DAY: CPT

## 2021-01-01 PROCEDURE — 82550 ASSAY OF CK (CPK): CPT | Performed by: EMERGENCY MEDICINE

## 2021-01-01 PROCEDURE — 31500 INSERT EMERGENCY AIRWAY: CPT | Performed by: EMERGENCY MEDICINE

## 2021-01-01 PROCEDURE — 0011A SARS-COV-2 / COVID-19 MRNA VACCINE (MODERNA) 100 MCG: CPT

## 2021-01-01 PROCEDURE — 36415 COLL VENOUS BLD VENIPUNCTURE: CPT | Performed by: EMERGENCY MEDICINE

## 2021-01-01 PROCEDURE — 84550 ASSAY OF BLOOD/URIC ACID: CPT

## 2021-01-01 PROCEDURE — 82553 CREATINE MB FRACTION: CPT | Performed by: EMERGENCY MEDICINE

## 2021-01-01 PROCEDURE — 83970 ASSAY OF PARATHORMONE: CPT

## 2021-01-01 PROCEDURE — 84100 ASSAY OF PHOSPHORUS: CPT

## 2021-01-01 PROCEDURE — 85730 THROMBOPLASTIN TIME PARTIAL: CPT | Performed by: PHYSICIAN ASSISTANT

## 2021-01-01 PROCEDURE — 85025 COMPLETE CBC W/AUTO DIFF WBC: CPT | Performed by: EMERGENCY MEDICINE

## 2021-01-01 PROCEDURE — 85610 PROTHROMBIN TIME: CPT | Performed by: PHYSICIAN ASSISTANT

## 2021-01-01 PROCEDURE — 80143 DRUG ASSAY ACETAMINOPHEN: CPT | Performed by: EMERGENCY MEDICINE

## 2021-01-01 PROCEDURE — 0241U HB NFCT DS VIR RESP RNA 4 TRGT: CPT | Performed by: EMERGENCY MEDICINE

## 2021-01-01 PROCEDURE — 5A1935Z RESPIRATORY VENTILATION, LESS THAN 24 CONSECUTIVE HOURS: ICD-10-PCS | Performed by: ANESTHESIOLOGY

## 2021-01-01 PROCEDURE — 36415 COLL VENOUS BLD VENIPUNCTURE: CPT

## 2021-01-01 PROCEDURE — 83880 ASSAY OF NATRIURETIC PEPTIDE: CPT | Performed by: EMERGENCY MEDICINE

## 2021-01-01 PROCEDURE — 72170 X-RAY EXAM OF PELVIS: CPT

## 2021-01-01 PROCEDURE — 82306 VITAMIN D 25 HYDROXY: CPT

## 2021-01-01 PROCEDURE — 92950 HEART/LUNG RESUSCITATION CPR: CPT | Performed by: EMERGENCY MEDICINE

## 2021-01-01 PROCEDURE — 99214 OFFICE O/P EST MOD 30 MIN: CPT | Performed by: INTERNAL MEDICINE

## 2021-01-01 PROCEDURE — 85025 COMPLETE CBC W/AUTO DIFF WBC: CPT

## 2021-01-01 PROCEDURE — 20610 DRAIN/INJ JOINT/BURSA W/O US: CPT | Performed by: ORTHOPAEDIC SURGERY

## 2021-01-01 PROCEDURE — 84100 ASSAY OF PHOSPHORUS: CPT | Performed by: PHYSICIAN ASSISTANT

## 2021-01-01 PROCEDURE — 72125 CT NECK SPINE W/O DYE: CPT

## 2021-01-01 PROCEDURE — 99214 OFFICE O/P EST MOD 30 MIN: CPT | Performed by: NURSE PRACTITIONER

## 2021-01-01 PROCEDURE — 93306 TTE W/DOPPLER COMPLETE: CPT | Performed by: INTERNAL MEDICINE

## 2021-01-01 PROCEDURE — 85007 BL SMEAR W/DIFF WBC COUNT: CPT | Performed by: PHYSICIAN ASSISTANT

## 2021-01-01 PROCEDURE — 85652 RBC SED RATE AUTOMATED: CPT

## 2021-01-01 PROCEDURE — 99292 CRITICAL CARE ADDL 30 MIN: CPT | Performed by: EMERGENCY MEDICINE

## 2021-01-01 PROCEDURE — 03HY32Z INSERTION OF MONITORING DEVICE INTO UPPER ARTERY, PERCUTANEOUS APPROACH: ICD-10-PCS | Performed by: ANESTHESIOLOGY

## 2021-01-01 PROCEDURE — 99291 CRITICAL CARE FIRST HOUR: CPT | Performed by: PHYSICIAN ASSISTANT

## 2021-01-01 PROCEDURE — 96365 THER/PROPH/DIAG IV INF INIT: CPT

## 2021-01-01 PROCEDURE — 85379 FIBRIN DEGRADATION QUANT: CPT | Performed by: PHYSICIAN ASSISTANT

## 2021-01-01 PROCEDURE — 82805 BLOOD GASES W/O2 SATURATION: CPT | Performed by: EMERGENCY MEDICINE

## 2021-01-01 PROCEDURE — 71045 X-RAY EXAM CHEST 1 VIEW: CPT

## 2021-01-01 PROCEDURE — 80048 BASIC METABOLIC PNL TOTAL CA: CPT

## 2021-01-01 PROCEDURE — 80053 COMPREHEN METABOLIC PANEL: CPT

## 2021-01-01 PROCEDURE — 96367 TX/PROPH/DG ADDL SEQ IV INF: CPT

## 2021-01-01 PROCEDURE — 85049 AUTOMATED PLATELET COUNT: CPT | Performed by: PHYSICIAN ASSISTANT

## 2021-01-01 PROCEDURE — 84484 ASSAY OF TROPONIN QUANT: CPT | Performed by: PHYSICIAN ASSISTANT

## 2021-01-01 PROCEDURE — 70486 CT MAXILLOFACIAL W/O DYE: CPT

## 2021-01-01 PROCEDURE — 80179 DRUG ASSAY SALICYLATE: CPT | Performed by: EMERGENCY MEDICINE

## 2021-01-01 PROCEDURE — 82330 ASSAY OF CALCIUM: CPT | Performed by: PHYSICIAN ASSISTANT

## 2021-01-01 PROCEDURE — 93306 TTE W/DOPPLER COMPLETE: CPT

## 2021-01-01 PROCEDURE — 85027 COMPLETE CBC AUTOMATED: CPT | Performed by: PHYSICIAN ASSISTANT

## 2021-01-01 PROCEDURE — 83690 ASSAY OF LIPASE: CPT | Performed by: EMERGENCY MEDICINE

## 2021-01-01 PROCEDURE — 99211 OFF/OP EST MAY X REQ PHY/QHP: CPT

## 2021-01-01 PROCEDURE — 82077 ASSAY SPEC XCP UR&BREATH IA: CPT | Performed by: EMERGENCY MEDICINE

## 2021-01-01 PROCEDURE — 4A133B1 MONITORING OF ARTERIAL PRESSURE, PERIPHERAL, PERCUTANEOUS APPROACH: ICD-10-PCS | Performed by: ANESTHESIOLOGY

## 2021-01-01 PROCEDURE — 82550 ASSAY OF CK (CPK): CPT | Performed by: PHYSICIAN ASSISTANT

## 2021-01-01 PROCEDURE — 93005 ELECTROCARDIOGRAM TRACING: CPT

## 2021-01-01 PROCEDURE — 99292 CRITICAL CARE ADDL 30 MIN: CPT | Performed by: PHYSICIAN ASSISTANT

## 2021-01-01 PROCEDURE — 83615 LACTATE (LD) (LDH) ENZYME: CPT

## 2021-01-01 PROCEDURE — 83735 ASSAY OF MAGNESIUM: CPT | Performed by: PHYSICIAN ASSISTANT

## 2021-01-01 PROCEDURE — 82948 REAGENT STRIP/BLOOD GLUCOSE: CPT

## 2021-01-01 PROCEDURE — 85362 FIBRIN DEGRADATION PRODUCTS: CPT | Performed by: PHYSICIAN ASSISTANT

## 2021-01-01 PROCEDURE — 84443 ASSAY THYROID STIM HORMONE: CPT | Performed by: EMERGENCY MEDICINE

## 2021-01-01 PROCEDURE — 84145 PROCALCITONIN (PCT): CPT | Performed by: PHYSICIAN ASSISTANT

## 2021-01-01 PROCEDURE — 70450 CT HEAD/BRAIN W/O DYE: CPT

## 2021-01-01 PROCEDURE — 71260 CT THORAX DX C+: CPT

## 2021-01-01 PROCEDURE — 82553 CREATINE MB FRACTION: CPT | Performed by: PHYSICIAN ASSISTANT

## 2021-01-01 PROCEDURE — 83605 ASSAY OF LACTIC ACID: CPT | Performed by: EMERGENCY MEDICINE

## 2021-01-01 PROCEDURE — 86140 C-REACTIVE PROTEIN: CPT

## 2021-01-01 PROCEDURE — 91301 SARS-COV-2 / COVID-19 MRNA VACCINE (MODERNA) 100 MCG: CPT

## 2021-01-01 RX ORDER — MIDAZOLAM HYDROCHLORIDE 2 MG/2ML
2 INJECTION, SOLUTION INTRAMUSCULAR; INTRAVENOUS ONCE
Status: COMPLETED | OUTPATIENT
Start: 2021-01-01 | End: 2021-01-01

## 2021-01-01 RX ORDER — ALBUMIN, HUMAN INJ 5% 5 %
25 SOLUTION INTRAVENOUS ONCE
Status: COMPLETED | OUTPATIENT
Start: 2021-01-01 | End: 2021-01-01

## 2021-01-01 RX ORDER — PROPOFOL 10 MG/ML
5-50 INJECTION, EMULSION INTRAVENOUS
Status: DISCONTINUED | OUTPATIENT
Start: 2021-01-01 | End: 2021-01-01

## 2021-01-01 RX ORDER — METHYLPREDNISOLONE ACETATE 40 MG/ML
2 INJECTION, SUSPENSION INTRA-ARTICULAR; INTRALESIONAL; INTRAMUSCULAR; SOFT TISSUE
Status: COMPLETED | OUTPATIENT
Start: 2021-01-01 | End: 2021-01-01

## 2021-01-01 RX ORDER — ALBUMIN, HUMAN INJ 5% 5 %
SOLUTION INTRAVENOUS
Status: COMPLETED
Start: 2021-01-01 | End: 2021-01-01

## 2021-01-01 RX ORDER — VANCOMYCIN HYDROCHLORIDE 1 G/200ML
15 INJECTION, SOLUTION INTRAVENOUS DAILY PRN
Status: DISCONTINUED | OUTPATIENT
Start: 2021-01-01 | End: 2021-01-01

## 2021-01-01 RX ORDER — LANOLIN ALCOHOL/MO/W.PET/CERES
400 CREAM (GRAM) TOPICAL DAILY
COMMUNITY
Start: 2021-01-01 | End: 2021-01-01 | Stop reason: HOSPADM

## 2021-01-01 RX ORDER — PREDNISOLONE ACETATE 10 MG/ML
SUSPENSION/ DROPS OPHTHALMIC
COMMUNITY
Start: 2021-01-01 | End: 2021-01-01 | Stop reason: HOSPADM

## 2021-01-01 RX ORDER — BENAZEPRIL HYDROCHLORIDE 40 MG/1
40 TABLET, FILM COATED ORAL DAILY
Qty: 90 TABLET | Refills: 1 | Status: SHIPPED | OUTPATIENT
Start: 2021-01-01 | End: 2021-01-01 | Stop reason: HOSPADM

## 2021-01-01 RX ORDER — HYDROMORPHONE HCL/PF 1 MG/ML
1 SYRINGE (ML) INJECTION
Status: COMPLETED | OUTPATIENT
Start: 2021-01-01 | End: 2021-01-01

## 2021-01-01 RX ORDER — PHENYLEPHRINE HYDROCHLORIDE 10 MG/ML
INJECTION INTRAVENOUS
Status: DISPENSED
Start: 2021-01-01 | End: 2021-01-01

## 2021-01-01 RX ORDER — FENTANYL CITRATE-0.9 % NACL/PF 10 MCG/ML
50 PLASTIC BAG, INJECTION (ML) INTRAVENOUS CONTINUOUS
Status: DISCONTINUED | OUTPATIENT
Start: 2021-01-01 | End: 2021-01-01 | Stop reason: HOSPADM

## 2021-01-01 RX ORDER — CHLORHEXIDINE GLUCONATE 0.12 MG/ML
15 RINSE ORAL EVERY 12 HOURS SCHEDULED
Status: DISCONTINUED | OUTPATIENT
Start: 2021-01-01 | End: 2021-01-01

## 2021-01-01 RX ORDER — ATENOLOL 50 MG/1
50 TABLET ORAL DAILY
Qty: 90 TABLET | Refills: 1 | Status: SHIPPED | OUTPATIENT
Start: 2021-01-01 | End: 2021-01-01 | Stop reason: HOSPADM

## 2021-01-01 RX ORDER — PANTOPRAZOLE SODIUM 40 MG/1
40 INJECTION, POWDER, FOR SOLUTION INTRAVENOUS EVERY 12 HOURS SCHEDULED
Status: DISCONTINUED | OUTPATIENT
Start: 2021-01-01 | End: 2021-01-01

## 2021-01-01 RX ORDER — NOREPINEPHRINE BITARTRATE 1 MG/ML
INJECTION, SOLUTION INTRAVENOUS
Status: COMPLETED
Start: 2021-01-01 | End: 2021-01-01

## 2021-01-01 RX ORDER — MAGNESIUM SULFATE HEPTAHYDRATE 40 MG/ML
2 INJECTION, SOLUTION INTRAVENOUS ONCE
Status: DISCONTINUED | OUTPATIENT
Start: 2021-01-01 | End: 2021-01-01 | Stop reason: HOSPADM

## 2021-01-01 RX ORDER — CEFEPIME HYDROCHLORIDE 1 G/50ML
1000 INJECTION, SOLUTION INTRAVENOUS EVERY 12 HOURS
Status: DISCONTINUED | OUTPATIENT
Start: 2021-01-01 | End: 2021-01-01

## 2021-01-01 RX ORDER — VANCOMYCIN HYDROCHLORIDE 1 G/200ML
15 INJECTION, SOLUTION INTRAVENOUS DAILY PRN
Status: DISCONTINUED | OUTPATIENT
Start: 2021-04-14 | End: 2021-01-01

## 2021-01-01 RX ORDER — BLOOD PRESSURE TEST KIT
KIT MISCELLANEOUS DAILY
Qty: 1 EACH | Refills: 0 | Status: SHIPPED | OUTPATIENT
Start: 2021-01-01

## 2021-01-01 RX ORDER — FAMOTIDINE 40 MG/5ML
20 POWDER, FOR SUSPENSION ORAL DAILY
Status: DISCONTINUED | OUTPATIENT
Start: 2021-01-01 | End: 2021-01-01

## 2021-01-01 RX ORDER — ATROPINE SULFATE 10 MG/ML
SOLUTION/ DROPS OPHTHALMIC
COMMUNITY
Start: 2021-01-01 | End: 2021-01-01 | Stop reason: HOSPADM

## 2021-01-01 RX ORDER — POTASSIUM CHLORIDE 14.9 MG/ML
20 INJECTION INTRAVENOUS ONCE
Status: COMPLETED | OUTPATIENT
Start: 2021-01-01 | End: 2021-01-01

## 2021-01-01 RX ORDER — SODIUM CHLORIDE 9 MG/ML
125 INJECTION, SOLUTION INTRAVENOUS CONTINUOUS
Status: DISCONTINUED | OUTPATIENT
Start: 2021-01-01 | End: 2021-01-01 | Stop reason: HOSPADM

## 2021-01-01 RX ORDER — LATANOPROSTENE BUNOD 0.24 MG/ML
SOLUTION/ DROPS OPHTHALMIC
COMMUNITY
Start: 2020-01-01 | End: 2021-01-01 | Stop reason: HOSPADM

## 2021-01-01 RX ORDER — HEPARIN SODIUM 5000 [USP'U]/ML
5000 INJECTION, SOLUTION INTRAVENOUS; SUBCUTANEOUS EVERY 8 HOURS SCHEDULED
Status: DISCONTINUED | OUTPATIENT
Start: 2021-01-01 | End: 2021-01-01

## 2021-01-01 RX ORDER — FENTANYL CITRATE 50 UG/ML
100 INJECTION, SOLUTION INTRAMUSCULAR; INTRAVENOUS ONCE
Status: COMPLETED | OUTPATIENT
Start: 2021-01-01 | End: 2021-01-01

## 2021-01-01 RX ORDER — SODIUM CHLORIDE, SODIUM LACTATE, POTASSIUM CHLORIDE, CALCIUM CHLORIDE 600; 310; 30; 20 MG/100ML; MG/100ML; MG/100ML; MG/100ML
125 INJECTION, SOLUTION INTRAVENOUS CONTINUOUS
Status: DISCONTINUED | OUTPATIENT
Start: 2021-01-01 | End: 2021-01-01

## 2021-01-01 RX ORDER — BUPIVACAINE HYDROCHLORIDE 2.5 MG/ML
4 INJECTION, SOLUTION INFILTRATION; PERINEURAL
Status: COMPLETED | OUTPATIENT
Start: 2021-01-01 | End: 2021-01-01

## 2021-01-01 RX ORDER — PROPOFOL 10 MG/ML
5-50 INJECTION, EMULSION INTRAVENOUS
Status: DISCONTINUED | OUTPATIENT
Start: 2021-01-01 | End: 2021-01-01 | Stop reason: HOSPADM

## 2021-01-01 RX ADMIN — ALBUMIN, HUMAN INJ 5% 25 G: 5 SOLUTION at 06:28

## 2021-01-01 RX ADMIN — HYDROCORTISONE SODIUM SUCCINATE 50 MG: 100 INJECTION, POWDER, FOR SOLUTION INTRAMUSCULAR; INTRAVENOUS at 12:47

## 2021-01-01 RX ADMIN — POTASSIUM CHLORIDE 20 MEQ: 14.9 INJECTION, SOLUTION INTRAVENOUS at 00:34

## 2021-01-01 RX ADMIN — NOREPINEPHRINE BITARTRATE 30 MCG/MIN: 1 INJECTION INTRAVENOUS at 08:49

## 2021-01-01 RX ADMIN — SODIUM CHLORIDE, SODIUM LACTATE, POTASSIUM CHLORIDE, AND CALCIUM CHLORIDE 500 ML: .6; .31; .03; .02 INJECTION, SOLUTION INTRAVENOUS at 08:32

## 2021-01-01 RX ADMIN — ALBUMIN (HUMAN) 25 G: 12.5 INJECTION, SOLUTION INTRAVENOUS at 06:28

## 2021-01-01 RX ADMIN — NOREPINEPHRINE BITARTRATE 15 MCG/MIN: 1 INJECTION INTRAVENOUS at 04:43

## 2021-01-01 RX ADMIN — EPINEPHRINE 2 MCG/MIN: 1 INJECTION, SOLUTION, CONCENTRATE INTRAVENOUS at 09:30

## 2021-01-01 RX ADMIN — SODIUM BICARBONATE 50 MEQ: 84 INJECTION, SOLUTION INTRAVENOUS at 06:26

## 2021-01-01 RX ADMIN — BUPIVACAINE HYDROCHLORIDE 4 ML: 2.5 INJECTION, SOLUTION INFILTRATION; PERINEURAL at 10:26

## 2021-01-01 RX ADMIN — CHLORHEXIDINE GLUCONATE 0.12% ORAL RINSE 15 ML: 1.2 LIQUID ORAL at 09:10

## 2021-01-01 RX ADMIN — PIPERACILLIN SODIUM AND TAZOBACTAM SODIUM 3.38 G: 3; .375 INJECTION, POWDER, LYOPHILIZED, FOR SOLUTION INTRAVENOUS at 23:38

## 2021-01-01 RX ADMIN — HYDROCORTISONE SODIUM SUCCINATE 100 MG: 100 INJECTION, POWDER, FOR SOLUTION INTRAMUSCULAR; INTRAVENOUS at 06:44

## 2021-01-01 RX ADMIN — SODIUM CHLORIDE 1000 ML: 0.9 INJECTION, SOLUTION INTRAVENOUS at 01:46

## 2021-01-01 RX ADMIN — SODIUM CHLORIDE, SODIUM LACTATE, POTASSIUM CHLORIDE, AND CALCIUM CHLORIDE 125 ML/HR: .6; .31; .03; .02 INJECTION, SOLUTION INTRAVENOUS at 05:18

## 2021-01-01 RX ADMIN — HYDROMORPHONE HYDROCHLORIDE 1 MG: 1 INJECTION, SOLUTION INTRAMUSCULAR; INTRAVENOUS; SUBCUTANEOUS at 15:16

## 2021-01-01 RX ADMIN — MAGNESIUM SULFATE HEPTAHYDRATE 2 G: 40 INJECTION, SOLUTION INTRAVENOUS at 02:11

## 2021-01-01 RX ADMIN — IOHEXOL 100 ML: 350 INJECTION, SOLUTION INTRAVENOUS at 23:03

## 2021-01-01 RX ADMIN — NOREPINEPHRINE BITARTRATE 16 MCG/MIN: 1 INJECTION INTRAVENOUS at 11:58

## 2021-01-01 RX ADMIN — PROPOFOL 20 MCG/KG/MIN: 10 INJECTION, EMULSION INTRAVENOUS at 04:32

## 2021-01-01 RX ADMIN — METHYLPREDNISOLONE ACETATE 2 ML: 40 INJECTION, SUSPENSION INTRA-ARTICULAR; INTRALESIONAL; INTRAMUSCULAR; SOFT TISSUE at 10:26

## 2021-01-01 RX ADMIN — PHENYLEPHRINE HYDROCHLORIDE 25 MCG/MIN: 10 INJECTION INTRAVENOUS at 06:26

## 2021-01-01 RX ADMIN — VASOPRESSIN 0.04 UNITS/MIN: 20 INJECTION INTRAVENOUS at 11:28

## 2021-01-01 RX ADMIN — INSULIN LISPRO 2 UNITS: 100 INJECTION, SOLUTION INTRAVENOUS; SUBCUTANEOUS at 12:47

## 2021-01-01 RX ADMIN — SODIUM CHLORIDE 1000 ML: 0.9 INJECTION, SOLUTION INTRAVENOUS at 00:05

## 2021-01-01 RX ADMIN — SODIUM CHLORIDE, SODIUM LACTATE, POTASSIUM CHLORIDE, AND CALCIUM CHLORIDE 500 ML: .6; .31; .03; .02 INJECTION, SOLUTION INTRAVENOUS at 07:15

## 2021-01-01 RX ADMIN — Medication 5 MCG/MIN: at 22:40

## 2021-01-01 RX ADMIN — VASOPRESSIN 0.04 UNITS/MIN: 20 INJECTION INTRAVENOUS at 05:14

## 2021-01-01 RX ADMIN — MIDAZOLAM 2 MG: 1 INJECTION INTRAMUSCULAR; INTRAVENOUS at 15:20

## 2021-01-01 RX ADMIN — NOREPINEPHRINE BITARTRATE 30 MCG: 1 INJECTION INTRAVENOUS at 06:47

## 2021-01-01 RX ADMIN — Medication 50 MCG/HR: at 04:41

## 2021-01-01 RX ADMIN — VANCOMYCIN HYDROCHLORIDE 1250 MG: 1 INJECTION, POWDER, LYOPHILIZED, FOR SOLUTION INTRAVENOUS at 00:26

## 2021-01-01 RX ADMIN — HEPARIN SODIUM 5000 UNITS: 5000 INJECTION INTRAVENOUS; SUBCUTANEOUS at 05:21

## 2021-01-01 RX ADMIN — FENTANYL CITRATE 100 MCG: 0.05 INJECTION, SOLUTION INTRAMUSCULAR; INTRAVENOUS at 04:30

## 2021-01-01 RX ADMIN — INSULIN LISPRO 2 UNITS: 100 INJECTION, SOLUTION INTRAVENOUS; SUBCUTANEOUS at 06:29

## 2021-01-01 RX ADMIN — SODIUM CHLORIDE 1000 ML: 0.9 INJECTION, SOLUTION INTRAVENOUS at 23:10

## 2021-01-01 RX ADMIN — CEFEPIME HYDROCHLORIDE 1000 MG: 1 INJECTION, SOLUTION INTRAVENOUS at 05:21

## 2021-01-12 NOTE — TELEPHONE ENCOUNTER
Appointment Confirmation     Appointment with  Dr Schaefer Speaker   Appointment date & time 02/04 at 1:20pm   Location [place]   Patient verbilized Understanding  yes

## 2021-01-12 NOTE — TELEPHONE ENCOUNTER
Reschedule Appointment     Who is calling in  Patient    Doctor Appointment Scheduled with Dr Taylor    Original date and time 1- @ 1:40pm   New date and time 02- @ 1:20pm    Location [address] [place] [address]    Patient verbalized understanding

## 2021-01-19 NOTE — PROGRESS NOTES
[facility]        NAME: Jeff Rosenthal is a 79 y o  male  : 1950    MRN: [id number]  DATE: 2021  TIME: 1:57 PM    Assessment and Plan   Pre-operative clearance [Z01 818]  1  Pre-operative clearance     2  Hypomagnesemia  Magnesium   3  Glaucoma of right eye, unspecified glaucoma type       1  Hypomagnesemia  Recheck level, if within normal range will stop magnesium level  - Magnesium; Future    2  Pre-operative clearance  Patient is low risk for surgery with local anesthesia  3  Glaucoma of right eye, unspecified glaucoma type  Form faxed and original given back to patient  Patient Instructions     Patient Instructions   Low risk for surgery          Chief Complaint     Chief Complaint   Patient presents with    Pre-op Clearance         History of Present Illness       Presurgical Evaluation    Patient ID: Jeff Rosenthal is a 79 y o  male  Patient presents with:  Pre-op Clearance    Procedure date: 2021  Surgeon:  Dr Patel Gracia  Planned procedure:  Laser Surgery   Diagnosis for procedure:  Glaucoma  Reports that he has trouble seeing out of the right eye  Surgery is on the right eye  Anesthesia is local   History of Type 2 Dm, HTN, CVD, HLD, recent (2020) radiation treatment of lymphoma to lymph node of head, neck and face       Allergies on file:   Sulfa antibiotics and Allopurinol    Preop labs/testing available and reviewed: no  EKG no  Echo no                      Review of Systems   Review of Systems    PHQ-9 Depression Screening    PHQ-9:   Frequency of the following problems over the past two weeks:      Little interest or pleasure in doing things: 0 - not at all  Feeling down, depressed, or hopeless: 0 - not at all  PHQ-2 Score: 0        Current Medications       Current Outpatient Medications:     amLODIPine (NORVASC) 10 mg tablet, take 1 tablet by mouth once daily, Disp: 90 tablet, Rfl: 1    aspirin 81 MG tablet, Take 81 mg by mouth daily, Disp: , Rfl:     atenolol (TENORMIN) 50 mg tablet, take 1 tablet by mouth once daily, Disp: 90 tablet, Rfl: 1    benazepril (LOTENSIN) 20 mg tablet, Take 1 tablet (20 mg total) by mouth daily, Disp: 90 tablet, Rfl: 1    COMBIGAN 0 2-0 5 %, 1 drop every 12 (twelve) hours 1 drop each eye twice daily, Disp: , Rfl:     diclofenac sodium (VOLTAREN) 1 %, Apply 2 g topically 2 (two) times a day as needed (pain), Disp: 100 g, Rfl: 2    glucose blood (ONE TOUCH ULTRA TEST) test strip, 1 each by Other route daily Use as instructed, Disp: 100 each, Rfl: 3    hydrochlorothiazide (HYDRODIURIL) 25 mg tablet, take 1 tablet by mouth once daily, Disp: 90 tablet, Rfl: 1    Januvia 50 MG tablet, take 1 tablet by mouth once daily, Disp: 30 tablet, Rfl: 3    metFORMIN (GLUCOPHAGE) 1000 MG tablet, take 1 tablet by mouth twice a day WITH A MEAL, Disp: 180 tablet, Rfl: 1    ONETOUCH DELICA LANCETS FINE MISC, by Does not apply route daily, Disp: 50 each, Rfl: 2    RHOPRESSA 0 02 % SOLN, daily at bedtime 1 Drop each eye at bedtime, Disp: , Rfl:     simvastatin (ZOCOR) 20 mg tablet, take 1 tablet by mouth at bedtime, Disp: 90 tablet, Rfl: 1    Vyzulta 0 024 % SOLN, instill 1 drop into both eyes every NIGHT, Disp: , Rfl:     atropine (ISOPTO ATROPINE) 1 % ophthalmic solution, instill 1 drop into right eye twice a day AFTER SURGERY, Disp: , Rfl:     magnesium Oxide (MAG-OX) 400 mg TABS, Take 400 mg by mouth daily, Disp: , Rfl:     prednisoLONE acetate (PRED FORTE) 1 % ophthalmic suspension, instill 1 drop into right eye EVERY 1-2 HOURS AFTER SURGERY, Disp: , Rfl:     Current Allergies     Allergies as of 01/19/2021 - Reviewed 01/19/2021   Allergen Reaction Noted    Sulfa antibiotics Rash, Fever, and Tongue Swelling 08/09/2018    Allopurinol  08/09/2018            The following portions of the patient's history were reviewed and updated as appropriate: allergies, current medications, past family history, past medical history, past social history, past surgical history and problem list      Past Medical History:   Diagnosis Date    Cerebral vascular disease     Diabetes mellitus ([facility] [address] )     Glaucoma     Hyperlipidemia     Hypertension     Lymphoma ([facility] [address] )        Past Surgical History:   Procedure Laterality Date    CATARACT EXTRACTION      COLONOSCOPY      GANGLION CYST EXCISION      GLAUCOMA SURGERY Right 2019    SKIN LESION EXCISION         Family History   Problem Relation Age of Onset    Heart disease Mother     Asthma Mother     Emphysema Mother     Hypertension Mother     Diabetes Father     Lung cancer Maternal Uncle     Breast cancer Cousin          Medications have been verified  Objective   /76   Pulse 81   Temp 98 8 °F (37 1 °C)   Resp 20   Ht 5' 7" (1 702 m)   Wt 87 5 kg (193 lb)   SpO2 98%   BMI 30 23 kg/m²        Physical Exam     Physical Exam  Vitals signs and nursing note reviewed  Constitutional:       General: He is not in acute distress  Appearance: Normal appearance  He is well-developed  He is not ill-appearing  Eyes:      General: Lids are normal       Extraocular Movements:      Right eye: Normal extraocular motion and no nystagmus  Left eye: Normal extraocular motion and no nystagmus  Conjunctiva/sclera:      Right eye: Right conjunctiva is injected (mild)  No exudate or hemorrhage  Left eye: Left conjunctiva is injected (mild)  No exudate or hemorrhage  Pupils: Pupils are unequal (right larger than left  )  Comments: Patient with multiple eye surgeries   Cardiovascular:      Rate and Rhythm: Normal rate and regular rhythm  Heart sounds: Normal heart sounds, S1 normal and S2 normal  No murmur  No friction rub  No gallop  Pulmonary:      Effort: Pulmonary effort is normal  No respiratory distress  Breath sounds: Normal breath sounds  No decreased breath sounds or wheezing  Musculoskeletal: Normal range of motion  General: No tenderness or deformity  Skin:     General: Skin is warm  Findings: No erythema or rash  Neurological:      Mental Status: He is alert and oriented to person, place, and time  Psychiatric:         Behavior: Behavior normal  Behavior is cooperative  Thought Content:  Thought content normal

## 2021-02-03 NOTE — TELEPHONE ENCOUNTER
Patient called in to verify if we are open tomorrow - reached out to AD and was advised that we are open

## 2021-02-04 NOTE — PROGRESS NOTES
Hematology/Oncology Outpatient Follow-up  Pradip Koch 79 y o  male 1950 [id number]    Date:  2/4/2021        Assessment and Plan:  1  Small cell b-cell lymphoma, lymph nodes of head, face, and neck (HCC)   the patient does not have any hint of constitutional symptoms or other skin lesions at this point in time  The facial skin lesion on the left side as the disappeared after  15 sessions of radiation which was delivered around [facility] November of last year  The patient will be continued under close surveillance on every 4  Months basis  He was instructed to get in touch with us sooner if there is any need for that  - CBC and differential; Future  - Comprehensive metabolic panel; Future  - LD,Blood; Future  - PTH, intact; Future    2  Thrombocytopenia ([address] )    The thrombocytopenia could be related to the SLL/immune mediated process  His platelet count seems to be better in comparison to the previous measurements  3  Hypercalcemia    The intact PTH was slightly above normal   This will be repeated again I did ask him to follow up with his primary care team   - Comprehensive metabolic panel; Future  - PTH, intact; Future    4  Hyperuricemia    He seems to have barely above normal uric acid level  The etiology is not entirely clear  The uric acid level needs to be monitored closely  Allopurinol at the low-dose  Of 100 mg once a day could be entertained  He stated that he would like to discuss this with his primary care team   - Uric acid; Future        HPI:    The patient came today for a follow-up visit  He denied significant constitutional symptoms whatsoever  The skin lesion on the left facial region where he had his radiation is entirely gone  He had blood work on 01/29/2021 which showed hemoglobin of 13 9 and platelet count 923 white cell count of 9 3  The white cell differential is within normal range  CMP showed creatinine of 0 6 with calcium of 11 1    Liver enzymes were within normal range  LDH, sed rate and C-reactive protein were within normal range  Uric acid continues to be  Slightly higher than average at 8 1  Oncology History    Patient has a history of diabetes mellitus type 2, hyperlipidemia, hypertension and glaucoma      He stated that he noticed skin rash on the left side of his face close to the left ear area   The skin rash became raised and transformed into 3 or 4 small masses adjacent to each other  A punch biopsy was done on 2 of  the lesions from the left temple area on 06/15/2020  Awilda Keller initial pathology revealed dense pan dermal atypical lymphoid infiltrate   The case was then sent to [address] [place] [address] for 2nd opinion  Awilda Keller final diagnosis came back compatible with small lymphocytic lymphoma/chronic lymphocytic leukemia      The patient denied any significant constitutional symptoms or any other lesions on his body  His blood work from January 2020 showed hypercalcemia of unknown etiology      He had a PET-CT scan on 08/04/2020 which showed:  IMPRESSION:  1   There is a hypermetabolic cutaneous nodule overlying the left zygomatic arch measuring 2 x 0 9 cm, SUV 6 3   This is most concerning for malignancy  2   No additional hypermetabolic cutaneous lesion  3   No additional hypermetabolic lesions or adenopathy in the neck, chest, abdomen or pelvis that are concerning for malignancy/metastases      He also had blood work on 07/28/2020 which showed normal white cell count of 8 9 with normal white cell differential   His hemoglobin was 14 7 with a platelet count of a 969812  He has high calcium level with corrected calcium of 10  7    Monoclonal gammopathy workup came back negative   Uric acid was 8 8   The rest of the workup was normal          Small cell b-cell lymphoma, lymph nodes of head, face, and neck ([facility] )    6/15/2020 Initial Diagnosis      Small cell b-cell lymphoma, lymph nodes of head, face, and neck ([facility] )       6/15/2020 Biopsy      A   Skin, superior left temple, punch biopsy:   Dense pan-dermal atypical lymphoid infiltrate (see note)        B  Skin, inferior left temple, punch biopsy:   Dense pan-dermal atypical lymphoid infiltrate (see note)     [facility] consultation:   Skin, superior left temple, punch biopsy:   - Small lymphocytic lymphoma/chronic lymphocytic leukemia, see note and microscopic description     Skin, inferior left temple, punch biopsy:   - Small lymphocytic lymphoma/chronic lymphocytic leukemia, see note and microscopic description            Oncology History   Small cell b-cell lymphoma, lymph nodes of head, face, and neck ([facility] [address] )   6/15/2020 Initial Diagnosis    Small cell b-cell lymphoma, lymph nodes of head, face, and neck ([facility] [address] )     6/15/2020 Biopsy    A  Skin, superior left temple, punch biopsy:   Dense pan-dermal atypical lymphoid infiltrate (see note)  B  Skin, inferior left temple, punch biopsy:   Dense pan-dermal atypical lymphoid infiltrate (see note)  [facility] consultation:   Skin, superior left temple, punch biopsy:   - Small lymphocytic lymphoma/chronic lymphocytic leukemia, see note and microscopic description     Skin, inferior left temple, punch biopsy:   - Small lymphocytic lymphoma/chronic lymphocytic leukemia, see note and microscopic description      9/11/2020 - 10/5/2020 Radiation    Plan ID Energy Fractions Dose per Fraction (cGy) Dose Correction (cGy) Total Dose Delivered (cGy) Elapsed Days   L PreAuricul 9E 15 / 15 200 0 3,000 24             Interval history:    ROS: Review of Systems   Constitutional: Negative for chills and fever  HENT: Negative for ear pain and sore throat  Eyes: Negative for pain and visual disturbance  Respiratory: Negative for cough and shortness of breath  Cardiovascular: Negative for chest pain and palpitations  Gastrointestinal: Negative for abdominal pain and vomiting  Genitourinary: Negative for dysuria and hematuria     Musculoskeletal: Positive for arthralgias  Negative for back pain  Skin: Negative for color change and rash  Neurological: Negative for seizures and syncope  Psychiatric/Behavioral: Positive for sleep disturbance  All other systems reviewed and are negative  Past Medical History:   Diagnosis Date    Cerebral vascular disease     Diabetes mellitus ([facility] )     Glaucoma     Hyperlipidemia     Hypertension     Lymphoma ([facility] )        Past Surgical History:   Procedure Laterality Date    CATARACT EXTRACTION      COLONOSCOPY      GANGLION CYST EXCISION      GLAUCOMA SURGERY Right 2019    SKIN LESION EXCISION         Social History     Socioeconomic History    Marital status: Single     Spouse name: None    Number of children: None    Years of education: None    Highest education level: None   Occupational History    None   Social Needs    Financial resource strain: None    Food insecurity     Worry: None     Inability: None    Transportation needs     Medical: None     Non-medical: None   Tobacco Use    Smoking status: Former Smoker     Types: Cigars    Smokeless tobacco: Never Used   Substance and Sexual Activity    Alcohol use:  Yes     Alcohol/week: 1 0 standard drinks     Types: 1 Cans of beer per week     Comment: rarely    Drug use: No    Sexual activity: None   Lifestyle    Physical activity     Days per week: None     Minutes per session: None    Stress: None   Relationships    Social connections     Talks on phone: None     Gets together: None     Attends [employer] service: None     Active member of club or organization: None     Attends meetings of clubs or organizations: None     Relationship status: None    Intimate partner violence     Fear of current or ex partner: None     Emotionally abused: None     Physically abused: None     Forced sexual activity: None   Other Topics Concern    None   Social History Narrative    None       Family History   Problem Relation Age of Onset    Heart disease Mother     Asthma Mother     Emphysema Mother     Hypertension Mother     Diabetes Father     Lung cancer Maternal Uncle     Breast cancer Cousin        Allergies   Allergen Reactions    Sulfa Antibiotics Rash, Fever and Tongue Swelling    Allopurinol          Current Outpatient Medications:     amLODIPine (NORVASC) 10 mg tablet, take 1 tablet by mouth once daily, Disp: 90 tablet, Rfl: 1    aspirin 81 MG tablet, Take 81 mg by mouth daily, Disp: , Rfl:     atenolol (TENORMIN) 50 mg tablet, Take 1 tablet (50 mg total) by mouth daily, Disp: 90 tablet, Rfl: 1    atropine (ISOPTO ATROPINE) 1 % ophthalmic solution, instill 1 drop into right eye twice a day AFTER SURGERY, Disp: , Rfl:     benazepril (LOTENSIN) 20 mg tablet, Take 1 tablet (20 mg total) by mouth daily, Disp: 90 tablet, Rfl: 1    COMBIGAN 0 2-0 5 %, 1 drop every 12 (twelve) hours 1 drop each eye twice daily, Disp: , Rfl:     diclofenac sodium (VOLTAREN) 1 %, Apply 2 g topically 2 (two) times a day as needed (pain), Disp: 100 g, Rfl: 2    glucose blood (ONE TOUCH ULTRA TEST) test strip, 1 each by Other route daily Use as instructed, Disp: 100 each, Rfl: 3    hydrochlorothiazide (HYDRODIURIL) 25 mg tablet, take 1 tablet by mouth once daily, Disp: 90 tablet, Rfl: 1    Januvia 50 MG tablet, take 1 tablet by mouth once daily, Disp: 30 tablet, Rfl: 3    magnesium Oxide (MAG-OX) 400 mg TABS, Take 400 mg by mouth daily, Disp: , Rfl:     metFORMIN (GLUCOPHAGE) 1000 MG tablet, take 1 tablet by mouth twice a day WITH A MEAL, Disp: 180 tablet, Rfl: 1    ONETOUCH DELICA LANCETS FINE MISC, by Does not apply route daily, Disp: 50 each, Rfl: 2    prednisoLONE acetate (PRED FORTE) 1 % ophthalmic suspension, instill 1 drop into right eye EVERY 1-2 HOURS AFTER SURGERY, Disp: , Rfl:     RHOPRESSA 0 02 % SOLN, daily at bedtime 1 Drop each eye at bedtime, Disp: , Rfl:     simvastatin (ZOCOR) 20 mg tablet, take 1 tablet by mouth at bedtime, Disp: 90 tablet, Rfl: 1    Vyzulta 0 024 % SOLN, instill 1 drop into both eyes every NIGHT, Disp: , Rfl:       Physical Exam:  BP (!) 172/88 (BP Location: Left arm, Patient Position: Sitting, Cuff Size: Adult)   Pulse 82   Temp 98 1 °F (36 7 °C) (Tympanic)   Resp 20   Ht 5' 7" (1 702 m)   Wt 88 kg (194 lb 1 6 oz)   SpO2 97%   BMI 30 40 kg/m²     Physical Exam  Constitutional:       Appearance: He is well-developed  He is obese  HENT:      Head: Normocephalic and atraumatic  Eyes:      General: No scleral icterus  Right eye: No discharge  Left eye: No discharge  Conjunctiva/sclera: Conjunctivae normal       Pupils: Pupils are equal, round, and reactive to light  Neck:      Musculoskeletal: Normal range of motion and neck supple  Thyroid: No thyromegaly  Trachea: No tracheal deviation  Cardiovascular:      Rate and Rhythm: Normal rate and regular rhythm  Heart sounds: Normal heart sounds  No murmur  No friction rub  Pulmonary:      Effort: Pulmonary effort is normal  No respiratory distress  Breath sounds: Normal breath sounds  No wheezing or rales  Chest:      Chest wall: No tenderness  Abdominal:      General: There is no distension  Palpations: Abdomen is soft  There is no hepatomegaly or splenomegaly  Tenderness: There is no abdominal tenderness  There is no guarding or rebound  Comments: Obese abdomen   Musculoskeletal: Normal range of motion  General: No tenderness or deformity  Lymphadenopathy:      Cervical: No cervical adenopathy  Skin:     General: Skin is warm and dry  Coloration: Skin is not pale  Findings: No erythema or rash  Neurological:      Mental Status: He is alert and oriented to person, place, and time  Cranial Nerves: No cranial nerve deficit  Coordination: Coordination normal       Deep Tendon Reflexes: Reflexes are normal and symmetric   Reflexes normal    Psychiatric:         Behavior: Behavior normal          Thought Content: Thought content normal          Judgment: Judgment normal            Labs:  Lab Results   Component Value Date    WBC 9 33 01/29/2021    HGB 13 9 01/29/2021    HCT 42 2 01/29/2021    MCV 88 01/29/2021     (L) 01/29/2021     Lab Results   Component Value Date     12/30/2015    K 4 0 01/29/2021     01/29/2021    CO2 32 01/29/2021    ANIONGAP 10 12/30/2015    BUN 11 01/29/2021    CREATININE 0 69 01/29/2021    GLUCOSE 130 12/30/2015    GLUF 127 (H) 12/02/2020    CALCIUM 11 1 (H) 01/29/2021    CORRECTEDCA 10 7 (H) 07/28/2020    AST 41 01/29/2021    ALT 73 01/29/2021    ALKPHOS 52 01/29/2021    EGFR 96 01/29/2021     No results found for: TSH    Patient voiced understanding and agreement in the above discussion  Aware to contact our office with questions/symptoms in the interim

## 2021-02-16 NOTE — PROGRESS NOTES
ASSESSMENT/PLAN:    Diagnoses and all orders for this visit:    Trochanteric bursitis of right hip    Other orders  -     Large joint arthrocentesis         the patient's right trochanteric bursa was injected with Depo-Medrol and Marcaine  He tolerated the injection quite well  He will follow up with our office in 3 months  The patient is acceptable to this plan  Return in about 3 months (around 5/16/2021)  _____________________________________________________  CHIEF COMPLAINT:  Chief Complaint   Patient presents with    Right Hip - Follow-up         SUBJECTIVE:  Oli Forrest is a 79 y o  male  With a history of trochanteric bursitis of his right hip  He presents to our office complaining of right hip pain  He would like a corticosteroid injection today  He denies any numbness or tingling  He denies any fever or chills  He denies any new falls or trauma      The following portions of the patient's history were reviewed and updated as appropriate: allergies, current medications, past family history, past medical history, past social history, past surgical history and problem list     PAST MEDICAL HISTORY:  Past Medical History:   Diagnosis Date    Cerebral vascular disease     Diabetes mellitus ([facility] )     Glaucoma     Hyperlipidemia     Hypertension     Lymphoma ([facility] )        PAST SURGICAL HISTORY:  Past Surgical History:   Procedure Laterality Date    CATARACT EXTRACTION      COLONOSCOPY      GANGLION CYST EXCISION      GLAUCOMA SURGERY Right 2019    SKIN LESION EXCISION         FAMILY HISTORY:  Family History   Problem Relation Age of Onset    Heart disease Mother     Asthma Mother     Emphysema Mother     Hypertension Mother     Diabetes Father     Lung cancer Maternal Uncle     Breast cancer Cousin        SOCIAL HISTORY:  Social History     Tobacco Use    Smoking status: Former Smoker     Types: Cigars    Smokeless tobacco: Never Used   Substance Use Topics    Alcohol use: Yes     Alcohol/week: 1 0 standard drinks     Types: 1 Cans of beer per week     Comment: rarely    Drug use: No       MEDICATIONS:    Current Outpatient Medications:     amLODIPine (NORVASC) 10 mg tablet, take 1 tablet by mouth once daily, Disp: 90 tablet, Rfl: 1    aspirin 81 MG tablet, Take 81 mg by mouth daily, Disp: , Rfl:     atenolol (TENORMIN) 50 mg tablet, Take 1 tablet (50 mg total) by mouth daily, Disp: 90 tablet, Rfl: 1    atropine (ISOPTO ATROPINE) 1 % ophthalmic solution, instill 1 drop into right eye twice a day AFTER SURGERY, Disp: , Rfl:     benazepril (LOTENSIN) 20 mg tablet, Take 1 tablet (20 mg total) by mouth daily, Disp: 90 tablet, Rfl: 1    COMBIGAN 0 2-0 5 %, 1 drop every 12 (twelve) hours 1 drop each eye twice daily, Disp: , Rfl:     diclofenac sodium (VOLTAREN) 1 %, Apply 2 g topically 2 (two) times a day as needed (pain), Disp: 100 g, Rfl: 2    glucose blood (ONE TOUCH ULTRA TEST) test strip, 1 each by Other route daily Use as instructed, Disp: 100 each, Rfl: 3    hydrochlorothiazide (HYDRODIURIL) 25 mg tablet, take 1 tablet by mouth once daily, Disp: 90 tablet, Rfl: 1    Januvia 50 MG tablet, take 1 tablet by mouth once daily, Disp: 30 tablet, Rfl: 3    magnesium Oxide (MAG-OX) 400 mg TABS, Take 400 mg by mouth daily, Disp: , Rfl:     metFORMIN (GLUCOPHAGE) 1000 MG tablet, take 1 tablet by mouth twice a day WITH A MEAL, Disp: 180 tablet, Rfl: 1    ONETOUCH DELICA LANCETS FINE MISC, by Does not apply route daily, Disp: 50 each, Rfl: 2    prednisoLONE acetate (PRED FORTE) 1 % ophthalmic suspension, instill 1 drop into right eye EVERY 1-2 HOURS AFTER SURGERY, Disp: , Rfl:     RHOPRESSA 0 02 % SOLN, daily at bedtime 1 Drop each eye at bedtime, Disp: , Rfl:     simvastatin (ZOCOR) 20 mg tablet, take 1 tablet by mouth at bedtime, Disp: 90 tablet, Rfl: 1    Vyzulta 0 024 % SOLN, instill 1 drop into both eyes every NIGHT, Disp: , Rfl:     ALLERGIES:  Allergies   Allergen Reactions    Sulfa Antibiotics Rash, Fever and Tongue Swelling    Allopurinol        ROS:  Review of Systems     Constitutional: Negative for fatigue, fever or loss of appetite  HENT: Negative  Respiratory: Negative for shortness of breath, dyspnea  Cardiovascular: Negative for chest pain/tightness  Gastrointestinal: Negative for abdominal pain, N/V  Endocrine: Negative for cold/heat intolerance, unexplained weight loss/gain  Genitourinary: Negative for flank pain, dysuria, hematuria  Musculoskeletal: Positive for arthralgia   Skin: Negative for rash  Neurological: Negative for numbness or tingling  Psychiatric/Behavioral: Negative for agitation  _____________________________________________________  PHYSICAL EXAMINATION:    Blood pressure 157/75, pulse 79, height 5' 7" (1 702 m), weight 88 kg (194 lb)  Constitutional: Oriented to person, place, and time  Appears well-developed and well-nourished  No distress  HENT:   Head: Normocephalic  Eyes: Conjunctivae are normal  Right eye exhibits no discharge  Left eye exhibits no discharge  No scleral icterus  Cardiovascular: Normal rate  Pulmonary/Chest: Effort normal    Neurological: Alert and oriented to person, place, and time  Skin: Skin is warm and dry  No rash noted  Not diaphoretic  No erythema  No pallor  Psychiatric: Normal mood and affect   Behavior is normal  Judgment and thought content normal       MUSCULOSKELETAL EXAMINATION:   Physical Exam  Ortho Exam      Right lower extremity is neurovascularly intact  Toes are pink mobile   Compartments are soft   No warmth, erythema or ecchymosis present   Tenderness to palpation of the right trochanteric bursa   Range of motion of the hip is intact   Brisk cap refill  Sensation intact  Negative Homans  Objective:  BP Readings from Last 1 Encounters:   02/16/21 157/75      Wt Readings from Last 1 Encounters:   02/16/21 88 kg (194 lb)        BMI:   Estimated body mass index is 30 38 kg/m² as calculated from the following:    Height as of this encounter: 5' 7" (1 702 m)  Weight as of this encounter: 88 kg (194 lb)        PROCEDURES PERFORMED:  Large joint arthrocentesis: R greater trochanteric bursa  Universal Protocol:  Consent given by: patient  Site marked: the operative site was marked  Supporting Documentation  Indications: pain   Procedure Details  Location: hip - R greater trochanteric bursa  Needle size: 22 G  Approach: lateral  Medications administered: 4 mL bupivacaine 0 25 %; 2 mL methylPREDNISolone acetate 40 mg/mL    Patient tolerance: patient tolerated the procedure well with no immediate complications  Dressing:  Sterile dressing applied            Herve Chun PA-C

## 2021-03-03 PROBLEM — E11.29 TYPE 2 DIABETES MELLITUS WITH KIDNEY COMPLICATION, WITHOUT LONG-TERM CURRENT USE OF INSULIN (HCC): Status: RESOLVED | Noted: 2020-01-01 | Resolved: 2021-01-01

## 2021-03-03 PROBLEM — N18.2 DIABETES MELLITUS DUE TO UNDERLYING CONDITION WITH STAGE 2 CHRONIC KIDNEY DISEASE, WITH LONG-TERM CURRENT USE OF INSULIN (HCC): Status: ACTIVE | Noted: 2018-08-09

## 2021-03-03 NOTE — PROGRESS NOTES
[facility]        NAME: Carmen Chacon is a [address] y o  male  : 1950    MRN: [id number]  DATE: 2021  TIME: 8:50 AM    Assessment and Plan   Hyperparathyroidism ([facility] [address] ) [E21 3]  1  Hyperparathyroidism ([facility] [address] )  PTH, intact    Uric acid    Phosphorus    Comprehensive metabolic panel   2  Elevated uric acid in blood  PTH, intact    Uric acid    Phosphorus    Comprehensive metabolic panel   3  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, with long-term current use of insulin ([facility])  HEMOGLOBIN A1C W/ EAG ESTIMATION   4  Small cell b-cell lymphoma, lymph nodes of head, face, and neck (HCC)     5  Hyperlipidemia, unspecified hyperlipidemia type     6  Hypomagnesemia  Magnesium     1  Hyperparathyroidism  recheck levels after stopping HCTZ  - PTH, intact; Future  - Uric acid; Future  - Phosphorus; Future  - Comprehensive metabolic panel; Future    2  Elevated uric acid in blood   stop HCTZ, recheck labs in 2 weeks  - PTH, intact; Future  - Uric acid; Future  - Phosphorus; Future  - Comprehensive metabolic panel; Future    3  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, with long-term current use of insulin (HCC)   Well controlled  Continue current medication    4  Small cell b-cell lymphoma, lymph nodes of head, face, and neck (HCC)      5  Hyperlipidemia, unspecified hyperlipidemia type  Continue simvastatin    6  Hypomagnesemia   Patient requesting this to be rechecked  - Magnesium; Future    Diabetic foot exam due at next visit with AWV  Get labs done in 2 weeks  Patient Instructions     Patient Instructions   Stop the HCTZ  Get labs done in 2-3 weeks  Follow up with in 4 weeks for pre operative clearance  Chief Complaint     Chief Complaint   Patient presents with    Follow-up     dm         History of Present Illness       Patient here for 3 month follow up visit for DM2       Was recently seen by Hem/Onc for his history of small B-cell lymphoma  Was noted to have elevated PTH and elevated uric acid which they wanted addressed by the PCP  Will have patient stop his HCTZ and recheck these levels in 2 weeks as he could have primary hyperparathyroidism  Patient reports that he is feeling good, denies any complaints today  Diabetes  He presents for his follow-up diabetic visit  He has type 2 diabetes mellitus  His disease course has been stable  There are no hypoglycemic associated symptoms  Pertinent negatives for hypoglycemia include no dizziness or headaches  There are no diabetic associated symptoms  Pertinent negatives for diabetes include no chest pain and no fatigue  Current diabetic treatment includes diet and oral agent (dual therapy)  He is compliant with treatment all of the time  There is no change in his home blood glucose trend  Review of Systems   Review of Systems   Constitutional: Negative  Negative for fatigue and fever  Respiratory: Negative  Negative for shortness of breath  Cardiovascular: Negative  Negative for chest pain and palpitations  Musculoskeletal: Negative  Negative for arthralgias and back pain  Neurological: Negative  Negative for dizziness, light-headedness, numbness and headaches  Psychiatric/Behavioral: Negative          PHQ-9 Depression Screening    PHQ-9:   Frequency of the following problems over the past two weeks:      Little interest or pleasure in doing things: 0 - not at all  Feeling down, depressed, or hopeless: 0 - not at all  PHQ-2 Score: 0        Current Medications       Current Outpatient Medications:     amLODIPine (NORVASC) 10 mg tablet, take 1 tablet by mouth once daily, Disp: 90 tablet, Rfl: 1    aspirin 81 MG tablet, Take 81 mg by mouth daily, Disp: , Rfl:     atenolol (TENORMIN) 50 mg tablet, Take 1 tablet (50 mg total) by mouth daily, Disp: 90 tablet, Rfl: 1    atropine (ISOPTO ATROPINE) 1 % ophthalmic solution, instill 1 drop into right eye twice a day AFTER SURGERY, Disp: , Rfl:     benazepril (LOTENSIN) 20 mg tablet, Take 1 tablet (20 mg total) by mouth daily, Disp: 90 tablet, Rfl: 1    COMBIGAN 0 2-0 5 %, 1 drop every 12 (twelve) hours 1 drop each eye twice daily, Disp: , Rfl:     diclofenac sodium (VOLTAREN) 1 %, Apply 2 g topically 2 (two) times a day as needed (pain), Disp: 100 g, Rfl: 2    glucose blood (ONE TOUCH ULTRA TEST) test strip, 1 each by Other route daily Use as instructed, Disp: 100 each, Rfl: 3    hydrochlorothiazide (HYDRODIURIL) 25 mg tablet, take 1 tablet by mouth once daily, Disp: 90 tablet, Rfl: 1    Januvia 50 MG tablet, take 1 tablet by mouth once daily, Disp: 30 tablet, Rfl: 3    magnesium Oxide (MAG-OX) 400 mg TABS, Take 400 mg by mouth daily, Disp: , Rfl:     metFORMIN (GLUCOPHAGE) 1000 MG tablet, take 1 tablet by mouth twice a day WITH A MEAL, Disp: 180 tablet, Rfl: 1    ONETOUCH DELICA LANCETS FINE MISC, by Does not apply route daily, Disp: 50 each, Rfl: 2    prednisoLONE acetate (PRED FORTE) 1 % ophthalmic suspension, instill 1 drop into right eye EVERY 1-2 HOURS AFTER SURGERY, Disp: , Rfl:     RHOPRESSA 0 02 % SOLN, daily at bedtime 1 Drop each eye at bedtime, Disp: , Rfl:     simvastatin (ZOCOR) 20 mg tablet, take 1 tablet by mouth at bedtime, Disp: 90 tablet, Rfl: 1    Vyzulta 0 024 % SOLN, instill 1 drop into both eyes every NIGHT, Disp: , Rfl:     Current Allergies     Allergies as of 03/03/2021 - Reviewed 03/03/2021   Allergen Reaction Noted    Sulfa antibiotics Rash, Fever, and Tongue Swelling 08/09/2018    Allopurinol  08/09/2018            The following portions of the patient's history were reviewed and updated as appropriate: allergies, current medications, past family history, past medical history, past social history, past surgical history and problem list      Past Medical History:   Diagnosis Date    Cerebral vascular disease     Diabetes mellitus ([facility] [address] )     Glaucoma     Hyperlipidemia     Hypertension     Lymphoma ([facility] [address] )        Past Surgical History:   Procedure Laterality Date    CATARACT EXTRACTION      COLONOSCOPY      GANGLION CYST EXCISION      GLAUCOMA SURGERY Right 2019    SKIN LESION EXCISION         Family History   Problem Relation Age of Onset    Heart disease Mother     Asthma Mother     Emphysema Mother     Hypertension Mother     Diabetes Father     Lung cancer Maternal Uncle     Breast cancer Cousin          Medications have been verified  Objective   /70   Pulse 74   Temp 98 9 °F (37 2 °C)   Resp 20   Ht 5' 7" (1 702 m)   Wt 87 6 kg (193 lb 3 2 oz)   SpO2 100%   BMI 30 26 kg/m²        Physical Exam     Physical Exam  Vitals signs and nursing note reviewed  Constitutional:       General: He is not in acute distress  Appearance: Normal appearance  He is well-developed  He is not ill-appearing  Eyes:      General: Lids are normal    Cardiovascular:      Rate and Rhythm: Normal rate and regular rhythm  Heart sounds: Normal heart sounds, S1 normal and S2 normal  No murmur  Pulmonary:      Effort: Pulmonary effort is normal  No respiratory distress  Breath sounds: Normal breath sounds  No decreased breath sounds or wheezing  Musculoskeletal: Normal range of motion  General: No tenderness or deformity  Skin:     General: Skin is warm  Findings: No erythema or rash  Neurological:      Mental Status: He is alert and oriented to person, place, and time  Psychiatric:         Behavior: Behavior normal  Behavior is cooperative  Thought Content:  Thought content normal

## 2021-03-09 NOTE — TELEPHONE ENCOUNTER
Patient called wanting a script for a Blood pressure monitor Kit sent to [facility], Any Questions call Patient [phone]

## 2021-03-17 NOTE — TELEPHONE ENCOUNTER
What are the most recent BP readings? I am okay with the 130's/60-70's but if the SBP of 140's are the most recent we will need to increase his BP medication as we had stopped the HCTZ

## 2021-03-17 NOTE — TELEPHONE ENCOUNTER
Spoke to patient, he states all 4 of these reading were from this morning   Please call him  Back with any other questions or recommendations

## 2021-03-31 NOTE — TELEPHONE ENCOUNTER
----- Message from ALIYAH Villalba sent at 3/31/2021  7:49 AM EDT -----  Make patient aware his uric acid level has improved  Calcium has slightly improved but the PTH level has increased with stopping his thiazide medication  I want him to follow up with endocrinology for hyperparathyroidism  I would also like him to get a Dexa scan done       Please enter Dexa order with diagnosis of osteoporosis  and Endo order for Ann Morrell in [place] with diagnosis of hyperparathyroidism

## 2021-04-01 PROBLEM — E21.3 HYPERPARATHYROIDISM (HCC): Status: ACTIVE | Noted: 2021-01-01

## 2021-04-01 NOTE — PROGRESS NOTES
[facility]        NAME: Brandy Beach is a 79 y o  male  : 1950    MRN: [id number]  DATE: 2021  TIME: 12:35 PM    Assessment and Plan   Pre-operative clearance [Z01 818]  1  Pre-operative clearance     2  Glaucoma of right eye, unspecified glaucoma type     3  Hyperparathyroidism ([address] )     4  Hypertension, unspecified type  benazepril (LOTENSIN) 40 MG tablet   1  Pre-operative clearance      2  Glaucoma of right eye, unspecified glaucoma type  Surgery scheduled for 21  Patient is low risk for surgery  3  Hyperparathyroidism ([facility] [address] )  Endocrine referral  Elevated PTH of 142 6, that went up after stopping the HCTZ  4  Hypertension, unspecified type   increase benazepril because HCTZ was stopped previously  Reports home SBP between 120's-150's  Take 2 tablets of the 20mg until you get new 40mg tablets of the benazepril    - benazepril (LOTENSIN) 40 MG tablet; Take 1 tablet (40 mg total) by mouth daily  Dispense: 90 tablet; Refill: 1        Patient Instructions     Patient Instructions      Do not restart HCTZ ( hydrochlorothiazide)  Start taking benazepril 40 mg a day, Take 2 tablets of the 20 mg pills to equal the 40 mg per day  I sent in a new prescription for the 40 mg tablets  Follow up with Endocrinology as discussed, call to schedule and appointment  Get Dexa scan completed  Hyperparathyroidism   AMBULATORY CARE:   Hyperparathyroidism  is a condition that causes your parathyroid glands to make too much parathyroid hormone (PTH)  The parathyroid glands are 4 small glands located near the thyroid gland in your neck  PTH keeps the level of calcium balanced in your blood  High levels of PTH causes too much calcium to build up in your blood  High calcium levels can cause health problems such as osteoporosis (weak, brittle bones), high blood pressure, and kidney stones  Common signs and symptoms of hyperparathyroidism include the following:   You may have no signs or symptoms or you may have any of the following:  · Muscle weakness    · Fatigue    · Depression or anxiety    · General body aches and pains    · Bone and joint pain    · Loss of appetite    · Nausea, vomiting, or abdominal pain    · Constipation    · Confusion or forgetfulness     · Increased thirst and urination    Seek care immediately if:   · You heart beats faster or slower than normal, or it feels like fluttering in your chest     · You have nausea, vomiting, and abdominal pain  · You cannot think clearly  Contact your healthcare provider if:   · You have bone and joint pain  · You have increased thirst or you are urinating more often than usual     · You have a loss of appetite  · You have questions or concerns about your condition or care  Treatment:  You may not need any treatment if you do not have symptoms  Your healthcare provider may monitor your condition through regular visits and blood tests  You may need medicines to control the amount of PTH your parathyroid glands make  You may also need medicine to keep your bones strong  Surgery may be done to remove an adenoma or your parathyroid glands  Manage hyperparathyroidism: You may need to do the following if you will not have surgery to treat your hyperparathyroidism  · Limit your calcium intake  Your healthcare provider may tell you to limit your intake to less than 1200 mg each day  You may also need to limit vitamin D to less than 600 IU each day  · Drink liquids as directed  Liquids can help prevent kidney stones  Ask how much liquid to drink each day and which liquids are best for you  · Exercise regularly  Ask your healthcare provider about the best exercise plan for you  Exercise can help build and strengthen your bones  Follow up with your healthcare provider as directed:  Write down your questions so you remember to ask them during your visits     © Copyright [employer] 2020 Information is for End User's use only and may not be sold, redistributed or otherwise used for commercial purposes  All illustrations and images included in CareNotes® are the copyrighted property of A D A M , Inc  or Dirk Le  The above information is an  only  It is not intended as medical advice for individual conditions or treatments  Talk to your doctor, nurse or pharmacist before following any medical regimen to see if it is safe and effective for you  Chief Complaint     Chief Complaint   Patient presents with    Pre-op Exam     having glaucoma surgery on 4/13/21         History of Present Illness       Presurgical Evaluation     Patient ID: Caitlyn Jeronimo is a 79 y o  male      Patient presents with:  Pre-op Clearance     Procedure date: April 13, 2021  Surgeon:  Dr Kath Harris  Planned procedure:  Laser Surgery   Diagnosis for procedure:  Glaucoma  Reports that he has trouble seeing out of the right eye  Surgery is on the right eye  Anesthesia is local   History of Type 2 Dm, HTN, CVD, HLD, recent (11/2020) radiation treatment of lymphoma to lymph node of head, neck and face       Allergies on file:   Sulfa antibiotics and Allopurinol     Preop labs/testing available and reviewed: no  EKG no  Echo no      Review of Systems   Review of Systems   Constitutional: Negative  Eyes: Positive for visual disturbance  Negative for photophobia and redness  Respiratory: Negative  Cardiovascular: Negative  Neurological: Negative          PHQ-9 Depression Screening    PHQ-9:   Frequency of the following problems over the past two weeks:      Little interest or pleasure in doing things: 0 - not at all  Feeling down, depressed, or hopeless: 0 - not at all  PHQ-2 Score: 0        Current Medications       Current Outpatient Medications:     amLODIPine (NORVASC) 10 mg tablet, take 1 tablet by mouth once daily, Disp: 90 tablet, Rfl: 1    aspirin 81 MG tablet, Take 81 mg by mouth daily, Disp: , Rfl:     atenolol (TENORMIN) 50 mg tablet, Take 1 tablet (50 mg total) by mouth daily, Disp: 90 tablet, Rfl: 1    atropine (ISOPTO ATROPINE) 1 % ophthalmic solution, instill 1 drop into right eye twice a day AFTER SURGERY, Disp: , Rfl:     benazepril (LOTENSIN) 40 MG tablet, Take 1 tablet (40 mg total) by mouth daily, Disp: 90 tablet, Rfl: 1    Blood Pressure Monitor KIT, Use daily, Disp: 1 each, Rfl: 0    COMBIGAN 0 2-0 5 %, 1 drop every 12 (twelve) hours 1 drop each eye twice daily, Disp: , Rfl:     diclofenac sodium (VOLTAREN) 1 %, Apply 2 g topically 2 (two) times a day as needed (pain), Disp: 100 g, Rfl: 2    glucose blood (ONE TOUCH ULTRA TEST) test strip, 1 each by Other route daily Use as instructed, Disp: 100 each, Rfl: 3    hydrochlorothiazide (HYDRODIURIL) 25 mg tablet, take 1 tablet by mouth once daily, Disp: 90 tablet, Rfl: 1    Januvia 50 MG tablet, take 1 tablet by mouth once daily, Disp: 30 tablet, Rfl: 3    magnesium Oxide (MAG-OX) 400 mg TABS, Take 400 mg by mouth daily, Disp: , Rfl:     metFORMIN (GLUCOPHAGE) 1000 MG tablet, take 1 tablet by mouth twice a day WITH A MEAL, Disp: 180 tablet, Rfl: 1    ONETOUCH DELICA LANCETS FINE MISC, by Does not apply route daily, Disp: 50 each, Rfl: 2    prednisoLONE acetate (PRED FORTE) 1 % ophthalmic suspension, instill 1 drop into right eye EVERY 1-2 HOURS AFTER SURGERY, Disp: , Rfl:     RHOPRESSA 0 02 % SOLN, daily at bedtime 1 Drop each eye at bedtime, Disp: , Rfl:     simvastatin (ZOCOR) 20 mg tablet, take 1 tablet by mouth at bedtime, Disp: 90 tablet, Rfl: 1    Vyzulta 0 024 % SOLN, instill 1 drop into both eyes every NIGHT, Disp: , Rfl:     Current Allergies     Allergies as of 04/01/2021 - Reviewed 04/01/2021   Allergen Reaction Noted    Sulfa antibiotics Rash, Fever, and Tongue Swelling 08/09/2018    Allopurinol  08/09/2018            The following portions of the patient's history were reviewed and updated as appropriate: allergies, current medications, past family history, past medical history, past social history, past surgical history and problem list      Past Medical History:   Diagnosis Date    Cerebral vascular disease     Diabetes mellitus ([facility] [address] )     Glaucoma     Hyperlipidemia     Hypertension     Lymphoma ([facility] [address] )        Past Surgical History:   Procedure Laterality Date    CATARACT EXTRACTION      COLONOSCOPY      GANGLION CYST EXCISION      GLAUCOMA SURGERY Right 2019    SKIN LESION EXCISION         Family History   Problem Relation Age of Onset    Heart disease Mother     Asthma Mother     Emphysema Mother     Hypertension Mother     Diabetes Father     Lung cancer Maternal Uncle     Breast cancer Cousin          Medications have been verified  Objective   /80   Pulse 77   Temp 98 3 °F (36 8 °C)   Resp 20   Ht 5' 7" (1 702 m)   Wt 89 2 kg (196 lb 9 6 oz)   SpO2 100%   BMI 30 79 kg/m²        Physical Exam     Physical Exam  Vitals signs and nursing note reviewed  Constitutional:       General: He is not in acute distress  Appearance: Normal appearance  He is well-developed  He is not ill-appearing  HENT:      Right Ear: Tympanic membrane and ear canal normal       Left Ear: Tympanic membrane and ear canal normal       Nose: Nose normal    Eyes:      General: Lids are normal       Conjunctiva/sclera:      Right eye: Right conjunctiva is injected  Left eye: Left conjunctiva is injected  Comments: B/l conjunctiva with mild injection  Patient reports he has allergies  Cardiovascular:      Rate and Rhythm: Normal rate and regular rhythm  Heart sounds: Normal heart sounds, S1 normal and S2 normal    Pulmonary:      Effort: Pulmonary effort is normal  No respiratory distress  Breath sounds: Normal breath sounds  No decreased breath sounds or wheezing  Abdominal:      General: Bowel sounds are normal       Palpations: Abdomen is soft     Musculoskeletal: Normal range of motion  General: No tenderness or deformity  Skin:     General: Skin is warm  Findings: No erythema or rash  Neurological:      Mental Status: He is alert and oriented to person, place, and time  Psychiatric:         Behavior: Behavior normal  Behavior is cooperative  Thought Content:  Thought content normal

## 2021-04-01 NOTE — PATIENT INSTRUCTIONS
Do not restart HCTZ ( hydrochlorothiazide)  Start taking benazepril 40 mg a day, Take 2 tablets of the 20 mg pills to equal the 40 mg per day  I sent in a new prescription for the 40 mg tablets  Follow up with Endocrinology as discussed, call to schedule and appointment  Get Dexa scan completed  Hyperparathyroidism   AMBULATORY CARE:   Hyperparathyroidism  is a condition that causes your parathyroid glands to make too much parathyroid hormone (PTH)  The parathyroid glands are 4 small glands located near the thyroid gland in your neck  PTH keeps the level of calcium balanced in your blood  High levels of PTH causes too much calcium to build up in your blood  High calcium levels can cause health problems such as osteoporosis (weak, brittle bones), high blood pressure, and kidney stones  Common signs and symptoms of hyperparathyroidism include the following: You may have no signs or symptoms or you may have any of the following:  · Muscle weakness    · Fatigue    · Depression or anxiety    · General body aches and pains    · Bone and joint pain    · Loss of appetite    · Nausea, vomiting, or abdominal pain    · Constipation    · Confusion or forgetfulness     · Increased thirst and urination    Seek care immediately if:   · You heart beats faster or slower than normal, or it feels like fluttering in your chest     · You have nausea, vomiting, and abdominal pain  · You cannot think clearly  Contact your healthcare provider if:   · You have bone and joint pain  · You have increased thirst or you are urinating more often than usual     · You have a loss of appetite  · You have questions or concerns about your condition or care  Treatment:  You may not need any treatment if you do not have symptoms  Your healthcare provider may monitor your condition through regular visits and blood tests  You may need medicines to control the amount of PTH your parathyroid glands make   You may also need medicine to keep your bones strong  Surgery may be done to remove an adenoma or your parathyroid glands  Manage hyperparathyroidism: You may need to do the following if you will not have surgery to treat your hyperparathyroidism  · Limit your calcium intake  Your healthcare provider may tell you to limit your intake to less than 1200 mg each day  You may also need to limit vitamin D to less than 600 IU each day  · Drink liquids as directed  Liquids can help prevent kidney stones  Ask how much liquid to drink each day and which liquids are best for you  · Exercise regularly  Ask your healthcare provider about the best exercise plan for you  Exercise can help build and strengthen your bones  Follow up with your healthcare provider as directed:  Write down your questions so you remember to ask them during your visits  © Copyright [address] Information is for End User's use only and may not be sold, redistributed or otherwise used for commercial purposes  All illustrations and images included in CareNotes® are the copyrighted property of A D A M , Inc  or [address] [place], [place] [address]   The above information is an  only  It is not intended as medical advice for individual conditions or treatments  Talk to your doctor, nurse or pharmacist before following any medical regimen to see if it is safe and effective for you

## 2021-04-06 NOTE — TELEPHONE ENCOUNTER
Romana Sanders called and is questioning the DXA body comp analysis  The insurance will not cover and it is only done in 2 places    Or did you just want a regular dexa? ??    Please advise    He stated he cannot afford the test you are requesting    BEQLV:[phone]    Let him know you decision

## 2021-04-06 NOTE — TELEPHONE ENCOUNTER
I cosigned the order that was place, the wrong order was put in  It is not supposed to be the complete  It is supposed to be dexa spine/pelvis

## 2021-04-09 NOTE — TELEPHONE ENCOUNTER
Called and left detailed message with patient informing him that new dexa has been ordered and he can contact central scheduling to schedule  Advised to contact office if any questions / concerns  Incorrect order canceled

## 2021-04-13 PROBLEM — K92.2 GIB (GASTROINTESTINAL BLEEDING): Status: ACTIVE | Noted: 2021-01-01

## 2021-04-13 PROBLEM — G93.40 ENCEPHALOPATHY: Status: ACTIVE | Noted: 2021-01-01

## 2021-04-13 PROBLEM — E83.42 HYPOMAGNESEMIA: Status: ACTIVE | Noted: 2021-01-01

## 2021-04-13 PROBLEM — R77.8 ELEVATED TROPONIN: Status: RESOLVED | Noted: 2021-01-01 | Resolved: 2021-01-01

## 2021-04-13 PROBLEM — N17.9 AKI (ACUTE KIDNEY INJURY) (HCC): Status: ACTIVE | Noted: 2021-01-01

## 2021-04-13 PROBLEM — R77.8 ELEVATED TROPONIN: Status: ACTIVE | Noted: 2021-01-01

## 2021-04-13 PROBLEM — E87.0 HYPERNATREMIA: Status: ACTIVE | Noted: 2021-01-01

## 2021-04-13 PROBLEM — K72.00 SHOCK LIVER: Status: ACTIVE | Noted: 2021-01-01

## 2021-04-13 PROBLEM — R65.21 SEPTIC SHOCK (HCC): Status: ACTIVE | Noted: 2021-01-01

## 2021-04-13 PROBLEM — H40.9 GLAUCOMA: Status: ACTIVE | Noted: 2021-01-01

## 2021-04-13 PROBLEM — J96.00 ACUTE RESPIRATORY FAILURE (HCC): Status: ACTIVE | Noted: 2021-01-01

## 2021-04-13 PROBLEM — A41.9 SEPTIC SHOCK (HCC): Status: ACTIVE | Noted: 2021-01-01

## 2021-04-13 PROBLEM — L89.154 SACRAL DECUBITUS ULCER, STAGE IV (HCC): Status: ACTIVE | Noted: 2021-01-01

## 2021-04-13 NOTE — ASSESSMENT & PLAN NOTE
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- POA  · General Surgery consulted and will need debridement- likely several   · Presented with encephalopathy, RED, lactic acidosis, respiratory distress and hypotension  · Blood cultures, sputum and urine cultures pending  · Empiric treatment with Vancomycin and Cefepime  · MRSA pending  · COVID and Flu negative  · CT chest with possible left lower lobe pneumonia  · Currently requiring Vasopressin, Levophed, and Epi for hemodynamic support    · Received 500 cc of 5% albumin and 1 liter bolus of LR  · Received 2 amps of Bicarb   · Given 100 mg IV Solucortef and continued on 50 mg q 6 hours  · Cortisol level 56 this am  · NPO secondary to vasopressor requirements  · Trend labs closely  · Family GOC ongoing- likely going to [address] given extensive multiorgan failure and underlying comorbities

## 2021-04-13 NOTE — ED PROCEDURE NOTE
PROCEDURE  Intubation    Date/Time: 4/12/2021 9:46 PM  Performed by: Loki Odom MD  Authorized by: Loki Odom MD     Patient location:  ED  Consent:     Consent obtained:  Emergent situation  Universal protocol:     Patient identity confirmed:  Arm band  Pre-procedure details:     Patient status:  Unresponsive    Mallampati score:  2    Paralytics:  Vecuronium  Procedure details:     Intubation method:  Oral    Oral intubation technique:  Glidescope    Laryngoscope blade: Mac 3    Tube size (mm):  7 5    Tube type:  Cuffed    Number of attempts:  1    Cricoid pressure: yes      Tube visualized through cords: yes    Placement assessment:     ETT to teeth:  23    Tube secured with: Adhesive tape and ETT mccollum    Breath sounds:  Equal    Placement verification: chest rise, CXR verification, equal breath sounds and tube exhalation      CXR findings:  ETT in proper place  Post-procedure details:     Patient tolerance of procedure:   Tolerated well, no immediate complications         Loki Odom MD  04/12/21 Josafat Ramirez MD  04/12/21 [id number]

## 2021-04-13 NOTE — ASSESSMENT & PLAN NOTE
· Per family member, he was supposed to have a visit with his doctor on 4/12 for treatment  · He missed appointment due to current illness

## 2021-04-13 NOTE — ASSESSMENT & PLAN NOTE
· Workup in progress  · Blood cultures, sputum cultures and urine culture pending  · CT head, neck, abd, pelvis, c-spine negative for acute findings  CT chest with possible left lower lobe pneumonia  · Procalcitonin 5 63 with reflex  · Empiric treatment with Vancomycin and Cefepime     · Neuro checks Q2H- transition to [address]

## 2021-04-13 NOTE — ASSESSMENT & PLAN NOTE
· Unknown cause at this time  Workup in progress  · Blood cultures, sputum cultures and urine culture pending  · CT head, neck, abd, pelvis, c-spine negative for acute findings  CT chest with possible left lower lobe pneumonia  · Procalcitonin pending with reflex  · Empiric treatment with Vancomycin and Cefepime in progress  · Neuro checks Q2H

## 2021-04-13 NOTE — ASSESSMENT & PLAN NOTE
· > 2 0 on initial labs  · Repeat > 3 despite fluid resuscitation  · Considering HD/CRRT  · Consider Nephrology consult

## 2021-04-13 NOTE — PROGRESS NOTES
Vancomycin Assessment    Seth Hinton is a 79 y o  male who is currently receiving vancomycin 1250mg IV once for Pneumonia     Relevant clinical data and objective history reviewed:  Creatinine   Date Value Ref Range Status   04/13/2021 3 40 (H) 0 60 - 1 30 mg/dL Final     Comment:     Standardized to IDMS reference method   04/13/2021 2 69 (H) 0 70 - 1 30 mg/dL Final     Comment:     Standardized to IDMS reference method   04/12/2021 2 05 (H) 0 70 - 1 30 mg/dL Final     Comment:     Standardized to IDMS reference method   12/30/2015 0 78 0 60 - 1 30 mg/dL Final     Comment:     Standardized to IDMS reference method     BP 95/57   Pulse (!) 137   Temp 99 7 °F (37 6 °C)   Resp (!) 24   Wt 85 9 kg (189 lb 6 oz)   SpO2 98%   BMI 29 66 kg/m²   No intake/output data recorded  Lab Results   Component Value Date/Time    BUN 99 (H) 04/13/2021 05:42 AM    BUN 14 12/30/2015 05:16 PM    WBC 19 70 (H) 04/12/2021 10:08 PM    HGB 15 4 04/12/2021 10:08 PM    HCT 49 5 (H) 04/12/2021 10:08 PM    MCV 93 04/12/2021 10:08 PM    PLT 87 (L) 04/12/2021 10:08 PM     Temp Readings from Last 3 Encounters:   04/13/21 99 7 °F (37 6 °C)   04/13/21 98 1 °F (36 7 °C)   04/01/21 98 3 °F (36 8 °C)     Vancomycin Days of Therapy: 1    Assessment/Plan  The patient is currently on vancomycin utilizing pulse dosing based on adjusted body weight (due to obesity)  Baseline risks associated with therapy include: pre-existing renal impairment, advanced age, and dehydration  The patient is currently receiving 1250mg IV once (administered at [address]) and after clinical evaluation will be changed to 1250mg IV once, then 1000mg IV daily prn vanco level <20  Pharmacy will also follow closely for s/sx of nephrotoxicity, infusion reactions, and appropriateness of therapy  BMP and CBC will be ordered per protocol  Plan for random level at 0000 on 4/14/21    Due to infection severity, will target a trough of 15-20 (appropriate for most indications)   Pharmacy will continue to follow the patients culture results and clinical progress daily      Wandy Chavez, Pharmacist

## 2021-04-13 NOTE — ASSESSMENT & PLAN NOTE
· 0 42 on initial evaluation  · Peaked at 0 76, then down trending--> D/C lab trend  · Likely secondary to demand ischemia  No ST elevations noted on EKG

## 2021-04-13 NOTE — DISCHARGE SUMMARY
[address]  Discharge- Will Baldwin 1950, 79 y o  male MRN: [id number]  Unit/Bed#: -01 Encounter: [id number]  Primary Care Provider: LINNEA Farmer   Date and time admitted to hospital: 4/13/2021  3:53 AM    * Septic shock ([facility] [address] )  Assessment & Plan  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- POA  · General Surgery consulted and will need debridement- likely several   · Presented with encephalopathy, RED, lactic acidosis, respiratory distress and hypotension  · Blood cultures, sputum and urine cultures pending  · Empiric treatment with Vancomycin and Cefepime  · MRSA pending  · COVID and Flu negative  · CT chest with possible left lower lobe pneumonia  · Currently requiring Vasopressin, Levophed, and Epi for hemodynamic support  · Received 500 cc of 5% albumin and 1 liter bolus of LR  · Received 2 amps of Bicarb   · Given 100 mg IV Solucortef and continued on 50 mg q 6 hours  · Cortisol level 56 this am  · NPO secondary to vasopressor requirements  · Trend labs closely  · Family GOC discussion with Critical Care team, decision was made to transition to [facility] due to extensive multiorgan failure and underlying comorbities    Encephalopathy  Assessment & Plan  · Workup in progress  · Blood cultures, sputum cultures and urine culture pending  · CT head, neck, abd, pelvis, c-spine negative for acute findings  CT chest with possible left lower lobe pneumonia  · Procalcitonin 5 63 with reflex  · Empiric treatment with Vancomycin and Cefepime     · Neuro checks Q2H- transition to Comfort Care    Acute respiratory failure ([facility] [address] )  Assessment & Plan  · Intubated and sedated on APV-CMV 60%/14/8/550  · Trend ABG's and adjust vent as needed  · Terminally extubated     GIB (gastrointestinal bleeding)  Assessment & Plan  · Gastroccult positive- start on Protonix 40 gm IV BID  · Consider consult to Gastroenterology pending [address] discussion  · Trend H/H  · Transitioned to Comfort Care    Sacral decubitus ulcer, stage IV ([address] )  Assessment & Plan  · Seen by Surgery and will need debridement and extensive wound care  · Likely source of sepsis  · Multiple stage 2 pressure injuries on shoulders and feet- POA      Shock liver  Assessment & Plan  · Worsening LFT's likely due to shock  · INR elevated at 1 3  · Cont to monitor and avoid hepatotoxic meds  · Transitioned to [address]  · > 2 0 on initial labs  · Peaked at 3 4 despite fluid resuscitation  · Poor UOP  · Considering CRRT  · Nephrology consulted- discontinued due to transitioning to [address]    Hypernatremia  Assessment & Plan  · 160 on initial labs  · Likely secondary to dehydration  · Transitioned to [address]  · Per family member, he was supposed to have a visit with his doctor on 4/12 for treatment  · He missed appointment due to current illness  Hypomagnesemia  Assessment & Plan  · Appears to be chronic  · Follow with daily labs  · Repleted as necessary  Small cell b-cell lymphoma, lymph nodes of head, face, and neck (HCC)  Assessment & Plan  · Follows with outpatient hematology/Oncology  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, with long-term current use of insulin [facility])  Assessment & Plan  Lab Results   Component Value Date    HGBA1C 5 8 (H) 03/26/2021       Recent Labs     04/13/21  1246   POCGLU 219*       Blood Sugar Average: Last 72 hrs:     · Taken from chart history  · Home medications appear to be metformin and Januvia  No insulin noted in recent family medicine chart review  · Hold home meds  Continue with SSI with Q6H glucose checks  · Transitioned to Comfort Care    Hyperlipidemia  Assessment & Plan  · Taken from review of chart  · Does not appear to be on a statin at this time       Hypertension  Assessment & Plan  · Hold all BP lowering medications at this time in the setting of hypotension  · Appears to take amlodipine, atenolol, HCTZ and Lotensin as home medications- held 2/2 shock          Resolved Problems  Date Reviewed: 4/13/2021          Resolved    Elevated troponin 4/13/2021     Resolved by  Rosy Stephen PA-C          Admission Date:   Admission Orders (From admission, onward)     Ordered        04/13/21 0411  Inpatient Admission  Once                     Admitting Diagnosis: AMS (altered mental status) [R41 82]    HPI: Amrit Cummings is a 79 y o  male with a past medical history of B-cell lymphoma, hypertension, and stage IV sacral decubitus ulcer who presented to [facility] Emergency Department via EMS 4/12 after being found by police in his bathtub for a wellness check after family had not heard from or seen him in several days  Procedures Performed: None    Summary of Hospital Course: Upon arrival to the ED, he was found to be minimally responsive and required intubation for airway support  He was also hypotensive  A right femoral central line was placed and Levophed was started  He was pan CT scanned showing no acute findings with the exception of a possible left lower pneumonia  He was transferred to [address] [place] [address] for ICU care and further workup  The patient underwent aggressive IV fluid resuscitation and ultimately required the addition of vasopressin and epinephrine drips to support his blood pressure  His renal function continued to worsen with minimal urine output  His sacral wound was evaluated by General surgery and felt to be the likely source of sepsis  Due to the extensive wound, it was recommended that he have debridement in the operating room  The patient had elevated white blood cell count and was hypernatremic  He also had an elevated CK and lactic acid, along with elevated troponins which peaked at 0 7 and then down trended  There were no acute changes on EKG    He was given a stress dose of Solu-Cortef an 80 cortisol level was drawn and 56 6  The patient was noted to have dark drainage from his NG tube and found to be gastroccult positive  He was started on Protonix 40 mg IV b i d  Alise Samayoa A bedside echo was done showing  An ejection fraction estimated to be 45 %  His family came in and goals of care discussion with the critical care team yielded the family requesting to transition to comfort care due to multi-system organ failure and quality of life  Therefore, the patient was given IV pain medication and anxiolysis and was terminally extubated  He  peacefully and comfortably in the presence of his family at 1:38 p m  on 2021         Significant Findings, Care, Treatment and Services Provided: as above    Complications: as above    Condition at Time of Death: Critical    Final Diagnosis: Multisystem organ failure due to sepsis    Date, Time and Cause of Death    Date of Death: 21  Time of Death:  1:38 PM  Preliminary [facility] of Death: [facility]  Entered by: Izzy Baum[KS1 1]     Attribution     KS1 1 Alphonso Stack PA-C 21 15:47          PCP: LINNEA Whittington    Disposition:

## 2021-04-13 NOTE — NURSING NOTE
RN attempted to flush catheter without success  EZEQUIEL arreola  Catheter then removed with difficulty due to clots and kink in tubing by ISAAK Oswald RN  Catheter manipulated to remove kink and catheter subsequently removed with success  Large amount of blood clots noted  Pargi 72 catheter inserted by Tomasa Brittle RN with 225ml of pink urine with clots noted

## 2021-04-13 NOTE — ASSESSMENT & PLAN NOTE
· > 2 0 on initial labs  · Peaked at 3 4 despite fluid resuscitation  · Poor UOP  · Considering CRRT     · Nephrology consulted

## 2021-04-13 NOTE — ASSESSMENT & PLAN NOTE
· Per family member, he was supposed to have a visit with his doctor on 4/12 for treatment  · He missed appointment due to current illness  · Follow upon discharge

## 2021-04-13 NOTE — ASSESSMENT & PLAN NOTE
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- POA  · General Surgery consulted and will need debridement- likely several   · Presented with encephalopathy, RED, lactic acidosis, respiratory distress and hypotension  · Blood cultures, sputum and urine cultures pending  · Empiric treatment with Vancomycin and Cefepime  · MRSA pending  · COVID and Flu negative  · CT chest with possible left lower lobe pneumonia  · Currently requiring Vasopressin, Levophed, and Epi for hemodynamic support    · Received 500 cc of 5% albumin and 1 liter bolus of LR  · Received 2 amps of Bicarb   · Given 100 mg IV Solucortef and continued on 50 mg q 6 hours  · Cortisol level 56 this am  · NPO secondary to vasopressor requirements  · Trend labs closely  · Family GOC discussion with Critical Care team, decision was made to transition to [address] due to extensive multiorgan failure and underlying comorbities

## 2021-04-13 NOTE — ASSESSMENT & PLAN NOTE
· Worsening LFT's likely due to shock  · INR elevated at 1 3  · Cont to monitor and avoid hepatotoxic meds

## 2021-04-13 NOTE — CONSULTS
Patient has completed vancomycin therapy (comfort care)  Thank you for the consult  Pharmacy will now sign off

## 2021-04-13 NOTE — ASSESSMENT & PLAN NOTE
· Workup in progress  · Blood cultures, sputum cultures and urine culture pending  · CT head, neck, abd, pelvis, c-spine negative for acute findings  CT chest with possible left lower lobe pneumonia  · Procalcitonin 5 63 with reflex  · Empiric treatment with Vancomycin and Cefepime  · Neuro checks Q2H

## 2021-04-13 NOTE — ASSESSMENT & PLAN NOTE
· Unknown source at this time  · Presented with encephalopathy, RED, lactic acidosis, respiratory distress and hypotension  · Blood cultures, sputum and urine cultures pending  · Empiric treatment with Vancomycin and Cefepime  · MRSA pending  · COVID and Flu negative  · CT chest with possible left lower lobe pneumonia  · Currently requiring Vasopressin and Levophed for hemodynamic support

## 2021-04-13 NOTE — RESPIRATORY THERAPY NOTE
Pt  Taken to CT-scan on portable vent with nursing asst  Transport unevenful  Pt  Returned to E  R  #5 @ 3135 and placed on the ventilator with the prior settings

## 2021-04-13 NOTE — ASSESSMENT & PLAN NOTE
· 0 42 on initial evaluation  · Repeat 0 52  · Likely secondary to demand ischemia  No ST elevations noted on EKG  · Continue to trend troponin until plateau or decreasing  · Consider heparin gtt if troponin continues to increase  · Consider Cardiology consult

## 2021-04-13 NOTE — ED PROCEDURE NOTE
PROCEDURE  ECG 12 Lead Documentation Only    Date/Time: 4/13/2021 2:04 AM  Performed by: Giulia Peacock MD  Authorized by: Giulia Peacock MD     Indications / Diagnosis:  Intubation, elevated troponin   ECG reviewed by me, the ED Provider: yes    Patient location:  ED  Interpretation:     Interpretation: abnormal    Rate:     ECG rate:  111    ECG rate assessment: tachycardic    Rhythm:     Rhythm: sinus tachycardia    QRS:     QRS axis:  Normal    QRS intervals:  Normal  Conduction:     Conduction: normal    ST segments:     ST segments:  Non-specific  T waves:     T waves: non-specific           Giulia Peacock MD  04/13/21 0205

## 2021-04-13 NOTE — ASSESSMENT & PLAN NOTE
· Hold all BP lowering medications at this time in the setting of hypotension  · Appears to take amlodipine, atenolol, HCTZ and Lotensin as home medications  · Will resume home meds when able

## 2021-04-13 NOTE — ASSESSMENT & PLAN NOTE
· Worsening LFT's likely due to shock  · INR elevated at 1 3  · Cont to monitor and avoid hepatotoxic meds  · Transitioned to [address]

## 2021-04-13 NOTE — CONSULTS
Consult received for dietary recommendations  Per chart review, pt with no recent significant wt changes, appears well nourished  Propofol infusing at 5 3 ml/h providing about 140 kcal daily  RED, possible CRRT/HD, pending nephrology consult  At this time, recommend trophic feeding of Jevity 1 2 at 10 ml/h given septic shock  If medically appropriate to advance, recommend increasing Jevity 1 2 10 ml q 4 h to goal rate of 50 ml/h  Addition of prosource daily  TF + prosource + current propofol rate to provide 1640 kcal, 82 g PRO, 968 ml free water, 203 g CHO  Water flush per MD      Recommend accuchecks given type 2 DM  Also recommend daily weights  Thank you for the consult, will follow up

## 2021-04-13 NOTE — ASSESSMENT & PLAN NOTE
Lab Results   Component Value Date    HGBA1C 5 8 (H) 03/26/2021       Recent Labs     04/13/21  1246   POCGLU 219*       Blood Sugar Average: Last 72 hrs:     · Taken from chart history  · Home medications appear to be metformin and Januvia  No insulin noted in recent family medicine chart review  · Hold home meds  Continue with SSI with Q6H glucose checks     · Transitioned to [address]

## 2021-04-13 NOTE — H&P
[address]  H&P- Ric Craig 1950, 79 y o  male MRN: [id number]  Unit/Bed#: -01 Encounter: [id number]  Primary Care Provider: LINNEA Amaro   Date and time admitted to hospital: 4/13/2021  3:53 AM    * Septic shock [facility])  Assessment & Plan  · Unknown source at this time  · Presented with encephalopathy, RED, lactic acidosis, respiratory distress and hypotension  · Blood cultures, sputum and urine cultures pending  · Empiric treatment with Vancomycin and Cefepime  · MRSA pending  · COVID and Flu negative  · CT chest with possible left lower lobe pneumonia  · Currently requiring Vasopressin and Levophed for hemodynamic support  Encephalopathy  Assessment & Plan  · Unknown cause at this time  Workup in progress  · Blood cultures, sputum cultures and urine culture pending  · CT head, neck, abd, pelvis, c-spine negative for acute findings  CT chest with possible left lower lobe pneumonia  · Procalcitonin pending with reflex  · Empiric treatment with Vancomycin and Cefepime in progress  · Neuro checks Q2H  RED (acute kidney injury) ([facility] [address] )  Assessment & Plan  · > 2 0 on initial labs  · Repeat > 3 despite fluid resuscitation  · Considering HD/CRRT  · Consider Nephrology consult  Hypernatremia  Assessment & Plan  · 160 on initial labs  · May be secondary to dehydration  · Will monitor with daily labs  Glaucoma  Assessment & Plan  · Per family member, he was supposed to have a visit with his doctor on 4/12 for treatment  · He missed appointment due to current illness  · Follow upon discharge  Elevated troponin  Assessment & Plan  · 0 42 on initial evaluation  · Repeat 0 52  · Likely secondary to demand ischemia  No ST elevations noted on EKG  · Continue to trend troponin until plateau or decreasing  · Consider heparin gtt if troponin continues to increase  · Consider Cardiology consult  Hypomagnesemia  Assessment & Plan  · Appears to be chronic  · Follow with daily labs  · Replete as necessary  Small cell b-cell lymphoma, lymph nodes of head, face, and neck (HCC)  Assessment & Plan  · Follow with outpatient hematology/Oncology  · Continue to follow up outpatient  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, with long-term current use of insulin (HCC)  Assessment & Plan  Lab Results   Component Value Date    HGBA1C 5 8 (H) 03/26/2021       No results for input(s): POCGLU in the last 72 hours  Blood Sugar Average: Last 72 hrs:     · Taken from chart history  · Home medications appear to be metformin and Januvia  No insulin noted in recent family medicine chart review  · Hold home meds  Continue with SSI with Q6H glucose checks  Hyperlipidemia  Assessment & Plan  · Taken from review of chart  · Does not appear to be on a statin at this time  Hypertension  Assessment & Plan  · Hold all BP lowering medications at this time in the setting of hypotension  · Appears to take amlodipine, atenolol, HCTZ and Lotensin as home medications  · Will resume home meds when able         -------------------------------------------------------------------------------------------------------------  Chief Complaint: Found unresponsive at home  History of Present Illness   HX and PE limited by: Encephalopathy    Soraya Zavala is a 79 y o  male who presented to [facility] Emergency Department via EMS after being found by police in his bathtub for a wellness check after family had not heard from or seen him in several days  Upon arrival to the ED, he was found to be minimally responsive and required intubation for airway support  He was also hypotensive  A right femoral central line was placed and Levophed was started  He was pan CT scanned  There were no acute findings with the exception of a possible left lower pneumonia    He was transferred to [address] [place] [address] for ICU care and further workup  History obtained from chart review  -------------------------------------------------------------------------------------------------------------  Dispo: Admit to Critical Care     Code Status: Level 1 - Full Code  --------------------------------------------------------------------------------------------------------------  Review of Systems    Review of systems was unable to be performed secondary to encephalopathy  Physical Exam  Constitutional:       General: He is in acute distress  Appearance: He is overweight  He is toxic-appearing  He is not diaphoretic  Interventions: He is sedated, intubated and restrained  HENT:      Head: Normocephalic and atraumatic  Right Ear: External ear normal       Left Ear: External ear normal       Nose: Nose normal       Mouth/Throat:      Pharynx: Oropharynx is clear  Eyes:      General: No scleral icterus  Right eye: No discharge  Left eye: No discharge  Conjunctiva/sclera: Conjunctivae normal       Comments: Pupils sluggish to light response and round  Neck:      Musculoskeletal: Neck supple  No neck rigidity  Cardiovascular:      Rate and Rhythm: Regular rhythm  Tachycardia present  Heart sounds: No friction rub  No gallop  Comments: Pulses diminished in the bilaterally radial areas  Pulmonary:      Effort: He is intubated  Breath sounds: Rhonchi present  No wheezing or rales  Comments: ETT appears on proper position  Tolerating current ventilator settings  Abdominal:      General: Bowel sounds are normal  There is no distension  Palpations: Abdomen is soft  There is no mass  Comments: Protuberant  Musculoskeletal:         General: No tenderness, deformity or signs of injury  Right lower leg: No edema  Left lower leg: No edema  Skin:     General: Skin is dry        Capillary Refill: Capillary refill takes more than 3 seconds  Coloration: Skin is not jaundiced or pale  Findings: No rash  Comments: Trunk warm to the touch  Extremities cold to the touch  Large, unstagable sacral wound noted with a foul smell  Neurological:      Comments: Sedated  --------------------------------------------------------------------------------------------------------------  Vitals:   Vitals:    04/13/21 0550 04/13/21 0600 04/13/21 0611 04/13/21 0626   BP: 100/54 (!) 78/51  95/57   Pulse: (!) 140 (!) 138 (!) 138 (!) 137   Resp: 22 20 19 (!) 24   Temp: 99 1 °F (37 3 °C) 99 3 °F (37 4 °C) 99 5 °F (37 5 °C) 99 7 °F (37 6 °C)   SpO2: 100% 95% (!) 78% 98%   Weight:         Temp  Min: 93 4 °F (34 1 °C)  Max: 99 7 °F (37 6 °C)  IBW: -88 kg     Body mass index is 29 66 kg/m²      Laboratory and Diagnostics:  Results from last 7 days   Lab Units 04/12/21  2208   WBC Thousand/uL 19 70*   HEMOGLOBIN g/dL 15 4   HEMATOCRIT % 49 5*   PLATELETS Thousands/uL 87*   NEUTROS PCT % 73   MONOS PCT % 10     Results from last 7 days   Lab Units 04/13/21  0542 04/13/21  0218 04/12/21 2208   SODIUM mmol/L 157* 158* 160*   POTASSIUM mmol/L 3 9 3 5 3 0*   CHLORIDE mmol/L 122* 123* 124*   CO2 mmol/L 18* 22 19*   ANION GAP mmol/L 17* 13 17*   BUN mg/dL 99* 94* 79*   CREATININE mg/dL 3 40* 2 69* 2 05*   CALCIUM mg/dL 9 8 9 0 7 7*   GLUCOSE RANDOM mg/dL 203* 159* 182*   ALT U/L 82* 58* 46   AST U/L 166* 93* 54*   ALK PHOS U/L 67 48* 35*   ALBUMIN g/dL 1 9* 2 5* 2 4*   TOTAL BILIRUBIN mg/dL 1 50* 1 70* 1 00     Results from last 7 days   Lab Units 04/13/21  0542 04/12/21  2208   MAGNESIUM mg/dL 2 8* 1 7*   PHOSPHORUS mg/dL 3 7  --            Results from last 7 days   Lab Units 04/13/21  0539 04/13/21  0136 04/12/21  2208   TROPONIN I ng/mL 0 76* 0 52* 0 42*     Results from last 7 days   Lab Units 04/13/21  0539 04/13/21  0136   LACTIC ACID mmol/L 3 9* 2 9*     ABG:  Results from last 7 days   Lab Units 04/13/21  0415   PH ART  7 353   PCO2 ART mm Hg 34 2*   PO2 ART mm Hg 74 7*   HCO3 ART mmol/L 18 6*   BASE EXC ART mmol/L -6 0   ABG SOURCE  Radial, Left     VBG:  Results from last 7 days   Lab Units 04/13/21  0415   ABG SOURCE  Radial, Left           Micro:        EKG: Sinus tachycardia with non-specific ST depression  Imaging: I have personally reviewed pertinent reports  and I have personally reviewed pertinent films in PACS      Historical Information   Past Medical History:   Diagnosis Date    Cerebral vascular disease     Diabetes mellitus ([place] [address] )     Glaucoma     Hyperlipidemia     Hypertension     Lymphoma ([facility] )      Past Surgical History:   Procedure Laterality Date    CATARACT EXTRACTION      COLONOSCOPY      GANGLION CYST EXCISION      GLAUCOMA SURGERY Right 2019    SKIN LESION EXCISION       Social History   Social History     Substance and Sexual Activity   Alcohol Use Yes    Alcohol/week: 1 0 standard drinks    Types: 1 Cans of beer per week    Comment: rarely     Social History     Substance and Sexual Activity   Drug Use No     Social History     Tobacco Use   Smoking Status Former Smoker    Types: Cigars   Smokeless Tobacco Never Used     Exercise History: Lives alone  Performs ADLs     Family History:   Family History   Problem Relation Age of Onset    Heart disease Mother     Asthma Mother     Emphysema Mother     Hypertension Mother     Diabetes Father     Lung cancer Maternal Uncle     Breast cancer Cousin      Family history unknown      Medications:  Current Facility-Administered Medications   Medication Dose Route Frequency    cefepime (MAXIPIME) IVPB (premix in dextrose) 1,000 mg 50 mL  1,000 mg Intravenous Q12H    chlorhexidine (PERIDEX) 0 12 % oral rinse 15 mL  15 mL Swish & Spit Q12H [address]    famotidine (PEPCID) oral suspension 20 mg  20 mg Oral Daily    fentaNYL 1000 mcg in sodium chloride 0 9% 100mL infusion  50 mcg/hr Intravenous Continuous    heparin (porcine) subcutaneous injection 5,000 Units  5,000 Units Subcutaneous Q8H [facility]    hydrocortisone sodium succinate (PF) (Solu-CORTEF) injection 100 mg  100 mg Intravenous Once    insulin lispro (HumaLOG) 100 units/mL subcutaneous injection 1-6 Units  1-6 Units Subcutaneous Q6H [facility]    lactated ringers infusion  125 mL/hr Intravenous Continuous    norepinephrine (LEVOPHED) 1 mg/mL injection **ADS Override Pull**        norepinephrine (LEVOPHED) 4 mg (STANDARD CONCENTRATION) IV in sodium chloride 0 9% 250 mL  1-30 mcg/min Intravenous Titrated    phenylephrine (MARTHA-SYNEPHRINE) 50 mg (STANDARD CONCENTRATION) in sodium chloride 0 9% 250 mL   mcg/min Intravenous Titrated    phenylephrine HCl (VAZCULEP) 10 MG/ML solution **ADS Override Pull**        propofol (DIPRIVAN) 1000 mg in 100 mL infusion (premix)  5-50 mcg/kg/min Intravenous Titrated    vancomycin (VANCOCIN) IVPB (premix in dextrose) 1,000 mg 200 mL  15 mg/kg (Adjusted) Intravenous Daily PRN    vasopressin (PITRESSIN) 20 Units in sodium chloride 0 9 % 100 mL infusion  0 04 Units/min Intravenous Continuous     Home medications:  Prior to Admission Medications   Prescriptions Last Dose Informant Patient Reported? Taking?    Blood Pressure Monitor KIT   No No   Sig: Use daily   COMBIGAN 0 2-0 5 %  Self Yes No   Si drop every 12 (twelve) hours 1 drop each eye twice daily   Januvia 50 MG tablet  Self No No   Sig: take 1 tablet by mouth once daily   ONETOUCH DELICA LANCETS FINE MISC  Self No No   Sig: by Does not apply route daily   RHOPRESSA 0 02 % SOLN  Self Yes No   Sig: daily at bedtime 1 Drop each eye at bedtime   Vyzulta 0 024 % SOLN  Self Yes No   Sig: instill 1 drop into both eyes every NIGHT   amLODIPine (NORVASC) 10 mg tablet  Self No No   Sig: take 1 tablet by mouth once daily   aspirin 81 MG tablet  Self Yes No   Sig: Take 81 mg by mouth daily   atenolol (TENORMIN) 50 mg tablet  Self No No   Sig: Take 1 tablet (50 mg total) by mouth daily   atropine (ISOPTO ATROPINE) 1 % ophthalmic solution Self Yes No   Sig: instill 1 drop into right eye twice a day AFTER SURGERY   benazepril (LOTENSIN) 40 MG tablet   No No   Sig: Take 1 tablet (40 mg total) by mouth daily   diclofenac sodium (VOLTAREN) 1 %  Self No No   Sig: Apply 2 g topically 2 (two) times a day as needed (pain)   glucose blood (ONE TOUCH ULTRA TEST) test strip  Self No No   Si each by Other route daily Use as instructed   hydrochlorothiazide (HYDRODIURIL) 25 mg tablet  Self No No   Sig: take 1 tablet by mouth once daily   magnesium Oxide (MAG-OX) 400 mg TABS  Self Yes No   Sig: Take 400 mg by mouth daily   metFORMIN (GLUCOPHAGE) 1000 MG tablet  Self No No   Sig: take 1 tablet by mouth twice a day WITH A MEAL   prednisoLONE acetate (PRED FORTE) 1 % ophthalmic suspension  Self Yes No   Sig: instill 1 drop into right eye EVERY 1-2 HOURS AFTER SURGERY   simvastatin (ZOCOR) 20 mg tablet  Self No No   Sig: take 1 tablet by mouth at bedtime      Facility-Administered Medications: None     Allergies: Allergies   Allergen Reactions    Sulfa Antibiotics Rash, Fever and Tongue Swelling    Allopurinol        ------------------------------------------------------------------------------------------------------------  Advance Directive and Living Will:      Power of :    POLST:    ------------------------------------------------------------------------------------------------------------  Anticipated Length of Stay is > 2 midnights    Care Time Delivered:   Upon my evaluation, this patient had a high probability of imminent or life-threatening deterioration due to encephalopathy requirng endotracheal intubation, mechanical ventilation and hemodynamic instability requiring vasopressor support, which required my direct attention, intervention, and personal management    I have personally provided 80 minutes (0500 to 0620) of critical care time, exclusive of procedures, teaching, family meetings, and any prior time recorded by providers other than myself  [facility], PA-C        Portions of the record may have been created with voice recognition software  Occasional wrong word or "sound a like" substitutions may have occurred due to the inherent limitations of voice recognition software    Read the chart carefully and recognize, using context, where substitutions have occurred

## 2021-04-13 NOTE — ED PROCEDURE NOTE
PROCEDURE  ECG 12 Lead Documentation Only    Date/Time: 4/12/2021 9:56 PM  Performed by: Giulia Benz MD  Authorized by: Giuila Benz MD     Indications / Diagnosis:  Altered mental status  ECG reviewed by me, the ED Provider: yes    Patient location:  ED  Interpretation:     Interpretation: abnormal    Rate:     ECG rate:  103    ECG rate assessment: tachycardic    Rhythm:     Rhythm: sinus tachycardia    Ectopy:     Ectopy: none    QRS:     QRS axis:  Normal    QRS intervals:  Normal  Conduction:     Conduction: normal    ST segments:     ST segments:  Abnormal    Depression:  II, III and aVF  T waves:     T waves: non-specific           Giulia Benz MD  04/12/21 [address] [place] [address] Ann Bang MD  04/12/21 6297

## 2021-04-13 NOTE — ASSESSMENT & PLAN NOTE
Lab Results   Component Value Date    HGBA1C 5 8 (H) 03/26/2021       No results for input(s): POCGLU in the last 72 hours  Blood Sugar Average: Last 72 hrs:     · Taken from chart history  · Home medications appear to be metformin and Januvia  No insulin noted in recent family medicine chart review  · Hold home meds  Continue with SSI with Q6H glucose checks

## 2021-04-13 NOTE — CONSULTS
Consultation - General Surgery   Vj Mishra 79 y o  male MRN: [id number]  Unit/Bed#: -01 Encounter: [id number]    Assessment/Plan     Assessment: This is a 51-year-old male who was found by police in his bathtub  He had been down for an unknown period of time  He is currently being treated for septic shock  A large sacral wound is a possible source  He is also found to be hypotensive on multiple pressors  Plan:  The patient has multiple pressure ulcerations to the back and the shoulder areas  The largest of which is a 7 x 9 cm sacral wound which is not stageable  There is a thick area of skin necrosis overlying the wound  There is minimal bloody drainage  This certainly could be the patient's source of sepsis, therefore I would recommend debridement  The critical care service has had a discussion with the patient's family and they have chosen to make him comfort care  Surgery will sign off at this time, please call with any other questions or concerns  History of Present Illness   HPI:  Vj Mishra is a 79 y o  male who we are asked to see due to a large sacral decubitus wound  The patient was found in his bathtub by police upon a welfare check  He was found to be hypotensive and not responding  He was immediately intubated and brought to the hospital   On current evaluation he is found to be on the ventilator    He is on multiple vasopressors in his urine output is minimal       Review of Systems   Unable to perform ROS: Intubated       Historical Information   Past Medical History:   Diagnosis Date    Cerebral vascular disease     Diabetes mellitus ([facility] [address] )     Glaucoma     Hyperlipidemia     Hypertension     Lymphoma ([facility] [address] )      Past Surgical History:   Procedure Laterality Date    CATARACT EXTRACTION      COLONOSCOPY      GANGLION CYST EXCISION      GLAUCOMA SURGERY Right 2019    SKIN LESION EXCISION       Social History   Social History     Substance and Sexual Activity   Alcohol Use Yes    Alcohol/week: 1 0 standard drinks    Types: 1 Cans of beer per week    Comment: rarely     Social History     Substance and Sexual Activity   Drug Use No     E-Cigarette/Vaping    E-Cigarette Use Never User      E-Cigarette/Vaping Substances     Social History     Tobacco Use   Smoking Status Former Smoker    Types: Cigars   Smokeless Tobacco Never Used     Family History: non-contributory    Meds/Allergies   all current active meds have been reviewed  Allergies   Allergen Reactions    Sulfa Antibiotics Rash, Fever and Tongue Swelling    Allopurinol        Objective   First Vitals:   Blood Pressure: 90/67 (04/13/21 0432)  Pulse: (!) 135 (04/13/21 0432)  Temperature: 99 9 °F (37 7 °C) (04/13/21 0401)  Temp Source: Temporal (04/13/21 0401)  Respirations: (!) 26 (04/13/21 0432)  Height: 5' 7" (170 2 cm) (04/13/21 0739)  Weight - Scale: 85 9 kg (189 lb 6 oz) (04/13/21 0412)  SpO2: (!) 88 % (04/13/21 0432)    Current Vitals:   Blood Pressure: 126/59 (04/13/21 1100)  Pulse: 102 (04/13/21 1100)  Temperature: 99 1 °F (37 3 °C) (04/13/21 1100)  Temp Source: Temporal (04/13/21 1100)  Respirations: 14 (04/13/21 1100)  Height: 5' 7" (170 2 cm) (04/13/21 0739)  Weight - Scale: 85 9 kg (189 lb 6 oz) (04/13/21 0545)  SpO2: 95 % (04/13/21 1100)      Intake/Output Summary (Last 24 hours) at 4/13/2021 1151  Last data filed at 4/13/2021 1020  Gross per 24 hour   Intake 2666 54 ml   Output 225 ml   Net 2441 54 ml       Invasive Devices     Central Venous Catheter Line            CVC Central Lines 04/12/21 less than 1 day          Peripheral Intravenous Line            Peripheral IV 04/12/21 Right;Ventral (anterior) Foot 1 day    Peripheral IV 04/12/21 Right Antecubital less than 1 day          Arterial Line            Arterial Line 04/13/21 Radial less than 1 day          Drain            Urethral Catheter Coude 16 Fr  less than 1 day          Airway            ETT  7 5 mm less than 1 day Physical Exam  Constitutional:       Appearance: He is ill-appearing and toxic-appearing  HENT:      Head: Normocephalic and atraumatic  Mouth/Throat:      Mouth: Mucous membranes are moist    Cardiovascular:      Rate and Rhythm: Normal rate  Skin:     Comments: There is a large 7 x 9 cm sacral decubitus wound with a thick layer of skin necrosis, there is slight bloody discharge from the inferior portion, there is no surrounding erythema, there is a foul odor  There are other superficial decubitus wounds of bilateral upper back and bilateral shoulders present  These wounds do not show any sign of infection  Lab Results:   CBC:   Lab Results   Component Value Date    WBC 20 39 (H) 04/13/2021    HGB 14 4 04/13/2021    HCT 47 7 04/13/2021    MCV 95 04/13/2021    PLT 86 (L) 04/13/2021    MCH 28 6 04/13/2021    MCHC 30 2 (L) 04/13/2021    RDW 14 4 04/13/2021    MPV 13 0 (H) 04/13/2021    NRBC 1 04/13/2021   , CMP:   Lab Results   Component Value Date    SODIUM 157 (H) 04/13/2021    K 3 9 04/13/2021     (H) 04/13/2021    CO2 18 (L) 04/13/2021    BUN 99 (H) 04/13/2021    CREATININE 3 40 (H) 04/13/2021    CALCIUM 9 8 04/13/2021     (H) 04/13/2021    ALT 82 (H) 04/13/2021    ALKPHOS 67 04/13/2021    EGFR 17 04/13/2021     Imaging: I have personally reviewed pertinent reports  EKG, Pathology, and Other Studies: I have personally reviewed pertinent reports

## 2021-04-13 NOTE — PROGRESS NOTES
[address]  Interval Progress Note Trice Hartman 1950, 79 y o  male MRN: [id number]  Unit/Bed#: -01 Encounter: [id number]  Primary Care Provider: LINNEA Gamble   Date and time admitted to hospital: 4/13/2021  3:53 AM    * Septic shock ([facility] [address] )  Assessment & Plan  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- POA  · General Surgery consulted and will need debridement- likely several   · Presented with encephalopathy, RED, lactic acidosis, respiratory distress and hypotension  · Blood cultures, sputum and urine cultures pending  · Empiric treatment with Vancomycin and Cefepime  · MRSA pending  · COVID and Flu negative  · CT chest with possible left lower lobe pneumonia  · Currently requiring Vasopressin, Levophed, and Epi for hemodynamic support  · Received 500 cc of 5% albumin and 1 liter bolus of LR  · Received 2 amps of Bicarb   · Given 100 mg IV Solucortef and continued on 50 mg q 6 hours  · Cortisol level 56 this am  · NPO secondary to vasopressor requirements  · Trend labs closely  · Family GOC ongoing- likely going to [address] given extensive multiorgan failure and underlying comorbities    Encephalopathy  Assessment & Plan  · Workup in progress  · Blood cultures, sputum cultures and urine culture pending  · CT head, neck, abd, pelvis, c-spine negative for acute findings  CT chest with possible left lower lobe pneumonia  · Procalcitonin 5 63 with reflex  · Empiric treatment with Vancomycin and Cefepime  · Neuro checks Q2H       Acute respiratory failure (HCC)  Assessment & Plan  · Intubated and sedated on APV-CMV 60%/14/8/550  · Trend ABG's and adjust vent as needed    GIB (gastrointestinal bleeding)  Assessment & Plan  · Gastroccult positive- start on Protonix 40 gm IV BID  · Consider consult to Gastroenterology pending [address] discussion  · Trend H/H    Sacral decubitus ulcer, stage IV ([facility] [address] )  Assessment & Plan  · Seen by Surgery and Plan  · Hold all BP lowering medications at this time in the setting of hypotension  · Appears to take amlodipine, atenolol, HCTZ and Lotensin as home medications  · Will resume home meds when able       ----------------------------------------------------------------------------------------  12 hr events:  Patient persistently hypotensive requiring addition of epinephrine drip with Levophed and vasopressin at max rate  Patient also received 2 amps of bicarb  Initially this morning bedside echo revealed collapsible IVC and 500 cc of 5% albumin and 1 L bolus of LR was given  Formal echo completed at bedside shows poor EF  No urine output present this morning and unable to flush catheter so catheter removed, which revealed the catheter to be kinked inside the distal urethra  We were able to successfully place a coude catheter with return of urine and ability to flush  Initially hematuria present with multiple clots, which cleared with flushing the catheter  Urine output remained minimal despite resuscitation and increasing BUN and creatinine  Gastric content hem positive and patient started on Protonix IV b i d   Multiple pressure injuries noted on bilateral feet and shoulders  The deepest is a stage IV of the sacrum which is foul smelling and necrotic  General surgery has evaluated and is recommending debridement should the family wish to pursue invasive measures  Family discussions are continuing with Dr Rosalina Gibson given multiple comorbidities      Disposition: Continue Critical Care   Code Status: Level 2 - DNAR: but accepts endotracheal intubation  ---------------------------------------------------------------------------------------  SUBJECTIVE    Review of Systems  Review of systems was unable to be performed secondary to intubation and sedation  ---------------------------------------------------------------------------------------  OBJECTIVE    Vitals   Vitals:    04/13/21 1043 04/13/21 1100 21 1200 21 1300   BP: 134/63 126/59 111/55 113/59   Pulse: 102 102 100 102   Resp: 14 14 14 14   Temp:  99 1 °F (37 3 °C)     TempSrc:  Temporal     SpO2: 95% 95% 94% 97%   Weight:       Height:         Temp (24hrs), Av °F (36 7 °C), Min:78 4 °F (25 8 °C), Max:100 °F (37 8 °C)  Current: Temperature: 99 1 °F (37 3 °C)  Arterial Line BP: 91/45  Arterial Line MAP (mmHg): 58 mmHg    Respiratory:  SpO2: SpO2: 97 %       Invasive/non-invasive ventilation settings   Respiratory    Lab Data (Last 4 hours)       1141            pH, Arterial       7 305           pCO2, Arterial       35 6           pO2, Arterial       82 7           HCO3, Arterial       17 3           Base Excess, Arterial       -8 2                O2/Vent Data     None                Physical Exam  Vitals signs and nursing note reviewed  Constitutional:       Appearance: He is toxic-appearing  Comments: Intubated and sedated   HENT:      Head: Normocephalic  Eyes:      Conjunctiva/sclera: Conjunctivae normal       Pupils: Pupils are equal, round, and reactive to light  Cardiovascular:      Rate and Rhythm: Tachycardia present  Heart sounds: Normal heart sounds  Pulmonary:      Breath sounds: Normal breath sounds  Abdominal:      General: Bowel sounds are decreased  There is no distension  Palpations: Abdomen is soft  Musculoskeletal:      Right lower leg: Edema present  Left lower leg: Edema present     Skin:               Laboratory and Diagnostics:  Results from last 7 days   Lab Units 21  1255 21  1139 21  0542 21  2208   WBC Thousand/uL  --   62* 20 39* 19 70*   HEMOGLOBIN g/dL  --  11 8* 14 4 15 4   HEMATOCRIT %  --  39 1 47 7 49 5*   PLATELETS Thousands/uL 67* 59* 86* 87*   NEUTROS PCT %  --   --   --  73   BANDS PCT %  --   --  4  --    MONOS PCT %  --   --   --  10   MONO PCT %  --   --  3*  --      Results from last 7 days   Lab Units 21  1138 21  0542 04/13/21  0218 04/12/21  2208   SODIUM mmol/L 155* 157* 158* 160*   POTASSIUM mmol/L 4 1 3 9 3 5 3 0*   CHLORIDE mmol/L 121* 122* 123* 124*   CO2 mmol/L 21 18* 22 19*   ANION GAP mmol/L 13 17* 13 17*   BUN mg/dL 99* 99* 94* 79*   CREATININE mg/dL 3 27* 3 40* 2 69* 2 05*   CALCIUM mg/dL 9 4 9 8 9 0 7 7*   GLUCOSE RANDOM mg/dL 226* 203* 159* 182*   ALT U/L 110* 82* 58* 46   AST U/L 190* 166* 93* 54*   ALK PHOS U/L 55 67 48* 35*   ALBUMIN g/dL 2 2* 1 9* 2 5* 2 4*   TOTAL BILIRUBIN mg/dL 2 08* 1 50* 1 70* 1 00     Results from last 7 days   Lab Units 04/13/21  1138 04/13/21  0542 04/12/21  2208   MAGNESIUM mg/dL 2 3 2 8* 1 7*   PHOSPHORUS mg/dL 3 6 3 7  --       Results from last 7 days   Lab Units 04/13/21  1255   INR  1 37*   PTT seconds 28      Results from last 7 days   Lab Units 04/13/21  1138 04/13/21  0841 04/13/21  0539 04/13/21  0136 04/12/21  2208   TROPONIN I ng/mL 0 59* 0 59* 0 76* 0 52* 0 42*     Results from last 7 days   Lab Units 04/13/21  1139 04/13/21  0841 04/13/21  0539 04/13/21  0136   LACTIC ACID mmol/L 3 0* 2 9* 3 9* 2 9*     ABG:  Results from last 7 days   Lab Units 04/13/21  1141   PH ART  7 305*   PCO2 ART mm Hg 35 6*   PO2 ART mm Hg 82 7   HCO3 ART mmol/L 17 3*   BASE EXC ART mmol/L -8 2   ABG SOURCE  Line, Arterial     VBG:  Results from last 7 days   Lab Units 04/13/21  1141   ABG SOURCE  Line, Arterial     Results from last 7 days   Lab Units 04/13/21  0542   PROCALCITONIN ng/ml 5 63*       Micro        EKG:   Imaging: I have personally reviewed pertinent reports        Intake and Output  I/O       04/11 0701 - 04/12 0700 04/12 0701 - 04/13 0700 04/13 0701 - 04/14 0700    I V  (mL/kg)  224 3 (2 6) 1500 6 (17 5)    IV Piggyback  50 1050    Total Intake(mL/kg)  274 3 (3 2) 2550 6 (29 7)    Urine (mL/kg/hr)  0 365 (0 6)    Total Output  0 365    Net  +274 3 +2185 6                 Height and Weights   Height: 5' 7" (170 2 cm)  IBW (Ideal Body Weight): 66 1 kg  Body mass index is 29 66 kg/m²  Weight (last 2 days)     Date/Time   Weight    04/13/21 0545   85 9 (189 38)    04/13/21 0412   85 9 (189 38)                Nutrition       Diet Orders   (From admission, onward)             Start     Ordered    04/13/21 0400  Diet NPO  Diet effective now     Question Answer Comment   Diet Type NPO    RD to adjust diet per protocol?  No        04/13/21 0411                  Active Medications  Scheduled Meds:  Current Facility-Administered Medications   Medication Dose Route Frequency Provider Last Rate    cefepime  1,000 mg Intravenous Q12H Elva Begun SARAH Wilson JrC 1,000 mg (04/13/21 0521)    chlorhexidine  15 mL Swish & Spit Q12H 1131 No  [place] , PA-C      epinephrine  1-10 mcg/min Intravenous Titrated SARAH RogersC 2 mcg/min (04/13/21 0930)    famotidine  20 mg Oral Daily Claudia Daniels PA-C      fentaNYL  50 mcg/hr Intravenous Continuous Elva Begun SIOBHAN Wilson Jr-C 50 mcg/hr (04/13/21 0441)    heparin (porcine)  5,000 Units Subcutaneous Q8H 1131 No  [place] , PA-C      hydrocortisone sodium succinate  50 mg Intravenous Q6H [facility] Marlyn Iniguez MD      insulin lispro  1-6 Units Subcutaneous Q6H [facility] Elva Begun Katie Morales PA-C      lactated ringers  125 mL/hr Intravenous Continuous Elva Begun SARAH Wilson JrC 125 mL/hr (04/13/21 0741)    norepinephrine  1-30 mcg/min Intravenous Titrated Germaine Baum PA-C      pantoprazole  40 mg Intravenous Q12H [facility] Germaine Baum, [place]      phenylephrine HCl           propofol  5-50 mcg/kg/min Intravenous Titrated Elva Begun SARAH Wilson JrC 10 mcg/kg/min (04/13/21 0550)    [START ON 4/14/2021] vancomycin  15 mg/kg (Adjusted) Intravenous Daily PRN Elva Begun SIOBHAN Wilson Jr-ISAAK      vasopressin  0 04 Units/min Intravenous Continuous Elva Begun SIOBHAN Wilson Jr-C 0 04 Units/min (04/13/21 1128)     Continuous Infusions:  epinephrine, 1-10 mcg/min, Last Rate: 2 mcg/min (04/13/21 5604)  fentaNYL, 50 mcg/hr, Last Rate: 50 mcg/hr (04/13/21 0441)  lactated ringers, 125 mL/hr, Last Rate: 125 mL/hr (04/13/21 0763)  norepinephrine, 1-30 mcg/min  propofol, 5-50 mcg/kg/min, Last Rate: 10 mcg/kg/min (04/13/21 0503)  vasopressin, 0 04 Units/min, Last Rate: 0 04 Units/min (04/13/21 1128)      PRN Meds:   [START ON 4/14/2021] vancomycin, 15 mg/kg (Adjusted), Daily PRN        Invasive Devices Review  Invasive Devices     Central Venous Catheter Line            CVC Central Lines 04/12/21 less than 1 day          Peripheral Intravenous Line            Peripheral IV 04/12/21 Right;Ventral (anterior) Foot 1 day    Peripheral IV 04/12/21 Right Antecubital less than 1 day          Arterial Line            Arterial Line 04/13/21 Radial less than 1 day          Drain            Urethral Catheter Coude 16 Fr  less than 1 day          Airway            ETT  7 5 mm less than 1 day                Rationale for remaining devices: hemodynamically unstable  ---------------------------------------------------------------------------------------  Advance Directive and Living Will:      Power of :    POLST:    ---------------------------------------------------------------------------------------  Care Time Delivered:   Upon my evaluation, this patient had a high probability of imminent or life-threatening deterioration due to shock and respiratory failure, which required my direct attention, intervention, and personal management  I have personally provided 120 minutes ([phone]) of critical care time, exclusive of procedures, teaching, family meetings, and any prior time recorded by providers other than myself  Dois Running, PA-C      Portions of the record may have been created with voice recognition software  Occasional wrong word or "sound a like" substitutions may have occurred due to the inherent limitations of voice recognition software    Read the chart carefully and recognize, using context, where substitutions have occurred

## 2021-04-13 NOTE — PROGRESS NOTES
Chart reviewed, CM consulted for dc planning  Aware patient was a transfer from [facility] for ICU Care  Admit with septic shock     Scheduled Meds: IV_Cefepime, Vanco  Infusions:  Epinephrine  norepiphrine  Fentanyl   Propofol  Vasopressin    I called and spoke to Rod Mason to obtain baseline information on [facility]  Tracey Roberto is a 1st cousin to [place] [id number]  Tracey Roberto is unsure if patient has a POA/ Advance Directive  Tracey Roberto is going to look at [facility] today  Pt never had children, no siblings  Tracey Roberto is willing to assist with decision makers  Baseline information was obtained    CM discussed the role of CM        04/13/21 9306   Referral Data   Referral Reason   (DC planning)   [facility]   Patient Information   Mental Status Intubated   Primary Caregiver Self   Support System Extended family   Activities of Daily Living Prior to Admission   Functional Status Independent   Assistive Device No device needed   Living [facility] of Transport to Appts: Public Transportation - Bus  (family transport)       PCP:           [place] Provider  Pt has a prescription plan and uses  Pietro Ribeiro  Patient is Not  a [place]:  Elvis HEATH, per rTacey Roberto patient is an anxious person  House set up: lives alone in a house that is rented from [address] son  1st floor set up for patient, noting laundry is done in the basement  Functional level PTA: I/PTA  DME use: none  Prior use of Home Care or STR: None  Transportation: Does Not drive-- Having vision issues_lossing vision in both eyes  Friends transport or uses [address]: none    CM will continue to follow medical management and assist with dc needs

## 2021-04-13 NOTE — DEATH NOTE
INPATIENT DEATH NOTE  Pina Mahan 79 y o  male MRN: [id number]  Unit/Bed#: -01 Encounter: [id number]    Date, Time and Cause of Death    Date of Death: 21  Time of Death:  1:38 PM  Preliminary Cause of Death: Septic shock ([facility] [address] )  Entered by: Geraldo Baum[KS1 1]     Attribution     KS1 1 Naomi Cortes PA-C 21 15:47           Patient's Information  Pronounced by: Rangel Beach  Did the patient's death occur in the ED?: No  Did the patient's death occur in the OR?: No  Did the patient's death occur less than 10 days post-op?: No  Did the patient's death occur within 24 hours of admission?: Yes  Was code status DNR at the time of death?: Yes    PHYSICAL EXAM:  Unresponsive to noxious stimuli, Spontaneous respirations absent, Breath sounds absent, Carotid pulse absent, Heart sounds absent, Pupillary light reflex absent and Corneal blink reflex absent    Medical Examiner notification criteria:  Patient  within 24 hours of arrival to hospital   Medical Examiner's office notified?:  Yes   Medical Examiner accepted case?:  Yes  Name of Medical Examiner: Anil Bob    Family Notification  Was the family notified?: Yes  Date Notified: 21  Time Notified: 8215  Notified by: Rangel Beach  Name of Family Notified of Death: Lokesh Collado   Relationship to Patient: Sister  Family Notification Route:  At bedside  Was the family told to contact a  home?: Yes  Name of  Home[de-identified] [place] [id number] of [facility] WITH [facility]    Autopsy Options:  Awaiting decision by 's office    Primary Service Attending Physician notified?:  yes - Attending:  Zackary White MD    Physician/Resident responsible for completing Discharge Summary:  Rangel Beach

## 2021-04-13 NOTE — ASSESSMENT & PLAN NOTE
· Gastroccult positive- start on Protonix 40 gm IV BID  · Consider consult to Gastroenterology pending [address] discussion  · Trend H/H

## 2021-04-13 NOTE — ASSESSMENT & PLAN NOTE
· Intubated and sedated on APV-CMV 60%/14/8/550  · Trend ABG's and adjust vent as needed  · Terminally extubated

## 2021-04-13 NOTE — ED PROVIDER NOTES
History  Chief Complaint   Patient presents with    Trauma     unrespomsive at home       70-year-old male    [address] by EMS for altered mental status    Was found down in bathtub on his left side    Welfare check by police was requested by family who had not heard or seen in for several days    No history per patient a obtainable due to altered mental status          History provided by:  EMS personnel  Altered Mental Status  Presenting symptoms: disorientation and lethargy    Severity:  Severe  Most recent episode:  More than 2 days ago  Associated symptoms comment:  Unable to assess         Prior to Admission Medications   Prescriptions Last Dose Informant Patient Reported? Taking? Blood Pressure Monitor KIT   No No   Sig: Use daily   ONETOUCH DELICA LANCETS FINE MISC  Self No No   Sig: by Does not apply route daily      Facility-Administered Medications: None       Past Medical History:   Diagnosis Date    Cerebral vascular disease     Diabetes mellitus ([facility] [address] )     Glaucoma     Hyperlipidemia     Hypertension     Lymphoma ([facility] )        Past Surgical History:   Procedure Laterality Date    CATARACT EXTRACTION      COLONOSCOPY      GANGLION CYST EXCISION      GLAUCOMA SURGERY Right 2019    SKIN LESION EXCISION         Family History   Problem Relation Age of Onset    Heart disease Mother     Asthma Mother     Emphysema Mother     Hypertension Mother     Diabetes Father     Lung cancer Maternal Uncle     Breast cancer Cousin      I have reviewed and agree with the history as documented  E-Cigarette/Vaping    E-Cigarette Use Never User      E-Cigarette/Vaping Substances    Unknown Yes      Social History     Tobacco Use    Smoking status: Former Smoker     Types: Cigars    Smokeless tobacco: Never Used   Substance Use Topics    Alcohol use:  Yes     Alcohol/week: 1 0 standard drinks     Types: 1 Cans of beer per week     Comment: rarely    Drug use: No       Review of Systems   Unable to perform ROS: Mental status change   Neurological:        Altered mental status       Primary Survey:   (A) Airway: intact  (B) Breathing: +breath sounds bilaterally  (C) Circulation: Pulses:   normal  (D) Disabliity:  GCS Total:  15  (E) Expose:  Completed      Trauma Secondary exam performed (must be performed and documented on all traumas): GCS 15, full ROM of bilateral upper and lower extremities  Airway intact, bilateral breath sounds, palpable pulses  No active bleeding  No bony point tenderness in extremities, chest, abdomen or c/t,l spine  No crepitus, abdomen soft/non tender  Chest wall soft non tender with no deformities  Pelvis stable  Fast or pelvic xray prior to ordering a CT a/p? Yes   (these don't need to be done if not ordering a CT a/p)  Stat portable CXR prior to going to CT chest? Yes (this doesn't need to be done if not ordering CT chest)  Document C-spine clearance after CT c-spine came back normal - cspineclearsmartlist: Of note, C-spine clear after CT scans resulted  Physical Exam  Physical Exam  Constitutional:       Appearance: He is ill-appearing  Comments: Ill appearing   HENT:      Head:      Comments: Contusion versus pressure ulcer of the left forehead     Right Ear: Tympanic membrane, ear canal and external ear normal       Left Ear: Tympanic membrane, ear canal and external ear normal       Nose:      Comments: No signs of trauma  Eyes:      General: No scleral icterus  Right eye: No discharge  Left eye: No discharge  Conjunctiva/sclera: Conjunctivae normal    Neck:      Musculoskeletal: No neck rigidity  Vascular: No JVD  Trachea: No tracheal deviation  Comments: No signs of trauma  Cardiovascular:      Rate and Rhythm: Normal rate  Pulses: Normal pulses  Comments: No pulses w/o CPR  Pulmonary:      Breath sounds: No stridor  No rhonchi        Comments: Decreased respiratory effort  Abdominal:      General: There is no distension  Palpations: There is no mass  Tenderness: There is no abdominal tenderness  There is no right CVA tenderness, left CVA tenderness, guarding or rebound  Hernia: No hernia is present  Comments: Pressure ulcers to the left forehead, left shoulder, left abdomen   Musculoskeletal:         General: No swelling, tenderness or deformity  Right lower leg: No edema  Left lower leg: No edema  Comments: Contusion versus pressure ulcer to the left shoulder   Skin:     General: Skin is warm  Capillary Refill: Capillary refill takes less than 2 seconds  Coloration: Skin is pale  Findings: No erythema or rash  Comments: No trauma   Neurological:      Cranial Nerves: No cranial nerve deficit  Motor: Weakness present        Comments: Nonfocal neurologic exam  Encephalopathic, altered   Psychiatric:      Comments: Unresponsive         Vital Signs  ED Triage Vitals   Temperature Pulse Respirations Blood Pressure SpO2   04/12/21 2132 04/12/21 2145 04/12/21 2132 04/12/21 2132 04/12/21 2132   (!) 96 7 °F (35 9 °C) (!) 120 (!) 58 114/74 95 %      Temp Source Heart Rate Source Patient Position - Orthostatic VS BP Location FiO2 (%)   04/12/21 2132 04/12/21 2354 04/12/21 2132 04/12/21 2132 --   Temporal Monitor Lying Right arm       Pain Score       --                  Vitals:    04/13/21 0155 04/13/21 0200 04/13/21 0215 04/13/21 0230   BP: 116/62 125/67 127/60 132/63   Pulse: (!) 108 (!) 108 105 (!) 110   Patient Position - Orthostatic VS:             Visual Acuity  Visual Acuity      Most Recent Value   L Pupil Size (mm)  3   R Pupil Size (mm)  3          ED Medications  Medications   sodium chloride 0 9 % bolus 1,000 mL (0 mL Intravenous Stopped 4/13/21 0004)   sodium chloride 0 9 % bolus 1,000 mL (0 mL Intravenous Stopped 4/13/21 0114)   piperacillin-tazobactam (ZOSYN) 3 375 g in sodium chloride 0 9 % 100 mL IVPB (0 g Intravenous Stopped 4/13/21 0054)   vancomycin (VANCOCIN) 1,250 mg in sodium chloride 0 9 % 250 mL IVPB (0 mg/kg × 88 7 kg Intravenous Stopped 4/13/21 0210)   potassium chloride 20 mEq IVPB (premix) (20 mEq Intravenous New Bag 4/13/21 0034)   iohexol (OMNIPAQUE) 350 MG/ML injection (MULTI-DOSE) 100 mL (100 mL Intravenous Given 4/12/21 2303)   sodium chloride 0 9 % bolus 1,000 mL (1,000 mL Intravenous New Bag 4/13/21 0146)       Diagnostic Studies  Results Reviewed     Procedure Component Value Units Date/Time    Comprehensive metabolic panel [[id number]]  (Abnormal) Collected: 04/13/21 0218    Lab Status: Final result Specimen: Blood from Arm, Right Updated: 04/13/21 0252     Sodium 158 mmol/L      Potassium 3 5 mmol/L      Chloride 123 mmol/L      CO2 22 mmol/L      ANION GAP 13 mmol/L      BUN 94 mg/dL      Creatinine 2 69 mg/dL      Glucose 159 mg/dL      Calcium 9 0 mg/dL      Corrected Calcium 10 2 mg/dL      AST 93 U/L      ALT 58 U/L      Alkaline Phosphatase 48 U/L      Total Protein 4 8 g/dL      Albumin 2 5 g/dL      Total Bilirubin 1 70 mg/dL      eGFR 23 ml/min/1 73sq m     Narrative:      [place] guidelines for Chronic Kidney Disease (CKD):     Stage 1 with normal or high GFR (GFR > 90 mL/min/1 73 square meters)    Stage 2 Mild CKD (GFR = 60-89 mL/min/1 73 square meters)    Stage 3A Moderate CKD (GFR = 45-59 mL/min/1 73 square meters)    Stage 3B Moderate CKD (GFR = 30-44 mL/min/1 73 square meters)    Stage 4 Severe CKD (GFR = 15-29 mL/min/1 73 square meters)    Stage 5 End Stage CKD (GFR <15 mL/min/1 73 square meters)  Note: GFR calculation is accurate only with a steady state creatinine    Troponin I [[id number]]  (Abnormal) Collected: 04/13/21 0136    Lab Status: Final result Specimen: Blood from Arm, Right Updated: 04/13/21 0202     Troponin I 0 52 ng/mL     Lactic acid [[id number]]  (Abnormal) Collected: 04/13/21 0136    Lab Status: Final result Specimen: Blood from Arm, Right Updated: 04/13/21 0202     LACTIC ACID 2 9 mmol/L     Narrative:      Result may be elevated if tourniquet was used during collection  Result may be elevated if tourniquet was used during collection  Beta Hydroxybutyrate [[id number]]  (Abnormal) Collected: 04/12/21 2337    Lab Status: Final result Specimen: Blood from Central Venous Line Updated: 04/12/21 2352     BETA-HYDROXYBUTYRATE 0 6 mmol/L     CKMB [[id number]]  (Abnormal) Collected: 04/12/21 2208    Lab Status: Final result Specimen: Blood from Arm, Right Updated: 04/12/21 2336     CK-MB Index 1 2 %      CK-MB 35 7 ng/mL     Blood gas, arterial [[id number]]  (Abnormal) Collected: 04/12/21 2320    Lab Status: Final result Specimen: Blood from Radial, Left Updated: 04/12/21 2331     pH, Arterial 7 440     pCO2, Arterial 31 9 mm Hg      pO2, Arterial 196 0 mm Hg      HCO3, Arterial 21 5 mmol/L      Base Excess, Arterial -1 3 mmol/L      O2 Content, Arterial 20 3 mL/dL      O2 HGB,Arterial  98 5 %      SOURCE Radial, Left     CURTIS TEST No     AC Rate 18     Tidal Volume 500 ml      Inspired Air (FIO2) 100     PEEP 6    CK Total with Reflex CKMB [[id number]]  (Abnormal) Collected: 04/12/21 2208    Lab Status: Final result Specimen: Blood from Arm, Right Updated: 04/12/21 2314     Total CK 2,986 U/L     B-Type Natriuretic Peptide [facility] ONLY) [[id number]]  (Normal) Collected: 04/12/21 2208    Lab Status: Final result Specimen: Blood from Arm, Right Updated: 04/12/21 2307     BNP 80 pg/mL     TSH [[id number]]  (Normal) Collected: 04/12/21 2208    Lab Status: Final result Specimen: Blood from Arm, Right Updated: 04/12/21 2301     TSH 3RD GENERATON 0 710 uIU/mL     Narrative:      Patients undergoing fluorescein dye angiography may retain small amounts of fluorescein in the body for 48-72 hours post procedure  Samples containing fluorescein can produce falsely depressed TSH values  If the patient had this procedure,a specimen should be resubmitted post fluorescein clearance        COVID19, Influenza A/B, RSV PCR, [facility] [[id number]]  (Normal) Collected: 04/12/21 2213    Lab Status: Final result Specimen: Nares from Nasopharyngeal Swab Updated: 04/12/21 2301     SARS-CoV-2 Negative     INFLUENZA A PCR Negative     INFLUENZA B PCR Negative     RSV PCR Negative    Narrative: This test has been authorized by FDA under an EUA (Emergency Use Assay) for use by authorized laboratories  Clinical caution and judgement should be used with the interpretation of these results with consideration of the clinical impression and other laboratory testing  Testing reported as "Positive" or "Negative" has been proven to be accurate according to standard laboratory validation requirements  All testing is performed with control materials showing appropriate reactivity at standard intervals      Comprehensive metabolic panel [[id number]]  (Abnormal) Collected: 04/12/21 2208    Lab Status: Final result Specimen: Blood from Arm, Right Updated: 04/12/21 2256     Sodium 160 mmol/L      Potassium 3 0 mmol/L      Chloride 124 mmol/L      CO2 19 mmol/L      ANION GAP 17 mmol/L      BUN 79 mg/dL      Creatinine 2 05 mg/dL      Glucose 182 mg/dL      Calcium 7 7 mg/dL      Corrected Calcium 9 0 mg/dL      AST 54 U/L      ALT 46 U/L      Alkaline Phosphatase 35 U/L      Total Protein 4 6 g/dL      Albumin 2 4 g/dL      Total Bilirubin 1 00 mg/dL      eGFR 32 ml/min/1 73sq m     Narrative:      [place] guidelines for Chronic Kidney Disease (CKD):     Stage 1 with normal or high GFR (GFR > 90 mL/min/1 73 square meters)    Stage 2 Mild CKD (GFR = 60-89 mL/min/1 73 square meters)    Stage 3A Moderate CKD (GFR = 45-59 mL/min/1 73 square meters)    Stage 3B Moderate CKD (GFR = 30-44 mL/min/1 73 square meters)    Stage 4 Severe CKD (GFR = 15-29 mL/min/1 73 square meters)    Stage 5 End Stage CKD (GFR <15 mL/min/1 73 square meters)  Note: GFR calculation is accurate only with a steady state creatinine Magnesium [[id number]]  (Abnormal) Collected: 04/12/21 2208    Lab Status: Final result Specimen: Blood from Arm, Right Updated: 04/12/21 2256     Magnesium 1 7 mg/dL     Troponin I [[id number]]  (Abnormal) Collected: 04/12/21 2208    Lab Status: Final result Specimen: Blood from Arm, Right Updated: 04/12/21 2256     Troponin I 0 42 ng/mL     Lipase [[id number]]  (Normal) Collected: 04/12/21 2208    Lab Status: Final result Specimen: Blood from Arm, Right Updated: 04/12/21 2255     Lipase 63 u/L     Ethanol [[id number]]  (Normal) Collected: 04/12/21 2208    Lab Status: Final result Specimen: Blood from Arm, Right Updated: 04/12/21 2249     Ethanol Lvl <44 mg/dL     Salicylate level [[id number]]  (Abnormal) Collected: 04/12/21 2208    Lab Status: Final result Specimen: Blood from Arm, Right Updated: 44/49/42 8862     Salicylate Lvl <5 mg/dL     Acetaminophen level-If concentration is detectable, please discuss with medical  on call   [[id number]]  (Abnormal) Collected: 04/12/21 2208    Lab Status: Final result Specimen: Blood from Arm, Right Updated: 04/12/21 2249     Acetaminophen Level <10 ug/mL     Ammonia [[id number]]  (Normal) Collected: 04/12/21 2208    Lab Status: Final result Specimen: Blood from Arm, Right Updated: 04/12/21 2245     Ammonia 50 umol/L     Smear Review(Phlebs Do Not Order) [[id number]]  (Abnormal) Collected: 04/12/21 2208    Lab Status: Final result Specimen: Blood from Arm, Right Updated: 04/12/21 2228     RBC Morphology Normal     Platelet Estimate Decreased    CBC and differential [[id number]]  (Abnormal) Collected: 04/12/21 2208    Lab Status: Final result Specimen: Blood from Arm, Right Updated: 04/12/21 2227     WBC 19 70 Thousand/uL      RBC 5 31 Million/uL      Hemoglobin 15 4 g/dL      Hematocrit 49 5 %      MCV 93 fL      MCH 29 0 pg      MCHC 31 1 g/dL      RDW 15 2 %      MPV 11 5 fL      Platelets 87 Thousands/uL      Neutrophils Relative 73 %      Lymphocytes Relative 18 % Monocytes Relative 10 %      Eosinophils Relative 0 %      Basophils Relative 0 %      Neutrophils Absolute 14 30 Thousands/µL      Lymphocytes Absolute 3 40 Thousands/µL      Monocytes Absolute 1 90 Thousand/µL      Eosinophils Absolute 0 00 Thousand/µL      Basophils Absolute 0 00 Thousands/µL                  TRAUMA - CT chest abdomen pelvis w contrast   Final Result by Skip Akhtar MD (04/13 0003)      1  No acute traumatic injury identified within the chest, abdomen, and pelvis  2   Opacity in the left lower lobe may be due to pneumonia versus aspiration  3   Endotracheal tube terminates above the nahomi  Enteric tube terminates within the stomach  4   Diverticulosis without evidence of diverticulitis  5   The urinary bladder is thickened which may be due to underdistention, chronic bladder outlet obstruction due to enlarged prostate, or cystitis, correlate with urinalysis  6   Hepatic steatosis  Workstation performed: [id number]         TRAUMA - CT spine cervical wo contrast   Final Result by Skip Akhtar MD (04/13 0004)      No cervical spine fracture or traumatic malalignment  Workstation performed: [id number]         TRAUMA - CT facial bones wo contrast   Final Result by Skip Akhtar MD (04/13 0004)      No acute fracture of the maxillofacial structures  Workstation performed: [id number]         TRAUMA - CT head wo contrast   Final Result by Skip Akhtar MD (04/13 0005)      No intracranial hemorrhage or calvarial fracture  Workstation performed: [id number]         XR Trauma pelvis ap only 1 or 2 vw   ED Interpretation by Neto Mason MD (04/12 2251)   No fractures  No traumatic findings      Final Result by Martin Hdz MD (04/13 1138)      No acute osseous abnormality  Workstation performed: [id number]         XR Trauma chest portable   ED Interpretation by Neto Mason MD (04/12 2252)   No fractures  No traumatic findings  No pneumothorax  No signs of pneumonia  ET tube in proper location  OG tube in proper location      Final Result by Jorje Lerner MD (04/13 1145)      1  Endotracheal tube tip 5 cm above the nahomi   2  Airspace disease noted at the left lung base which is better visualized on subsequently performed chest CT  Workstation performed: [id number]                    Procedures  Procedures         ED Course  ED Course as of Apr 14 0458   Mon Apr 12, 2021 2125 Patient seen On arrival   Altered, encephalopathic  Needs intubation    Blood sugars were 260 range  Patient was found down in bathtub on his left side  Pressure ulcers on the head, left shoulder and abdomen  Police went there for welfare check, nobody had seen him in 3 days    Based on findings, trauma alert called      2146 Patient was Intubated promptly and successfully on 1st attempt for airway protection  Patient remained stable    Primary exam for trauma unremarkable  Secondary exam for trauma:  Patient is altered, no deformities, pressure ulcers to the left abdomen, left shoulder  Contusion to the left head      2155 Portable chest x-ray and portable pelvis x-ray on remark  No signs of trauma  ET tube is in proper location as is OG tube   WBC(!): 19 70   2231 Patient was promptly back brought back from CT scan due to blood pressure in the 70s  Levophed was started through peripheral   I placed a right femoral central line on 1st attempt  Pressures are now 130s over 80s    IV fluidS and antibiotics ordered for possibility of sepsis      2236 Discussed with Obdulia Paulino, patient's cousin who helps him with his medical care  He has severe glaucoma and needs help getting around and Gianni Avila is usually the one who helps him with his medical care    We discussed the critical nature of Prem Whaley illness without a exact etiology at this time  She window is very appreciative of patient's care    I will update her when I have more information      2251 ACETAMINOPHEN LEVEL(!): <60   7053 SALICYLATE LEVEL(!): <5   2259 Troponin I(!): 0 42   2259 Lipase: 63   2259 Magnesium(!): 1 7   2259 Labs reviewed  Tox labs unremarkable  Patient with acute renal failure with elevated BUN and hypernatremia, likely pre renal due to dehydration   Comprehensive metabolic panel(!)   3288 Ammonia: 50   2303 COVID19, Influenza A/B, RSV PCR, [facility]   2303 TSH 3RD GENERATON: 0 710   2303 Lipase: 63   2321 Total CK(!): 2,986   2322 BNP: 80   2338 ABG reviewed  Respiratory alkalosis, is mild gap acidosis    Awaiting interpretation of trauma scans  Patient will need transfer as are no ICU beds available here   Blood gas, arterial(!)   2344 CT scans reviewed  No head bleed seen  No overt trauma seen in the CT chest abdomen or pelvis  Radiology read pending    Patient is very clinically stable  Titrating off of Levophed  He still has not needed sedation though      Tue Apr 13, 2021   0009 TRAUMA - CT head wo contrast   0010 INDICATION:   TRAUMA      COMPARISON:  None      TECHNIQUE:  CT examination of the brain was performed  In addition to axial images, sagittal and coronal 2D reformatted images were created and submitted for interpretation      Radiation dose length product (DLP) for this visit:  933 6 mGy-cm   This examination, like all CT scans performed in the [facility], was performed utilizing techniques to minimize radiation dose exposure, including the use of iterative   reconstruction and automated exposure control        IMAGE QUALITY:  Diagnostic      FINDINGS:     PARENCHYMA:  No intracranial mass, mass effect or midline shift  No CT signs of acute infarction    No acute parenchymal hemorrhage      VENTRICLES AND EXTRA-AXIAL SPACES:  Normal for the patient's age      VISUALIZED ORBITS AND PARANASAL SINUSES:  See CT of the face for further evaluation      CALVARIUM AND EXTRACRANIAL SOFT TISSUES:  Mild left occipital parietal soft tissue scalp swelling      IMPRESSION:     No intracranial hemorrhage or calvarial fracture  0010 CT CERVICAL SPINE - WITHOUT CONTRAST     INDICATION:   TRAUMA      COMPARISON:  None      TECHNIQUE:  CT examination of the cervical spine was performed without intravenous contrast   Contiguous axial images were obtained  Sagittal and coronal reconstructions were performed        Radiation dose length product (DLP) for this visit:  359 mGy-cm   This examination, like all CT scans performed in the [facility], was performed utilizing techniques to minimize radiation dose exposure, including the use of iterative   reconstruction and automated exposure control        IMAGE QUALITY:  Diagnostic      FINDINGS:     ALIGNMENT:  Normal alignment of the cervical spine  No subluxation      VERTEBRAL BODIES:  No fracture      DEGENERATIVE CHANGES:  Mild multilevel cervical degenerative changes are noted without critical central canal stenosis      PREVERTEBRAL AND PARASPINAL SOFT TISSUES:  Partially visualized life-support tubing      THORACIC INLET:  Please refer to the concurrent chest, abdomen, and pelvic CT report for description of the thoracic inlet findings      IMPRESSION:     No cervical spine fracture or traumatic malalignment  0010 CT FACIAL BONES WITHOUT INTRAVENOUS CONTRAST     INDICATION:   TRAUMA      COMPARISON: None      TECHNIQUE:  Axial CT images were obtained through the facial bones with additional sagittal and coronal reconstructions       Radiation dose length product (DLP) for this visit:  760 5 mGy-cm   This examination, like all CT scans performed in the [facility], was performed utilizing techniques to minimize radiation dose exposure, including the use of iterative   reconstruction and automated exposure control        IMAGE QUALITY:  Diagnostic      FINDINGS:      FACIAL BONES:   No facial bone fracture identified    Normal alignment of the temporomandibular joints  No lytic or blastic lesion      ORBITS: Postsurgical changes of bilateral globes  Orbital globes, optic nerves, and extraocular muscles appear symmetric and normal  There is no evidence of retrobulbar mass, abscess, or hematoma      SINUSES:  No significant mucosal thickening  Left nasal airway in place      SOFT TISSUES:  Normal      IMPRESSION:     No acute fracture of the maxillofacial structures          0010 CT CHEST, ABDOMEN AND PELVIS WITH IV CONTRAST     FINDINGS:     CHEST     LUNGS:  Opacity in the left lower lobe may be due to pneumonia versus aspiration  Endotracheal tube terminates above the nahomi      PLEURA:  Unremarkable      HEART/GREAT VESSELS:  Unremarkable for patient's age      MEDIASTINUM AND DAVID:  Unremarkable      CHEST WALL AND LOWER NECK:   Unremarkable      ABDOMEN     LIVER/BILIARY TREE:  Hepatic steatosis      GALLBLADDER:  No calcified gallstones  No pericholecystic inflammatory change      SPLEEN:  Unremarkable      PANCREAS:  Unremarkable      ADRENAL GLANDS:  Unremarkable      KIDNEYS/URETERS:  One or more simple renal cyst(s) is noted  Otherwise unremarkable kidneys  No hydronephrosis      STOMACH AND BOWEL:  No bowel obstruction  Diverticulosis without evidence of diverticulitis  Endotracheal tube terminates within the stomach      APPENDIX:  A normal appendix was visualized      ABDOMINOPELVIC CAVITY:  No ascites  No pneumoperitoneum  No lymphadenopathy      VESSELS:  Right femoral venous catheter in place      PELVIS     REPRODUCTIVE ORGANS:  Enlarged prostate      URINARY BLADDER:  The urinary bladder is thickened however under distended      ABDOMINAL WALL/INGUINAL REGIONS:  Unremarkable      OSSEOUS STRUCTURES:  No acute fracture or destructive osseous lesion      IMPRESSION:     1  No acute traumatic injury identified within the chest, abdomen, and pelvis  2   Opacity in the left lower lobe may be due to pneumonia versus aspiration    3   Endotracheal tube terminates above the nahomi  Enteric tube terminates within the stomach  4   Diverticulosis without evidence of diverticulitis  5   The urinary bladder is thickened which may be due to underdistention, chronic bladder outlet obstruction due to enlarged prostate, or cystitis, correlate with urinalysis  6   Hepatic steatosis  0011 Trauma scans reviewed  No concerning findings    Cervical spine cleared based on the lack of concerning findings radiographically      0011 Discussed withdrawn with respiratory therapy the day  Will decrease rate to 14 and FiO2 down to 60%      0012 Will transfer patient to nearest ICU  PACs transfer request submitted       0028 Dw Isra Jamison in [facility]      0034 Patient remains clinically stable    Third L of IV fluids ordered for sepsis and renal failure  Patient stoma needing any sedation  No urine seen in the santiago      0051 Dw Astrid Spencer, Pt's cousin, who is making decisions for the pt  Reviewed the stable ED course and plan to transfer to [facility] where a ICU bed is available  She gives consent to transfer       [phone] D/W ICU, Dr Tobe Kayser   Reviewed the case   Accepts the pt to his service       [facility]5 [facility] to be here in 10 minutes for transport      0214 Dr Tobe Kayser requesting repeat BMP                                              MDM    Disposition  Final diagnoses:   AMS (altered mental status)   RED (acute kidney injury) ([address] )   Hypernatremia   Hypokalemia   Hypomagnesemia   Elevated troponin   Hypotension   Respiratory failure ([address] )   Rhabdomyolysis     Time reflects when diagnosis was documented in both MDM as applicable and the Disposition within this note     Time User Action Codes Description Comment    4/12/2021 11:02 PM Bonnita Laser [R41 82] AMS (altered mental status)     4/12/2021 11:02 PM Micha Aponte Add [N17 9] RED (acute kidney injury) ([address] )     4/12/2021 11:02 PM Bonnita Laser [E87 0] Hypernatremia     4/12/2021 11:02 PM Bonnita Laser [E87 6] Hypokalemia     4/12/2021 11:02 PM Evangelista Boot [E83 42] Hypomagnesemia     4/12/2021 11:03 PM Musa Hurry Add [R77 8] Elevated troponin     4/12/2021 11:03 PM Musa Hurry Add [I95 9] Hypotension     4/12/2021 11:03 PM Musa Hurry Add [J96 90] Respiratory failure ([address] )     4/12/2021 11:22 PM Musa Hurry Add [M62 82] Rhabdomyolysis       ED Disposition     ED Disposition Condition Date/Time Comment    Transfer to Another Facility-In Network  Tue Apr 13, 2021  1:40 AM Rena Perez should be transferred out to [facility] Sharonda Sal MD Documentation      Most Recent Value   Patient Condition  The patient has been stabilized such that within reasonable medical probability, no material deterioration of the patient condition or the condition of the unborn child(delvin) is likely to result from the transfer   Reason for Transfer  Level of Care needed not available at this facility   Benefits of Transfer  Specialized equipment and/or services available at the receiving facility (Include comment)________________________   Risks of Transfer  Potential for delay in receiving treatment, Potential deterioration of medical condition, Loss of IV, Increased discomfort during transfer, Possible worsening of condition or death during transfer   Accepting Physician  Dr Madhu Bauman Name, Yara Mooney by [facility] and Unit #)  [facility]   Sending MD Dr Skylar Cohn   Provider Certification  General risk, such as traffic hazards, adverse weather conditions, rough terrain or turbulence, possible failure of equipment (including vehicle or aircraft), or consequences of actions of persons outside the control of the transport personnel, Unanticipated needs of medical equipment and personnel during transport, Risk of worsening condition, The possibility of a transport vehicle being unavailable      RN Documentation      Most Recent Value   Accepting Facility Name, [address] [place] [address]   Bed Assignment     Report Given to  Nathalie Martin RN   [facility] and Unit [facility]  [facility]   Level of Care  Advanced life support      Follow-up Information    None       Date, Time and Cause of Death    Date of Death: 4/13/21  Time of Death:  1:38 PM  Preliminary Cause of Death: Septic shock ([facility] [address] )  Entered by: Josie Baum[KS1 1]     Attribution     KS1 1 Adonay Fowler PA-C 04/13/21 15:47        Discharge Medication List as of 4/13/2021  2:44 AM      CONTINUE these medications which have NOT CHANGED    Details   Blood Pressure Monitor KIT Use daily, Starting Wed 3/10/2021, Normal      ONETOUCH DELICA LANCETS FINE MISC by Does not apply route daily, Starting Fri 10/19/2018, Normal      amLODIPine (NORVASC) 10 mg tablet take 1 tablet by mouth once daily, Normal      aspirin 81 MG tablet Take 81 mg by mouth daily, Historical Med      atenolol (TENORMIN) 50 mg tablet Take 1 tablet (50 mg total) by mouth daily, Starting Wed 2/3/2021, Normal      atropine (ISOPTO ATROPINE) 1 % ophthalmic solution instill 1 drop into right eye twice a day AFTER SURGERY, Historical Med      benazepril (LOTENSIN) 40 MG tablet Take 1 tablet (40 mg total) by mouth daily, Starting Thu 4/1/2021, Normal      COMBIGAN 0 2-0 5 % 1 drop every 12 (twelve) hours 1 drop each eye twice daily, Starting Mon 8/27/2018, Historical Med      diclofenac sodium (VOLTAREN) 1 % Apply 2 g topically 2 (two) times a day as needed (pain), Starting Thu 3/26/2020, Normal      glucose blood (ONE TOUCH ULTRA TEST) test strip 1 each by Other route daily Use as instructed, Starting Tue 3/17/2020, Normal      hydrochlorothiazide (HYDRODIURIL) 25 mg tablet take 1 tablet by mouth once daily, Normal      Januvia 50 MG tablet take 1 tablet by mouth once daily, Normal      magnesium Oxide (MAG-OX) 400 mg TABS Take 400 mg by mouth daily, Starting Fri 1/8/2021, Historical Med      metFORMIN (GLUCOPHAGE) 1000 MG tablet take 1 tablet by mouth twice a day WITH A MEAL, Normal      prednisoLONE acetate (PRED FORTE) 1 % ophthalmic suspension instill 1 drop into right eye EVERY 1-2 HOURS AFTER SURGERY, Historical Med      RHOPRESSA 0 02 % SOLN daily at bedtime 1 Drop each eye at bedtime, Starting Mon 7/23/2018, Historical Med      simvastatin (ZOCOR) 20 mg tablet take 1 tablet by mouth at bedtime, Normal      Vyzulta 0 024 % SOLN instill 1 drop into both eyes every NIGHT, Historical Med           No discharge procedures on file      PDMP Review     None          ED Provider  Electronically Signed by           Yee Gerard MD  04/14/21 2665

## 2021-04-13 NOTE — EMTALA/ACUTE CARE TRANSFER
[facility]  [address] [id number] [phone]  Dept: [phone]      EMTALA TRANSFER CONSENT    NAME Tesfaye Brunner                                         1950                              MRN [id number]    I have been informed of my rights regarding examination, treatment, and transfer   by Dr Ange Augustine MD    Benefits: Specialized equipment and/or services available at the receiving facility (Include comment)________________________    Risks: Potential for delay in receiving treatment, Potential deterioration of medical condition, Loss of IV, Increased discomfort during transfer, Possible worsening of condition or death during transfer      Consent for Transfer:  I acknowledge that my medical condition has been evaluated and explained to me by the emergency department physician or other qualified medical person and/or my attending physician, who has recommended that I be transferred to the service of  Accepting Physician: Dr Upton Batch  at    The above potential benefits of such transfer, the potential risks associated with such transfer, and the probable risks of not being transferred have been explained to me, and I fully understand them  The doctor has explained that, in my case, the benefits of transfer outweigh the risks  I agree to be transferred  I authorize the performance of emergency medical procedures and treatments upon me in both transit and upon arrival at the receiving facility  Additionally, I authorize the release of any and all medical records to the receiving facility and request they be transported with me, if possible  I understand that the safest mode of transportation during a medical emergency is an ambulance and that the Hospital advocates the use of this mode of transport   Risks of traveling to the receiving facility by car, including absence of medical control, life sustaining equipment, such as oxygen, and medical personnel has been explained to me and I fully understand them  (BOBBI CORRECT BOX BELOW)  [  ]  I consent to the stated transfer and to be transported by ambulance/helicopter  [  ]  I consent to the stated transfer, but refuse transportation by ambulance and accept full responsibility for my transportation by car  I understand the risks of non-ambulance transfers and I exonerate the Hospital and its staff from any deterioration in my condition that results from this refusal     X___________________________________________    DATE  21  TIME________  Signature of patient or legally responsible individual signing on patient behalf           RELATIONSHIP TO PATIENT_________________________          Provider Certification    NAME Magi Garibay                                         1950                              MRN [id number]    A medical screening exam was performed on the above named patient  Based on the examination:    Condition Necessitating Transfer The primary encounter diagnosis was AMS (altered mental status)  Diagnoses of RED (acute kidney injury) ([address] ), Hypernatremia, Hypokalemia, Hypomagnesemia, Elevated troponin, Hypotension, Respiratory failure ([address] ), and Rhabdomyolysis were also pertinent to this visit      Patient Condition: The patient has been stabilized such that within reasonable medical probability, no material deterioration of the patient condition or the condition of the unborn child(delvin) is likely to result from the transfer    Reason for Transfer: Level of Care needed not available at this facility    Transfer Requirements: Facility     · Space available and qualified personnel available for treatment as acknowledged by    · Agreed to accept transfer and to provide appropriate medical treatment as acknowledged by       Dr Diego Mackay   · Appropriate medical records of the examination and treatment of the patient are provided at the time of transfer STAFF INITIAL WHEN COMPLETED _______  · Transfer will be performed by qualified personnel from    and appropriate transfer equipment as required, including the use of necessary and appropriate life support measures  Provider Certification: I have examined the patient and explained the following risks and benefits of being transferred/refusing transfer to the patient/family:  General risk, such as traffic hazards, adverse weather conditions, rough terrain or turbulence, possible failure of equipment (including vehicle or aircraft), or consequences of actions of persons outside the control of the transport personnel, Unanticipated needs of medical equipment and personnel during transport, Risk of worsening condition, The possibility of a transport vehicle being unavailable      Based on these reasonable risks and benefits to the patient and/or the unborn child(delvin), and based upon the information available at the time of the patients examination, I certify that the medical benefits reasonably to be expected from the provision of appropriate medical treatments at another medical facility outweigh the increasing risks, if any, to the individuals medical condition, and in the case of labor to the unborn child, from effecting the transfer      X____________________________________________ DATE 04/13/21        TIME_______      ORIGINAL - SEND TO MEDICAL RECORDS   COPY - SEND WITH PATIENT DURING TRANSFER

## 2021-04-13 NOTE — ASSESSMENT & PLAN NOTE
· Gastroccult positive- start on Protonix 40 gm IV BID  · Consider consult to Gastroenterology pending [address] discussion  · Trend H/H  · Transitioned to [address]

## 2021-04-13 NOTE — ED PROCEDURE NOTE
PROCEDURE  Central Line    Date/Time: 4/12/2021 10:40 PM  Performed by: Ange Augustine MD  Authorized by: Ange Augustine MD     Patient location:  ED  Consent:     Consent obtained:  Emergent situation  Universal protocol:     Procedure explained and questions answered to patient or proxy's satisfaction: yes      Patient identity confirmed:  Verbally with patient  Pre-procedure details:     Hand hygiene: Hand hygiene performed prior to insertion      Sterile barrier technique: All elements of maximal sterile technique followed      Skin preparation:  2% chlorhexidine    Skin preparation agent: Skin preparation agent completely dried prior to procedure    Indications:     Central line indications: medications requiring central line      Site selection rationale:  Ease of placement, critically ill  Procedure details:     Location:  Right femoral    Vessel type: vein      Laterality:  Right    Approach: percutaneous technique used      Catheter type:  Triple lumen    Catheter size:  7 Fr    Landmarks identified: yes      Number of attempts:  1    Successful placement: yes    Post-procedure details:     Post-procedure:  Dressing applied    Assessment:  Blood return through all ports    Patient tolerance of procedure:   Tolerated well, no immediate complications         Ange Augustine MD  04/12/21 5709

## 2021-04-13 NOTE — RESPIRATORY THERAPY NOTE
RT Ventilator Management Note  Maycol Patino 79 y o  male MRN: [id number]  Unit/Bed#: -01 Encounter: [id number]      Daily Screen       4/13/2021  0514             Patient safety screen outcome[de-identified]  Failed            Physical Exam:   Assessment Type: Assess only  General Appearance: Sedated  Respiratory Pattern: Assisted  Chest Assessment: Chest expansion symmetrical  Bilateral Breath Sounds: Clear  Suction: ET Tube  O2 Device: Vent  Subjective Data: Assumed care of patient after report from air [facility] transfer team   Patient arrived intubated and on a vent/  Discussed patient breifly via tiger text with therapist from [address] [place] [address]  Therapsit reported copious secretions  Flight crew reported patient was tolerating origioanl vent settings but pulsox began to decrease while in lfight  Crew switched patient to SIMV mode for the purpose of oxygenation  Upon patient's connection to hospital vent patient was breath stacking and overbreathing vent  Patient was placed in P(cmv) mode for lung protection until he could be propely sedated  An ABG was drawn  Patient was then placed in APV(cmv) mode once his sedation was set  Patient tolerated vent well  Joslyn therapsit reported copious secretions but recent suctioning produced scant amounts

## 2021-04-13 NOTE — ASSESSMENT & PLAN NOTE
· Seen by Surgery and will need debridement and extensive wound care  · Likely source of sepsis  · Multiple stage 2 pressure injuries on shoulders and feet- POA

## 2021-04-13 NOTE — ASSESSMENT & PLAN NOTE
· > 2 0 on initial labs  · Peaked at 3 4 despite fluid resuscitation  · Poor UOP  · Considering CRRT     · Nephrology consulted- discontinued due to transitioning to [address]

## 2021-04-13 NOTE — NURSING NOTE
[facility] [employer] [employer] [facility] who states [facility] will not be sending anyone out and that patient is not a candidate for donation

## 2021-04-14 LAB
ATRIAL RATE: 103 BPM
ATRIAL RATE: 111 BPM
P AXIS: 70 DEGREES
P AXIS: 71 DEGREES
PR INTERVAL: 126 MS
PR INTERVAL: 128 MS
QRS AXIS: 69 DEGREES
QRS AXIS: 73 DEGREES
QRSD INTERVAL: 82 MS
QRSD INTERVAL: 88 MS
QT INTERVAL: 352 MS
QT INTERVAL: 376 MS
QTC INTERVAL: 461 MS
QTC INTERVAL: 511 MS
T WAVE AXIS: -30 DEGREES
T WAVE AXIS: 32 DEGREES
TPA PPP QL CHRO: 54 % OF NORMAL (ref 77–138)
VENTRICULAR RATE: 103 BPM
VENTRICULAR RATE: 111 BPM
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
## 2021-04-14 NOTE — ED PROCEDURE NOTE
PROCEDURE  CriticalCare Time  Performed by: Mitchel Francis MD  Authorized by: Mitchel Francis MD     Critical care provider statement:     Critical care time (minutes):  95    Critical care start time:  4/12/2021 9:25 PM    Critical care end time:  4/13/2021 2:30 AM    Critical care time was exclusive of:  Separately billable procedures and treating other patients    Critical care was necessary to treat or prevent imminent or life-threatening deterioration of the following conditions:  Circulatory failure, respiratory failure, sepsis, shock, trauma, dehydration and CNS failure or compromise    Critical care was time spent personally by me on the following activities:  Ventilator management, review of old charts, re-evaluation of patient's condition, ordering and review of radiographic studies, ordering and review of laboratory studies, ordering and performing treatments and interventions, obtaining history from patient or surrogate, development of treatment plan with patient or surrogate, discussions with consultants, discussions with primary provider, evaluation of patient's response to treatment and examination of patient         Mitchel Francis MD  04/14/21 4101
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
## 2021-04-15 LAB
MRSA NOSE QL CULT: ABNORMAL
MRSA NOSE QL CULT: ABNORMAL

## 2021-04-18 LAB
BACTERIA BLD CULT: NORMAL
BACTERIA BLD CULT: NORMAL